# Patient Record
Sex: MALE | Race: BLACK OR AFRICAN AMERICAN | Employment: FULL TIME | URBAN - METROPOLITAN AREA
[De-identification: names, ages, dates, MRNs, and addresses within clinical notes are randomized per-mention and may not be internally consistent; named-entity substitution may affect disease eponyms.]

---

## 2017-03-19 ENCOUNTER — HOSPITAL ENCOUNTER (OUTPATIENT)
Age: 57
Discharge: HOME | End: 2017-03-19
Payer: COMMERCIAL

## 2017-03-19 DIAGNOSIS — E03.9 HYPOTHYROIDISM, UNSPECIFIED TYPE: ICD-10-CM

## 2017-03-19 DIAGNOSIS — E03.9 HYPOTHYROIDISM: ICD-10-CM

## 2017-03-19 LAB
ANION GAP SERPL CALCULATED.3IONS-SCNC: 10 MEQ/L (ref 7–13)
BUN BLDV-MCNC: 14 MG/DL (ref 6–20)
CALCIUM SERPL-MCNC: 9.6 MG/DL (ref 8.6–10.2)
CHLORIDE BLD-SCNC: 98 MEQ/L (ref 98–107)
CO2: 30 MEQ/L (ref 22–29)
CREAT SERPL-MCNC: 0.93 MG/DL (ref 0.7–1.2)
CREATININE URINE: 28.2 MG/DL
GFR AFRICAN AMERICAN: >60
GFR NON-AFRICAN AMERICAN: >60
GLUCOSE BLD-MCNC: 113 MG/DL (ref 74–109)
HBA1C MFR BLD: 6.2 % (ref 4.8–5.9)
MICROALBUMIN UR-MCNC: <1.2 MG/DL
MICROALBUMIN/CREAT UR-RTO: NORMAL MG/G (ref 0–30)
POTASSIUM SERPL-SCNC: 3.5 MEQ/L (ref 3.5–5.1)
SODIUM BLD-SCNC: 138 MEQ/L (ref 132–144)
T4 FREE: 1.22 NG/DL (ref 0.93–1.7)
TSH SERPL DL<=0.05 MIU/L-ACNC: 0.99 UIU/ML (ref 0.27–4.2)

## 2017-03-23 ENCOUNTER — OFFICE VISIT (OUTPATIENT)
Dept: SURGERY | Age: 57
End: 2017-03-23

## 2017-03-23 VITALS
SYSTOLIC BLOOD PRESSURE: 146 MMHG | DIASTOLIC BLOOD PRESSURE: 83 MMHG | BODY MASS INDEX: 35.62 KG/M2 | HEIGHT: 72 IN | WEIGHT: 263 LBS | HEART RATE: 62 BPM

## 2017-03-23 DIAGNOSIS — E03.9 HYPOTHYROIDISM, UNSPECIFIED TYPE: ICD-10-CM

## 2017-03-23 DIAGNOSIS — I10 ESSENTIAL HYPERTENSION: ICD-10-CM

## 2017-03-23 DIAGNOSIS — N62 GYNECOMASTIA: ICD-10-CM

## 2017-03-23 LAB — GLUCOSE BLD-MCNC: 160 MG/DL

## 2017-03-23 PROCEDURE — 82962 GLUCOSE BLOOD TEST: CPT | Performed by: INTERNAL MEDICINE

## 2017-03-23 PROCEDURE — 99213 OFFICE O/P EST LOW 20 MIN: CPT | Performed by: INTERNAL MEDICINE

## 2017-03-23 RX ORDER — EPLERENONE 50 MG/1
50 TABLET, FILM COATED ORAL 2 TIMES DAILY
Qty: 180 TABLET | Refills: 3 | Status: SHIPPED | OUTPATIENT
Start: 2017-03-23 | End: 2018-02-23 | Stop reason: SDUPTHER

## 2017-03-23 RX ORDER — LEVOTHYROXINE SODIUM 0.12 MG/1
125 TABLET ORAL DAILY
Qty: 90 TABLET | Refills: 3 | Status: SHIPPED | OUTPATIENT
Start: 2017-03-23 | End: 2018-02-23 | Stop reason: SDUPTHER

## 2017-03-23 RX ORDER — TAMOXIFEN CITRATE 10 MG/1
TABLET ORAL
Qty: 180 TABLET | Refills: 3 | Status: SHIPPED | OUTPATIENT
Start: 2017-03-23 | End: 2018-02-23 | Stop reason: SDUPTHER

## 2017-05-10 ENCOUNTER — EMPLOYEE WELLNESS (OUTPATIENT)
Dept: OTHER | Age: 57
End: 2017-05-10

## 2017-05-10 LAB
CHOLESTEROL, TOTAL: 127 MG/DL (ref 0–199)
GLUCOSE BLD-MCNC: 90 MG/DL (ref 74–109)
HDLC SERPL-MCNC: 28 MG/DL (ref 40–59)
LDL CHOLESTEROL CALCULATED: 70 MG/DL (ref 0–129)
TRIGL SERPL-MCNC: 145 MG/DL (ref 0–200)

## 2017-06-18 LAB — T4 FREE: 1.08 NG/DL (ref 0.93–1.7)

## 2017-06-23 ENCOUNTER — OFFICE VISIT (OUTPATIENT)
Dept: SURGERY | Age: 57
End: 2017-06-23

## 2017-06-23 VITALS
WEIGHT: 269 LBS | HEIGHT: 72 IN | SYSTOLIC BLOOD PRESSURE: 136 MMHG | BODY MASS INDEX: 36.44 KG/M2 | HEART RATE: 62 BPM | DIASTOLIC BLOOD PRESSURE: 86 MMHG

## 2017-06-23 DIAGNOSIS — E03.9 HYPOTHYROIDISM, UNSPECIFIED TYPE: ICD-10-CM

## 2017-06-23 LAB — GLUCOSE BLD-MCNC: 137 MG/DL

## 2017-06-23 PROCEDURE — 99213 OFFICE O/P EST LOW 20 MIN: CPT | Performed by: INTERNAL MEDICINE

## 2017-06-23 PROCEDURE — 82962 GLUCOSE BLOOD TEST: CPT | Performed by: INTERNAL MEDICINE

## 2017-10-23 DIAGNOSIS — E03.9 HYPOTHYROIDISM, UNSPECIFIED TYPE: ICD-10-CM

## 2017-10-23 LAB
ANION GAP SERPL CALCULATED.3IONS-SCNC: 13 MEQ/L (ref 7–13)
BUN BLDV-MCNC: 20 MG/DL (ref 6–20)
CALCIUM SERPL-MCNC: 9.4 MG/DL (ref 8.6–10.2)
CHLORIDE BLD-SCNC: 102 MEQ/L (ref 98–107)
CO2: 30 MEQ/L (ref 22–29)
CREAT SERPL-MCNC: 1.03 MG/DL (ref 0.7–1.2)
GFR AFRICAN AMERICAN: >60
GFR NON-AFRICAN AMERICAN: >60
GLUCOSE BLD-MCNC: 107 MG/DL (ref 74–109)
HBA1C MFR BLD: 6.2 % (ref 4.8–5.9)
POTASSIUM SERPL-SCNC: 3.6 MEQ/L (ref 3.5–5.1)
SODIUM BLD-SCNC: 145 MEQ/L (ref 132–144)
T4 FREE: 1.24 NG/DL (ref 0.93–1.7)
TSH SERPL DL<=0.05 MIU/L-ACNC: 1.37 UIU/ML (ref 0.27–4.2)

## 2017-10-27 ENCOUNTER — OFFICE VISIT (OUTPATIENT)
Dept: SURGERY | Age: 57
End: 2017-10-27

## 2017-10-27 VITALS
HEART RATE: 60 BPM | BODY MASS INDEX: 35.35 KG/M2 | DIASTOLIC BLOOD PRESSURE: 98 MMHG | WEIGHT: 261 LBS | HEIGHT: 72 IN | SYSTOLIC BLOOD PRESSURE: 167 MMHG

## 2017-10-27 DIAGNOSIS — E03.9 HYPOTHYROIDISM, UNSPECIFIED TYPE: ICD-10-CM

## 2017-10-27 DIAGNOSIS — E29.1 HYPOGONADISM MALE: ICD-10-CM

## 2017-10-27 DIAGNOSIS — I10 ESSENTIAL HYPERTENSION: ICD-10-CM

## 2017-10-27 LAB — GLUCOSE BLD-MCNC: 175 MG/DL

## 2017-10-27 PROCEDURE — 99213 OFFICE O/P EST LOW 20 MIN: CPT | Performed by: INTERNAL MEDICINE

## 2017-10-27 PROCEDURE — 82962 GLUCOSE BLOOD TEST: CPT | Performed by: INTERNAL MEDICINE

## 2017-10-27 RX ORDER — AMLODIPINE BESYLATE 5 MG/1
5 TABLET ORAL DAILY
Qty: 90 TABLET | Refills: 3 | Status: SHIPPED | OUTPATIENT
Start: 2017-10-27 | End: 2019-01-14 | Stop reason: SDUPTHER

## 2017-10-27 RX ORDER — AMLODIPINE BESYLATE 5 MG/1
5 TABLET ORAL DAILY
Qty: 30 TABLET | Refills: 3 | Status: SHIPPED | OUTPATIENT
Start: 2017-10-27 | End: 2017-10-27 | Stop reason: SDUPTHER

## 2018-02-06 DIAGNOSIS — E29.1 HYPOGONADISM MALE: ICD-10-CM

## 2018-02-06 DIAGNOSIS — E03.9 HYPOTHYROIDISM, UNSPECIFIED TYPE: ICD-10-CM

## 2018-02-06 LAB
ANION GAP SERPL CALCULATED.3IONS-SCNC: 15 MEQ/L (ref 7–13)
BUN BLDV-MCNC: 17 MG/DL (ref 6–20)
CALCIUM SERPL-MCNC: 9.3 MG/DL (ref 8.6–10.2)
CHLORIDE BLD-SCNC: 100 MEQ/L (ref 98–107)
CO2: 28 MEQ/L (ref 22–29)
CREAT SERPL-MCNC: 0.98 MG/DL (ref 0.7–1.2)
GFR AFRICAN AMERICAN: >60
GFR NON-AFRICAN AMERICAN: >60
GLUCOSE BLD-MCNC: 155 MG/DL (ref 74–109)
HBA1C MFR BLD: 6.8 % (ref 4.8–5.9)
POTASSIUM SERPL-SCNC: 3.7 MEQ/L (ref 3.5–5.1)
SODIUM BLD-SCNC: 143 MEQ/L (ref 132–144)
T4 FREE: 1.25 NG/DL (ref 0.93–1.7)
TSH SERPL DL<=0.05 MIU/L-ACNC: 1.51 UIU/ML (ref 0.27–4.2)

## 2018-02-08 LAB
SEX HORMONE BINDING GLOBULIN: 101 NMOL/L (ref 11–80)
TESTOSTERONE FREE PERCENT: 1 % (ref 1.6–2.9)
TESTOSTERONE FREE, CALC: 106 PG/ML (ref 47–244)
TESTOSTERONE TOTAL-MALE: 1082 NG/DL (ref 300–890)

## 2018-02-23 ENCOUNTER — OFFICE VISIT (OUTPATIENT)
Dept: ENDOCRINOLOGY | Age: 58
End: 2018-02-23
Payer: COMMERCIAL

## 2018-02-23 VITALS
SYSTOLIC BLOOD PRESSURE: 152 MMHG | HEART RATE: 64 BPM | HEIGHT: 72 IN | BODY MASS INDEX: 36.3 KG/M2 | DIASTOLIC BLOOD PRESSURE: 90 MMHG | WEIGHT: 268 LBS

## 2018-02-23 DIAGNOSIS — N62 GYNECOMASTIA: ICD-10-CM

## 2018-02-23 DIAGNOSIS — E11.8 TYPE 2 DIABETES MELLITUS WITH COMPLICATION, UNSPECIFIED LONG TERM INSULIN USE STATUS: ICD-10-CM

## 2018-02-23 DIAGNOSIS — E03.9 HYPOTHYROIDISM, UNSPECIFIED TYPE: ICD-10-CM

## 2018-02-23 LAB — GLUCOSE BLD-MCNC: 172 MG/DL

## 2018-02-23 PROCEDURE — 99213 OFFICE O/P EST LOW 20 MIN: CPT | Performed by: INTERNAL MEDICINE

## 2018-02-23 PROCEDURE — 82962 GLUCOSE BLOOD TEST: CPT | Performed by: INTERNAL MEDICINE

## 2018-02-23 RX ORDER — LANCETS 26 GAUGE
EACH MISCELLANEOUS
Qty: 100 EACH | Refills: 3 | Status: SHIPPED | OUTPATIENT
Start: 2018-02-23 | End: 2021-09-09 | Stop reason: SDUPTHER

## 2018-02-23 RX ORDER — TAMOXIFEN CITRATE 10 MG/1
TABLET ORAL
Qty: 180 TABLET | Refills: 3 | Status: SHIPPED | OUTPATIENT
Start: 2018-02-23 | End: 2019-01-14 | Stop reason: SDUPTHER

## 2018-02-23 RX ORDER — EPLERENONE 50 MG/1
50 TABLET, FILM COATED ORAL 2 TIMES DAILY
Qty: 180 TABLET | Refills: 3 | Status: SHIPPED | OUTPATIENT
Start: 2018-02-23 | End: 2019-01-14 | Stop reason: SDUPTHER

## 2018-02-23 RX ORDER — LEVOTHYROXINE SODIUM 0.12 MG/1
125 TABLET ORAL DAILY
Qty: 90 TABLET | Refills: 3 | Status: SHIPPED | OUTPATIENT
Start: 2018-02-23 | End: 2019-01-14 | Stop reason: SDUPTHER

## 2018-03-03 NOTE — PROGRESS NOTES
Creatinine Latest Ref Range: 0.70 - 1.20 mg/dL 0.98    Anion Gap Latest Ref Range: 7 - 13 mEq/L 15 (H)    GFR Non- Latest Ref Range: >60  >60.0    GFR African American Latest Ref Range: >60  >60.0    Glucose Latest Ref Range: 74 - 109 mg/dL 155 (H)    Calcium Latest Ref Range: 8.6 - 10.2 mg/dL 9.3    Sex Hormone Binding Latest Ref Range: 11 - 80 nmol/L  101 (H)   Testosterone Free, Calc Latest Ref Range: 47 - 244 pg/mL  106   Total Testosterone Latest Ref Range: 300 - 890 ng/dL  1082 (H)   Testosterone % Free Latest Ref Range: 1.6 - 2.9 %  1.0 (L)   TSH Latest Ref Range: 0.270 - 4.200 uIU/mL 1.510    T4 Free Latest Ref Range: 0.93 - 1.70 ng/dL 1.25        Assessment:       1. Uncontrolled diabetes mellitus type 2 without complications, unspecified long term insulin use status (HCC)  POCT Glucose    SITagliptin (JANUVIA) 100 MG tablet    Basic Metabolic Panel    Testosterone Free And Total Male    T4, Free    TSH without Reflex    Hemoglobin A1C   2. Gynecomastia  eplerenone (INSPRA) 50 MG tablet    SAFETY LANCETS MISC    glucose blood VI test strips (FREESTYLE INSULINX TEST) strip   3. Type 2 diabetes mellitus with complication, unspecified long term insulin use status (HCC)  SAFETY LANCETS MISC    glucose blood VI test strips (FREESTYLE INSULINX TEST) strip   4.  Hypothyroidism  SAFETY LANCETS MISC    glucose blood VI test strips (FREESTYLE INSULINX TEST) strip         Plan:      Orders Placed This Encounter   Procedures    Basic Metabolic Panel     Standing Status:   Future     Standing Expiration Date:   2/23/2019    Testosterone Free And Total Male     Standing Status:   Future     Standing Expiration Date:   2/23/2019    T4, Free     Standing Status:   Future     Standing Expiration Date:   2/23/2019    TSH without Reflex     Standing Status:   Future     Standing Expiration Date:   2/23/2019    Hemoglobin A1C     Standing Status:   Future     Standing Expiration Date:   2/23/2019   Naeem Lara POCT Glucose       Orders Placed This Encounter   Medications    metFORMIN (GLUCOPHAGE) 500 MG tablet     Sig: TAKE 1 TABLET TWICE A DAY WITH MEALS     Dispense:  180 tablet     Refill:  3    tamoxifen (NOLVADEX) 10 MG tablet     Sig: Take 1 tablet by mouth  twice a day     Dispense:  180 tablet     Refill:  3    levothyroxine (SYNTHROID) 125 MCG tablet     Sig: Take 1 tablet by mouth daily     Dispense:  90 tablet     Refill:  03    eplerenone (INSPRA) 50 MG tablet     Sig: Take 1 tablet by mouth 2 times daily     Dispense:  180 tablet     Refill:  03    SITagliptin (JANUVIA) 100 MG tablet     Sig: Take 1 tablet by mouth daily     Dispense:  90 tablet     Refill:  3    SAFETY LANCETS MISC     Sig: Safety pro lancets, use one daily     Dispense:  100 each     Refill:  3    glucose blood VI test strips (FREESTYLE INSULINX TEST) strip     Sig: Test once daily     Dispense:  100 strip     Refill:  3

## 2018-03-05 ENCOUNTER — TELEPHONE (OUTPATIENT)
Dept: INTERNAL MEDICINE CLINIC | Age: 58
End: 2018-03-05

## 2018-03-05 RX ORDER — BLOOD-GLUCOSE METER
EACH MISCELLANEOUS
Qty: 1 KIT | Refills: 0 | Status: SHIPPED | OUTPATIENT
Start: 2018-03-05 | End: 2018-03-14 | Stop reason: SDUPTHER

## 2018-03-14 RX ORDER — BLOOD-GLUCOSE METER
EACH MISCELLANEOUS
Qty: 1 KIT | Refills: 0 | Status: SHIPPED | OUTPATIENT
Start: 2018-03-14 | End: 2022-04-21 | Stop reason: SDUPTHER

## 2018-03-20 VITALS — BODY MASS INDEX: 35.53 KG/M2 | WEIGHT: 262 LBS

## 2018-04-12 LAB
CHOLESTEROL, TOTAL: 120 MG/DL (ref 0–199)
HDLC SERPL-MCNC: 31 MG/DL (ref 40–59)
LDL CHOLESTEROL CALCULATED: 60 MG/DL (ref 0–129)
TRIGL SERPL-MCNC: 145 MG/DL (ref 0–200)

## 2018-04-19 ENCOUNTER — OFFICE VISIT (OUTPATIENT)
Dept: FAMILY MEDICINE CLINIC | Age: 58
End: 2018-04-19
Payer: COMMERCIAL

## 2018-04-19 VITALS
TEMPERATURE: 97.9 F | DIASTOLIC BLOOD PRESSURE: 80 MMHG | HEART RATE: 58 BPM | SYSTOLIC BLOOD PRESSURE: 124 MMHG | BODY MASS INDEX: 36.24 KG/M2 | RESPIRATION RATE: 18 BRPM | WEIGHT: 267.6 LBS | HEIGHT: 72 IN | OXYGEN SATURATION: 99 %

## 2018-04-19 DIAGNOSIS — M76.62 TENDONITIS, ACHILLES, LEFT: Primary | ICD-10-CM

## 2018-04-19 PROCEDURE — 99213 OFFICE O/P EST LOW 20 MIN: CPT | Performed by: NURSE PRACTITIONER

## 2018-04-19 RX ORDER — METHYLPREDNISOLONE 4 MG/1
TABLET ORAL
Qty: 1 KIT | Refills: 0 | Status: SHIPPED | OUTPATIENT
Start: 2018-04-19 | End: 2018-05-25 | Stop reason: ALTCHOICE

## 2018-04-26 ENCOUNTER — EMPLOYEE WELLNESS (OUTPATIENT)
Dept: INTERNAL MEDICINE CLINIC | Age: 58
End: 2018-04-26

## 2018-04-26 LAB
CHOLESTEROL, TOTAL: 124 MG/DL (ref 0–199)
GLUCOSE BLD-MCNC: 103 MG/DL (ref 74–109)
HDLC SERPL-MCNC: 33 MG/DL (ref 40–59)
LDL CHOLESTEROL CALCULATED: 60 MG/DL (ref 0–129)
TRIGL SERPL-MCNC: 154 MG/DL (ref 0–200)

## 2018-05-02 VITALS — WEIGHT: 257 LBS | BODY MASS INDEX: 34.86 KG/M2

## 2018-05-08 LAB
ANION GAP SERPL CALCULATED.3IONS-SCNC: 16 MEQ/L (ref 7–13)
BUN BLDV-MCNC: 14 MG/DL (ref 6–20)
CALCIUM SERPL-MCNC: 9.3 MG/DL (ref 8.6–10.2)
CHLORIDE BLD-SCNC: 99 MEQ/L (ref 98–107)
CO2: 30 MEQ/L (ref 22–29)
CREAT SERPL-MCNC: 1 MG/DL (ref 0.7–1.2)
GFR AFRICAN AMERICAN: >60
GFR NON-AFRICAN AMERICAN: >60
GLUCOSE BLD-MCNC: 172 MG/DL (ref 74–109)
HBA1C MFR BLD: 6.8 % (ref 4.8–5.9)
POTASSIUM SERPL-SCNC: 3.2 MEQ/L (ref 3.5–5.1)
SODIUM BLD-SCNC: 145 MEQ/L (ref 132–144)
T4 FREE: 1.38 NG/DL (ref 0.93–1.7)
TSH SERPL DL<=0.05 MIU/L-ACNC: 1.27 UIU/ML (ref 0.27–4.2)

## 2018-05-10 LAB
SEX HORMONE BINDING GLOBULIN: 106 NMOL/L (ref 11–80)
TESTOSTERONE FREE PERCENT: 0.9 % (ref 1.6–2.9)
TESTOSTERONE FREE, CALC: 93 PG/ML (ref 47–244)
TESTOSTERONE TOTAL-MALE: 1007 NG/DL (ref 300–890)

## 2018-05-19 ENCOUNTER — OFFICE VISIT (OUTPATIENT)
Dept: FAMILY MEDICINE CLINIC | Age: 58
End: 2018-05-19
Payer: COMMERCIAL

## 2018-05-19 ENCOUNTER — HOSPITAL ENCOUNTER (OUTPATIENT)
Dept: ULTRASOUND IMAGING | Age: 58
Discharge: HOME | End: 2018-05-21
Payer: COMMERCIAL

## 2018-05-19 VITALS
HEIGHT: 72 IN | OXYGEN SATURATION: 98 % | TEMPERATURE: 97 F | WEIGHT: 262.9 LBS | HEART RATE: 60 BPM | BODY MASS INDEX: 35.61 KG/M2 | SYSTOLIC BLOOD PRESSURE: 128 MMHG | DIASTOLIC BLOOD PRESSURE: 74 MMHG

## 2018-05-19 DIAGNOSIS — M79.89 RIGHT LEG SWELLING: ICD-10-CM

## 2018-05-19 DIAGNOSIS — I82.90 THROMBOSIS OF SUPERFICIAL VESSEL: ICD-10-CM

## 2018-05-19 DIAGNOSIS — M79.89 RIGHT LEG SWELLING: Primary | ICD-10-CM

## 2018-05-19 PROCEDURE — 99214 OFFICE O/P EST MOD 30 MIN: CPT | Performed by: NURSE PRACTITIONER

## 2018-05-19 PROCEDURE — 93971 EXTREMITY STUDY: CPT

## 2018-05-19 ASSESSMENT — ENCOUNTER SYMPTOMS
COUGH: 0
APNEA: 0
BACK PAIN: 0
TROUBLE SWALLOWING: 0
FACIAL SWELLING: 0
SHORTNESS OF BREATH: 0
STRIDOR: 0
SINUS PRESSURE: 0
CHOKING: 0
GASTROINTESTINAL NEGATIVE: 1
EYE DISCHARGE: 0
WHEEZING: 0
SORE THROAT: 0
EYE ITCHING: 0
CHEST TIGHTNESS: 0
COLOR CHANGE: 0

## 2018-05-25 ENCOUNTER — OFFICE VISIT (OUTPATIENT)
Dept: ENDOCRINOLOGY | Age: 58
End: 2018-05-25
Payer: COMMERCIAL

## 2018-05-25 VITALS
HEIGHT: 72 IN | DIASTOLIC BLOOD PRESSURE: 88 MMHG | BODY MASS INDEX: 35.49 KG/M2 | SYSTOLIC BLOOD PRESSURE: 138 MMHG | WEIGHT: 262 LBS | HEART RATE: 72 BPM

## 2018-05-25 DIAGNOSIS — E03.9 HYPOTHYROIDISM, UNSPECIFIED TYPE: ICD-10-CM

## 2018-05-25 DIAGNOSIS — I10 ESSENTIAL HYPERTENSION: ICD-10-CM

## 2018-05-25 DIAGNOSIS — B35.1 ONYCHOMYCOSIS: ICD-10-CM

## 2018-05-25 DIAGNOSIS — E87.6 HYPOKALEMIA: ICD-10-CM

## 2018-05-25 LAB — GLUCOSE BLD-MCNC: 161 MG/DL

## 2018-05-25 PROCEDURE — 82962 GLUCOSE BLOOD TEST: CPT | Performed by: INTERNAL MEDICINE

## 2018-05-25 PROCEDURE — 99214 OFFICE O/P EST MOD 30 MIN: CPT | Performed by: INTERNAL MEDICINE

## 2018-06-04 ENCOUNTER — OFFICE VISIT (OUTPATIENT)
Dept: FAMILY MEDICINE CLINIC | Age: 58
End: 2018-06-04
Payer: COMMERCIAL

## 2018-06-04 VITALS
HEIGHT: 72 IN | DIASTOLIC BLOOD PRESSURE: 78 MMHG | HEART RATE: 68 BPM | WEIGHT: 255 LBS | RESPIRATION RATE: 16 BRPM | TEMPERATURE: 98 F | OXYGEN SATURATION: 98 % | SYSTOLIC BLOOD PRESSURE: 132 MMHG | BODY MASS INDEX: 34.54 KG/M2

## 2018-06-04 DIAGNOSIS — I82.811 EMBOLISM AND THROMBOSIS OF SUPERFICIAL VEIN OF RIGHT LOWER EXTREMITY: ICD-10-CM

## 2018-06-04 DIAGNOSIS — E03.9 HYPOTHYROIDISM, UNSPECIFIED TYPE: ICD-10-CM

## 2018-06-04 DIAGNOSIS — E11.9 TYPE 2 DIABETES MELLITUS WITHOUT COMPLICATION, WITHOUT LONG-TERM CURRENT USE OF INSULIN (HCC): Primary | ICD-10-CM

## 2018-06-04 DIAGNOSIS — I10 ESSENTIAL HYPERTENSION: ICD-10-CM

## 2018-06-04 PROCEDURE — 99214 OFFICE O/P EST MOD 30 MIN: CPT | Performed by: INTERNAL MEDICINE

## 2018-06-04 ASSESSMENT — PATIENT HEALTH QUESTIONNAIRE - PHQ9
SUM OF ALL RESPONSES TO PHQ QUESTIONS 1-9: 0
SUM OF ALL RESPONSES TO PHQ9 QUESTIONS 1 & 2: 0
1. LITTLE INTEREST OR PLEASURE IN DOING THINGS: 0
2. FEELING DOWN, DEPRESSED OR HOPELESS: 0

## 2018-06-04 ASSESSMENT — ENCOUNTER SYMPTOMS
ABDOMINAL PAIN: 0
SHORTNESS OF BREATH: 0
EYE PAIN: 0
BACK PAIN: 0

## 2018-06-15 ENCOUNTER — TELEPHONE (OUTPATIENT)
Dept: FAMILY MEDICINE CLINIC | Age: 58
End: 2018-06-15

## 2018-07-02 ENCOUNTER — OFFICE VISIT (OUTPATIENT)
Dept: FAMILY MEDICINE CLINIC | Age: 58
End: 2018-07-02
Payer: COMMERCIAL

## 2018-07-02 VITALS
BODY MASS INDEX: 35.49 KG/M2 | HEIGHT: 72 IN | OXYGEN SATURATION: 97 % | SYSTOLIC BLOOD PRESSURE: 122 MMHG | TEMPERATURE: 98.4 F | HEART RATE: 70 BPM | WEIGHT: 262 LBS | RESPIRATION RATE: 16 BRPM | DIASTOLIC BLOOD PRESSURE: 62 MMHG

## 2018-07-02 DIAGNOSIS — I10 ESSENTIAL HYPERTENSION: ICD-10-CM

## 2018-07-02 DIAGNOSIS — E87.6 HYPOKALEMIA: ICD-10-CM

## 2018-07-02 DIAGNOSIS — I80.01 THROMBOPHLEBITIS OF SUPERFICIAL VEINS OF RIGHT LOWER EXTREMITY: ICD-10-CM

## 2018-07-02 DIAGNOSIS — E11.9 CONTROLLED TYPE 2 DIABETES MELLITUS WITHOUT COMPLICATION, WITHOUT LONG-TERM CURRENT USE OF INSULIN (HCC): Primary | ICD-10-CM

## 2018-07-02 DIAGNOSIS — M76.62 ACHILLES TENDINITIS OF LEFT LOWER EXTREMITY: ICD-10-CM

## 2018-07-02 DIAGNOSIS — E11.9 CONTROLLED TYPE 2 DIABETES MELLITUS WITHOUT COMPLICATION, WITHOUT LONG-TERM CURRENT USE OF INSULIN (HCC): ICD-10-CM

## 2018-07-02 LAB
CREATININE URINE: 151.8 MG/DL
MICROALBUMIN UR-MCNC: <1.2 MG/DL
MICROALBUMIN/CREAT UR-RTO: NORMAL MG/G (ref 0–30)
POTASSIUM SERPL-SCNC: 3.6 MEQ/L (ref 3.5–5.1)

## 2018-07-02 PROCEDURE — 93000 ELECTROCARDIOGRAM COMPLETE: CPT | Performed by: INTERNAL MEDICINE

## 2018-07-02 PROCEDURE — 99214 OFFICE O/P EST MOD 30 MIN: CPT | Performed by: INTERNAL MEDICINE

## 2018-07-02 RX ORDER — EFINACONAZOLE 100 MG/ML
SOLUTION TOPICAL
Refills: 5 | COMMUNITY
Start: 2018-06-06 | End: 2019-04-08 | Stop reason: SDUPTHER

## 2018-07-02 ASSESSMENT — ENCOUNTER SYMPTOMS
SHORTNESS OF BREATH: 0
ABDOMINAL PAIN: 0
EYE PAIN: 0
BACK PAIN: 0

## 2018-07-02 NOTE — PATIENT INSTRUCTIONS
hours. They can cause low blood sugar if you don't eat as you planned. They include glipizide and glyburide. · Thiazolidinediones. These reduce the amount of blood glucose. They also help you respond better to insulin. They include pioglitazone and rosiglitazone. You may need to take more than one medicine for diabetes. Two or more medicines may work better to lower your blood sugar level than just one does. Follow-up care is a key part of your treatment and safety. Be sure to make and go to all appointments, and call your doctor if you are having problems. It's also a good idea to know your test results and keep a list of the medicines you take. How can you care for yourself at home? · Eat a healthy diet. Get some exercise each day. This may help you to reduce how much medicine you need. · Do not take other prescription or over-the-counter medicines, vitamins, herbal products, or supplements without talking to your doctor first. Some medicines for type 2 diabetes can cause problems with other medicines or supplements. · Tell your doctor if you plan to get pregnant. Some of these drugs are not safe for pregnant women. · Be safe with medicines. Take your medicines exactly as prescribed. Meglitinides and sulfonylureas can cause your blood sugar to drop very low. Call your doctor if you think you are having a problem with your medicine. · Check your blood sugar often. You can use a glucose monitor. Keeping track can help you know how certain foods, activities, and medicines affect your blood sugar. And it can help you keep your blood sugar from getting so low that it's not safe. When should you call for help? Call 911 anytime you think you may need emergency care.  For example, call if:    · You passed out (lost consciousness).     · You are confused or cannot think clearly.     · Your blood sugar is very high or very low.    Watch closely for changes in your health, and be sure to contact your doctor if:

## 2018-07-02 NOTE — PROGRESS NOTES
Subjective:      Patient ID: Adair Garcia is a 62 y.o. male who presents today with:  Chief Complaint   Patient presents with    Diabetes     follow up/check up    Tendonitis     left achilles tendon very painful       HPI    Diabetes-Chronic, a1c controlled. Improved with Janumet. Superificial venous thrombosis-Subacute-Right leg, In may 19th 2018. Achilles pain-Months, he has completed a steroid regimen. It helped for a few weeks. Some help with OTC nsaids. Only hurts when he weight bears. Left side. Past Medical History:   Diagnosis Date    Gynecomastia     History of hypokalemia     Hyperaldosteronism, unspecified     Hypertension     Hypogonadism male     Hypokalemia     Hypothyroidism     Osteoarthritis     Type II or unspecified type diabetes mellitus without mention of complication, not stated as uncontrolled      Past Surgical History:   Procedure Laterality Date    JOINT REPLACEMENT      left quadracep tendon rupture repair w/ anchors     Social History     Social History    Marital status: Single     Spouse name: N/A    Number of children: N/A    Years of education: N/A     Occupational History    resp. therapist      Social History Main Topics    Smoking status: Never Smoker    Smokeless tobacco: Never Used    Alcohol use No    Drug use: No    Sexual activity: Not on file      Comment: single     Other Topics Concern    Not on file     Social History Narrative    No narrative on file     Allergies   Allergen Reactions    Morphine     Invokana [Canagliflozin]      UTI and yeast infection     Shellfish-Derived Products Swelling     Current Outpatient Prescriptions on File Prior to Visit   Medication Sig Dispense Refill    rivaroxaban (XARELTO) 10 MG TABS tablet Take 1 tablet by mouth daily (with breakfast) 14 tablet 0    ciclopirox (PENLAC) 8 % solution Apply topically nightly.  1 Bottle 5    sitaGLIPtan-metformin (JANUMET)  MG per tablet Take 1 tablet by mouth 2 times daily (with meals) 180 tablet 1    Blood Glucose Monitoring Suppl (AGAMATRIX LUCINDAO) w/Device KIT Please give 1 meter dx e11.65 1 kit 0    tamoxifen (NOLVADEX) 10 MG tablet Take 1 tablet by mouth  twice a day 180 tablet 3    levothyroxine (SYNTHROID) 125 MCG tablet Take 1 tablet by mouth daily 90 tablet 03    eplerenone (INSPRA) 50 MG tablet Take 1 tablet by mouth 2 times daily 180 tablet 03    SAFETY LANCETS MISC Safety pro lancets, use one daily 100 each 3    glucose blood VI test strips (FREESTYLE INSULINX TEST) strip Test once daily 100 strip 3    amLODIPine (NORVASC) 5 MG tablet Take 1 tablet by mouth daily 90 tablet 3    telmisartan (MICARDIS) 80 MG tablet Take 80 mg by mouth daily      atenolol (TENORMIN) 50 MG tablet Take 50 mg by mouth daily.  rivaroxaban (XARELTO) 15 MG TABS tablet Take 1 tablet by mouth 2 times daily (with meals) for 21 days 42 tablet 0     No current facility-administered medications on file prior to visit. I have personally reviewed the ROS, PMH, PFH, and social history     Review of Systems   Constitutional: Negative for chills. HENT: Negative for congestion. Eyes: Negative for pain. Respiratory: Negative for shortness of breath. Cardiovascular: Negative for chest pain. Gastrointestinal: Negative for abdominal pain. Genitourinary: Negative for hematuria. Musculoskeletal: Negative for back pain. +Left heel pain    Allergic/Immunologic: Negative for immunocompromised state. Neurological: Negative for headaches. Psychiatric/Behavioral: Negative for hallucinations. Objective:   /62 (Site: Left Arm, Position: Sitting, Cuff Size: Large Adult)   Pulse 70   Temp 98.4 °F (36.9 °C) (Tympanic)   Resp 16   Ht 6' (1.829 m)   Wt 262 lb (118.8 kg)   SpO2 97%   BMI 35.53 kg/m²     Physical Exam   Constitutional: He is oriented to person, place, and time. He appears well-developed and well-nourished.    HENT:   Head: Expiration Date:   7/2/2019    Potassium     Standing Status:   Future     Standing Expiration Date:   7/2/2019    EKG 12 lead     Order Specific Question:   Reason for Exam?     Answer:   Hypertension     Orders Placed This Encounter   Medications    diclofenac 2 % SOLN     Sig: Apply one application twice daily to left heel pain. Dispense:  1 Bottle     Refill:  2       Return in about 3 months (around 10/2/2018) for regularly scheduled appointment with PCP, worsening symptoms, call ASAP for appointment. Lucille Canela MD      I explained that NSAID-type medications have three important potential side effects: gastrointestinal irritation including hemorrhage, myocardial injury including possible infarction, and kidney injury. She was asked to take the medication with food, avoid any other NSAIDs or steroids, and discontinue for any untowards effects. She should immediately stop the medication and proceed to the emergency department for chest pain, dark urine or difficulty with producing urine, vomiting, abdominal pain or black/bloody stools.

## 2018-08-06 ENCOUNTER — HOSPITAL ENCOUNTER (OUTPATIENT)
Dept: ULTRASOUND IMAGING | Age: 58
Discharge: HOME | End: 2018-08-08
Payer: COMMERCIAL

## 2018-08-06 DIAGNOSIS — I80.01 THROMBOPHLEBITIS OF SUPERFICIAL VEINS OF RIGHT LOWER EXTREMITY: ICD-10-CM

## 2018-08-06 PROCEDURE — 93971 EXTREMITY STUDY: CPT

## 2018-08-08 ENCOUNTER — TELEPHONE (OUTPATIENT)
Dept: FAMILY MEDICINE CLINIC | Age: 58
End: 2018-08-08

## 2018-08-08 DIAGNOSIS — I82.890 SUPERFICIAL VEIN THROMBOSIS: Primary | ICD-10-CM

## 2018-08-08 NOTE — TELEPHONE ENCOUNTER
Called patient, discussed safer to repeat Lower extremity US in 30 days. He agrees to proceed. Understands risk of clot progression.

## 2018-08-27 DIAGNOSIS — E03.9 HYPOTHYROIDISM, UNSPECIFIED TYPE: ICD-10-CM

## 2018-08-27 LAB
ANION GAP SERPL CALCULATED.3IONS-SCNC: 14 MEQ/L (ref 7–13)
BUN BLDV-MCNC: 13 MG/DL (ref 6–20)
CALCIUM SERPL-MCNC: 8.9 MG/DL (ref 8.6–10.2)
CHLORIDE BLD-SCNC: 100 MEQ/L (ref 98–107)
CO2: 28 MEQ/L (ref 22–29)
CREAT SERPL-MCNC: 1.05 MG/DL (ref 0.7–1.2)
GFR AFRICAN AMERICAN: >60
GFR NON-AFRICAN AMERICAN: >60
GLUCOSE BLD-MCNC: 92 MG/DL (ref 74–109)
HBA1C MFR BLD: 6.2 % (ref 4.8–5.9)
POTASSIUM SERPL-SCNC: 3.2 MEQ/L (ref 3.5–5.1)
SODIUM BLD-SCNC: 142 MEQ/L (ref 132–144)
T4 FREE: 1.47 NG/DL (ref 0.93–1.7)
TSH SERPL DL<=0.05 MIU/L-ACNC: 0.9 UIU/ML (ref 0.27–4.2)

## 2018-08-30 ENCOUNTER — OFFICE VISIT (OUTPATIENT)
Dept: ENDOCRINOLOGY | Age: 58
End: 2018-08-30
Payer: COMMERCIAL

## 2018-08-30 VITALS
HEART RATE: 56 BPM | BODY MASS INDEX: 34.13 KG/M2 | SYSTOLIC BLOOD PRESSURE: 139 MMHG | DIASTOLIC BLOOD PRESSURE: 84 MMHG | WEIGHT: 252 LBS | HEIGHT: 72 IN

## 2018-08-30 DIAGNOSIS — E03.9 HYPOTHYROIDISM, UNSPECIFIED TYPE: ICD-10-CM

## 2018-08-30 DIAGNOSIS — E66.9 OBESITY (BMI 30-39.9): ICD-10-CM

## 2018-08-30 LAB — GLUCOSE BLD-MCNC: 191 MG/DL

## 2018-08-30 PROCEDURE — 99213 OFFICE O/P EST LOW 20 MIN: CPT | Performed by: INTERNAL MEDICINE

## 2018-08-30 PROCEDURE — 82962 GLUCOSE BLOOD TEST: CPT | Performed by: INTERNAL MEDICINE

## 2018-08-30 NOTE — PROGRESS NOTES
Subjective:      Patient ID: Baudilio Ambrose is a 62 y.o. male. 3 months follow-up    Diabetes   He presents for his follow-up diabetic visit. He has type 2 diabetes mellitus. His disease course has been improving. Risk factors for coronary artery disease include diabetes mellitus, obesity, male sex and hypertension. Current diabetic treatment includes oral agent (dual therapy) (janumet ). His overall blood glucose range is 110-130 mg/dl. (Lab Results       Component                Value               Date                       LABA1C                   6.2 (H)             08/27/2018            ) An ACE inhibitor/angiotensin II receptor blocker is being taken. Hypothyroidism replacement with Synthroid 125 µg daily thyroid function tests have been stable    Reviewed labs with patient    Results for Sergey Carrasco (MRN [id number]) as of 8/30/2018 11:06   Ref. Range 8/27/2018 07:16   Sodium Latest Ref Range: 132 - 144 mEq/L 142   Potassium Latest Ref Range: 3.5 - 5.1 mEq/L 3.2 (L)   Chloride Latest Ref Range: 98 - 107 mEq/L 100   CO2 Latest Ref Range: 22 - 29 mEq/L 28   BUN Latest Ref Range: 6 - 20 mg/dL 13   Creatinine Latest Ref Range: 0.70 - 1.20 mg/dL 1.05   Anion Gap Latest Ref Range: 7 - 13 mEq/L 14 (H)   GFR Non- Latest Ref Range: >60  >60.0   GFR African American Latest Ref Range: >60  >60.0   Glucose Latest Ref Range: 74 - 109 mg/dL 92   Calcium Latest Ref Range: 8.6 - 10.2 mg/dL 8.9   Hemoglobin A1C Latest Ref Range: 4.8 - 5.9 % 6.2 (H)   TSH Latest Ref Range: 0.270 - 4.200 uIU/mL 0.900   T4 Free Latest Ref Range: 0.93 - 1.70 ng/dL 1.47     Obesity has lost 10 lbs over 3 months   Body mass index is 34.18 kg/m².       Patient Active Problem List   Diagnosis    Hypokalemia    Gynecomastia    Hypogonadism male    History of hypokalemia    Hyperaldosteronism ([address]    Hypertension    Type II diabetes mellitus, uncontrolled ([address]    Hypothyroidism    Obesity     Allergies   Allergen appears well-developed and well-nourished. HENT:   Head: Normocephalic and atraumatic. Eyes: Conjunctivae are normal.   Neck: Neck supple. Cardiovascular: Normal rate and normal heart sounds. Pulmonary/Chest: Effort normal and breath sounds normal.   Musculoskeletal: Normal range of motion. Neurological: He is alert. Skin: Skin is warm. Psychiatric: He has a normal mood and affect. Assessment:       Diagnosis Orders   1. Uncontrolled diabetes mellitus type 2 without complications, unspecified whether long term insulin use (HCC)  POCT Glucose    Basic Metabolic Panel    Hemoglobin A1C   2. Hypothyroidism, unspecified type  T4, Free    TSH without Reflex   3. Obesity (BMI 30-39. 9)             Plan:      Orders Placed This Encounter   Procedures    Basic Metabolic Panel     Standing Status:   Future     Standing Expiration Date:   8/30/2019    Hemoglobin A1C     Standing Status:   Future     Standing Expiration Date:   8/30/2019    T4, Free     Standing Status:   Future     Standing Expiration Date:   8/30/2019    TSH without Reflex     Standing Status:   Future     Standing Expiration Date:   8/30/2019    POCT Glucose     Continue janumet 50/500  twice a day and Synthroid 125 µg daily    F/u in 6 months         Triston Youssef MD

## 2018-10-01 ENCOUNTER — OFFICE VISIT (OUTPATIENT)
Dept: FAMILY MEDICINE CLINIC | Age: 58
End: 2018-10-01
Payer: COMMERCIAL

## 2018-10-01 VITALS
DIASTOLIC BLOOD PRESSURE: 88 MMHG | WEIGHT: 249.4 LBS | RESPIRATION RATE: 15 BRPM | SYSTOLIC BLOOD PRESSURE: 128 MMHG | BODY MASS INDEX: 33.05 KG/M2 | HEIGHT: 73 IN | OXYGEN SATURATION: 95 % | HEART RATE: 55 BPM | TEMPERATURE: 94.8 F

## 2018-10-01 DIAGNOSIS — E87.6 HYPOKALEMIA: ICD-10-CM

## 2018-10-01 DIAGNOSIS — I82.811 CHRONIC SUPERFICIAL VENOUS THROMBOSIS OF LOWER EXTREMITY, RIGHT: ICD-10-CM

## 2018-10-01 DIAGNOSIS — E11.9 TYPE 2 DIABETES MELLITUS WITHOUT COMPLICATION, WITHOUT LONG-TERM CURRENT USE OF INSULIN (HCC): Primary | ICD-10-CM

## 2018-10-01 DIAGNOSIS — M72.2 PLANTAR FASCIITIS OF LEFT FOOT: ICD-10-CM

## 2018-10-01 PROCEDURE — 99214 OFFICE O/P EST MOD 30 MIN: CPT | Performed by: INTERNAL MEDICINE

## 2018-10-01 ASSESSMENT — ENCOUNTER SYMPTOMS
BACK PAIN: 0
SHORTNESS OF BREATH: 0
ABDOMINAL PAIN: 0
EYE PAIN: 0

## 2018-10-01 NOTE — PATIENT INSTRUCTIONS
cold water, can also help reduce swelling. But because heat alone may make pain and swelling worse, end a contrast bath with a soak in cold water. · Wear a night splint if your doctor suggests it. A night splint holds your foot with the toes pointed up and the foot and ankle at a 90-degree angle. This position gives the bottom of your foot a constant, gentle stretch. · Do simple exercises such as calf stretches and towel stretches 2 to 3 times each day, especially when you first get up in the morning. These can help the plantar fascia become more flexible. They also make the muscles that support your arch stronger. Hold these stretches for 15 to 30 seconds per stretch. Repeat 2 to 4 times. ¨ Stand about 1 foot from a wall. Place the palms of both hands against the wall at chest level. Lean forward against the wall, keeping one leg with the knee straight and heel on the ground while bending the knee of the other leg. ¨ Sit down on the floor or a mat with your feet stretched in front of you. Roll up a towel lengthwise, and loop it over the ball of your foot. Holding the towel at both ends, gently pull the towel toward you to stretch your foot. · Wear shoes with good arch support. Athletic shoes or shoes with a well-cushioned sole are good choices. · Try heel cups or shoe inserts (orthotics) to help cushion your heel. You can buy these at many shoe stores. · Put on your shoes as soon as you get out of bed. Going barefoot or wearing slippers may make your pain worse. · Reach and stay at a good weight for your height. This puts less strain on your feet. When should you call for help?   Call your doctor now or seek immediate medical care if:    · You have heel pain with fever, redness, or warmth in your heel.     · You cannot put weight on the sore foot.    Watch closely for changes in your health, and be sure to contact your doctor if:    · You have numbness or tingling in your heel.     · Your heel pain lasts

## 2018-10-01 NOTE — PROGRESS NOTES
[place]...
(Tympanic)   Resp 15   Ht 6' 1\" (1.854 m)   Wt 249 lb 6.4 oz (113.1 kg)   SpO2 95%   BMI 32.90 kg/m²     Physical Exam   Constitutional: He is oriented to person, place, and time. He appears well-developed and well-nourished. HENT:   Head: Normocephalic. Eyes: Pupils are equal, round, and reactive to light. Neck: No tracheal deviation present. Cardiovascular: Normal rate, regular rhythm and normal heart sounds. Exam reveals no gallop and no friction rub. No murmur heard. Pulmonary/Chest: No respiratory distress. Abdominal: Soft. Bowel sounds are normal. He exhibits no distension. There is no rebound. Musculoskeletal: He exhibits no edema. Feet without ulcers  2+ dp bl    Neurological: He is oriented to person, place, and time. Skin: Skin is warm and dry. No rash noted. No erythema (over right foot ). Assessment:       Diagnosis Orders   1. Type 2 diabetes mellitus without complication, without long-term current use of insulin ([facility])  sitaGLIPtan-metformin (JANUMET)  MG per tablet   2. Plantar fasciitis of left foot     3. Chronic superficial venous thrombosis of lower extremity, right     4. Hypokalemia  Potassium         Plan:   Continue same orders for diabetes  Stretches, conservative care, call if anything worsens and send to podiatry for orthotics and pt   He refused US due to cost, understands could be fatal.   Repeat potassium. Replacement if indicated. Orders Placed This Encounter   Procedures    Potassium     Standing Status:   Future     Standing Expiration Date:   10/1/2019     Orders Placed This Encounter   Medications    sitaGLIPtan-metformin (JANUMET)  MG per tablet     Sig: Take 1 tablet by mouth 2 times daily (with meals)     Dispense:  180 tablet     Refill:  3       Return in about 3 months (around 1/1/2019) for regularly scheduled appointment with PCP, worsening symptoms, call ASAP for appointment.       Nichole Lopez MD

## 2019-01-11 DIAGNOSIS — E87.6 HYPOKALEMIA: ICD-10-CM

## 2019-01-11 LAB — POTASSIUM SERPL-SCNC: 3.7 MEQ/L (ref 3.5–5.1)

## 2019-01-14 ENCOUNTER — TELEPHONE (OUTPATIENT)
Dept: FAMILY MEDICINE CLINIC | Age: 59
End: 2019-01-14

## 2019-01-14 ENCOUNTER — OFFICE VISIT (OUTPATIENT)
Dept: FAMILY MEDICINE CLINIC | Age: 59
End: 2019-01-14
Payer: COMMERCIAL

## 2019-01-14 VITALS
DIASTOLIC BLOOD PRESSURE: 84 MMHG | BODY MASS INDEX: 34.5 KG/M2 | RESPIRATION RATE: 15 BRPM | HEART RATE: 60 BPM | HEIGHT: 72 IN | WEIGHT: 254.7 LBS | OXYGEN SATURATION: 98 % | TEMPERATURE: 97.9 F | SYSTOLIC BLOOD PRESSURE: 128 MMHG

## 2019-01-14 DIAGNOSIS — N62 GYNECOMASTIA: ICD-10-CM

## 2019-01-14 DIAGNOSIS — E11.9 TYPE 2 DIABETES MELLITUS WITHOUT COMPLICATION, WITHOUT LONG-TERM CURRENT USE OF INSULIN (HCC): ICD-10-CM

## 2019-01-14 DIAGNOSIS — E03.9 HYPOTHYROIDISM, UNSPECIFIED TYPE: ICD-10-CM

## 2019-01-14 DIAGNOSIS — I10 ESSENTIAL HYPERTENSION: ICD-10-CM

## 2019-01-14 DIAGNOSIS — E11.9 TYPE 2 DIABETES MELLITUS WITHOUT COMPLICATION, WITHOUT LONG-TERM CURRENT USE OF INSULIN (HCC): Primary | ICD-10-CM

## 2019-01-14 DIAGNOSIS — Z23 ENCOUNTER FOR IMMUNIZATION: ICD-10-CM

## 2019-01-14 LAB
ALBUMIN SERPL-MCNC: 4.1 G/DL (ref 3.9–4.9)
ALP BLD-CCNC: 64 U/L (ref 35–104)
ALT SERPL-CCNC: 49 U/L (ref 0–41)
ANION GAP SERPL CALCULATED.3IONS-SCNC: 15 MEQ/L (ref 7–13)
AST SERPL-CCNC: 37 U/L (ref 0–40)
BASOPHILS ABSOLUTE: 0 K/UL (ref 0–0.2)
BASOPHILS RELATIVE PERCENT: 0.2 %
BILIRUB SERPL-MCNC: 0.6 MG/DL (ref 0–1.2)
BUN BLDV-MCNC: 29 MG/DL (ref 6–20)
CALCIUM SERPL-MCNC: 9.5 MG/DL (ref 8.6–10.2)
CHLORIDE BLD-SCNC: 99 MEQ/L (ref 98–107)
CO2: 28 MEQ/L (ref 22–29)
CREAT SERPL-MCNC: 1.14 MG/DL (ref 0.7–1.2)
EOSINOPHILS ABSOLUTE: 0.2 K/UL (ref 0–0.7)
EOSINOPHILS RELATIVE PERCENT: 2.3 %
GFR AFRICAN AMERICAN: >60
GFR NON-AFRICAN AMERICAN: >60
GLOBULIN: 3.7 G/DL (ref 2.3–3.5)
GLUCOSE BLD-MCNC: 126 MG/DL (ref 74–109)
HBA1C MFR BLD: 6.8 % (ref 4.8–5.9)
HCT VFR BLD CALC: 44 % (ref 42–52)
HEMOGLOBIN: 14.7 G/DL (ref 14–18)
LYMPHOCYTES ABSOLUTE: 2.5 K/UL (ref 1–4.8)
LYMPHOCYTES RELATIVE PERCENT: 32.8 %
MCH RBC QN AUTO: 26.9 PG (ref 27–31.3)
MCHC RBC AUTO-ENTMCNC: 33.5 % (ref 33–37)
MCV RBC AUTO: 80.3 FL (ref 80–100)
MONOCYTES ABSOLUTE: 0.8 K/UL (ref 0.2–0.8)
MONOCYTES RELATIVE PERCENT: 10.6 %
NEUTROPHILS ABSOLUTE: 4.1 K/UL (ref 1.4–6.5)
NEUTROPHILS RELATIVE PERCENT: 54.1 %
PDW BLD-RTO: 14 % (ref 11.5–14.5)
PLATELET # BLD: 293 K/UL (ref 130–400)
POTASSIUM SERPL-SCNC: 3.3 MEQ/L (ref 3.5–5.1)
RBC # BLD: 5.48 M/UL (ref 4.7–6.1)
SODIUM BLD-SCNC: 142 MEQ/L (ref 132–144)
TOTAL PROTEIN: 7.8 G/DL (ref 6.4–8.1)
WBC # BLD: 7.5 K/UL (ref 4.8–10.8)

## 2019-01-14 PROCEDURE — 99214 OFFICE O/P EST MOD 30 MIN: CPT | Performed by: INTERNAL MEDICINE

## 2019-01-14 RX ORDER — ATENOLOL 50 MG/1
50 TABLET ORAL DAILY
Qty: 90 TABLET | Refills: 3 | Status: SHIPPED | OUTPATIENT
Start: 2019-01-14 | End: 2019-05-06 | Stop reason: ALTCHOICE

## 2019-01-14 RX ORDER — EPLERENONE 50 MG/1
50 TABLET, FILM COATED ORAL 2 TIMES DAILY
Qty: 180 TABLET | Refills: 1 | Status: SHIPPED | OUTPATIENT
Start: 2019-01-14 | End: 2019-08-16 | Stop reason: SDUPTHER

## 2019-01-14 RX ORDER — TELMISARTAN 80 MG/1
80 TABLET ORAL DAILY
Qty: 90 TABLET | Refills: 3 | Status: SHIPPED | OUTPATIENT
Start: 2019-01-14 | End: 2019-09-06 | Stop reason: SDUPTHER

## 2019-01-14 RX ORDER — TAMOXIFEN CITRATE 10 MG/1
TABLET ORAL
Qty: 180 TABLET | Refills: 3 | Status: SHIPPED | OUTPATIENT
Start: 2019-01-14 | End: 2019-09-06 | Stop reason: SDUPTHER

## 2019-01-14 RX ORDER — AMLODIPINE BESYLATE 5 MG/1
5 TABLET ORAL DAILY
Qty: 90 TABLET | Refills: 3 | Status: SHIPPED | OUTPATIENT
Start: 2019-01-14 | End: 2019-09-06 | Stop reason: SDUPTHER

## 2019-01-14 RX ORDER — LEVOTHYROXINE SODIUM 0.12 MG/1
125 TABLET ORAL DAILY
Qty: 90 TABLET | Refills: 3 | Status: SHIPPED | OUTPATIENT
Start: 2019-01-14 | End: 2019-09-06 | Stop reason: SDUPTHER

## 2019-01-14 ASSESSMENT — ENCOUNTER SYMPTOMS
EYE PAIN: 0
BACK PAIN: 0
ABDOMINAL PAIN: 0
SHORTNESS OF BREATH: 0

## 2019-02-25 DIAGNOSIS — E03.9 HYPOTHYROIDISM, UNSPECIFIED TYPE: ICD-10-CM

## 2019-02-25 LAB
ANION GAP SERPL CALCULATED.3IONS-SCNC: 12 MEQ/L (ref 9–15)
BUN BLDV-MCNC: 13 MG/DL (ref 6–20)
CALCIUM SERPL-MCNC: 8.5 MG/DL (ref 8.5–9.9)
CHLORIDE BLD-SCNC: 101 MEQ/L (ref 95–107)
CO2: 31 MEQ/L (ref 20–31)
CREAT SERPL-MCNC: 0.95 MG/DL (ref 0.7–1.2)
GFR AFRICAN AMERICAN: >60
GFR NON-AFRICAN AMERICAN: >60
GLUCOSE BLD-MCNC: 129 MG/DL (ref 70–99)
HBA1C MFR BLD: 6.6 % (ref 4.8–5.9)
POTASSIUM SERPL-SCNC: 3.1 MEQ/L (ref 3.4–4.9)
SODIUM BLD-SCNC: 144 MEQ/L (ref 135–144)
T4 FREE: 1.32 NG/DL (ref 0.84–1.68)
TSH SERPL DL<=0.05 MIU/L-ACNC: 1.32 UIU/ML (ref 0.44–3.86)

## 2019-03-01 ENCOUNTER — OFFICE VISIT (OUTPATIENT)
Dept: ENDOCRINOLOGY | Age: 59
End: 2019-03-01
Payer: COMMERCIAL

## 2019-03-01 VITALS
WEIGHT: 249 LBS | HEIGHT: 72 IN | HEART RATE: 59 BPM | DIASTOLIC BLOOD PRESSURE: 84 MMHG | SYSTOLIC BLOOD PRESSURE: 154 MMHG | BODY MASS INDEX: 33.72 KG/M2

## 2019-03-01 DIAGNOSIS — I10 ESSENTIAL HYPERTENSION: ICD-10-CM

## 2019-03-01 DIAGNOSIS — E03.9 HYPOTHYROIDISM, UNSPECIFIED TYPE: ICD-10-CM

## 2019-03-01 DIAGNOSIS — B35.1 ONYCHOMYCOSIS: ICD-10-CM

## 2019-03-01 DIAGNOSIS — E87.6 HYPOKALEMIA: ICD-10-CM

## 2019-03-01 DIAGNOSIS — E11.65 UNCONTROLLED TYPE 2 DIABETES MELLITUS WITH HYPERGLYCEMIA (HCC): Primary | ICD-10-CM

## 2019-03-01 LAB — GLUCOSE BLD-MCNC: 149 MG/DL

## 2019-03-01 PROCEDURE — 99214 OFFICE O/P EST MOD 30 MIN: CPT | Performed by: INTERNAL MEDICINE

## 2019-03-01 PROCEDURE — 82962 GLUCOSE BLOOD TEST: CPT | Performed by: INTERNAL MEDICINE

## 2019-03-01 RX ORDER — POTASSIUM CHLORIDE 750 MG/1
10 TABLET, FILM COATED, EXTENDED RELEASE ORAL DAILY
Qty: 90 TABLET | Refills: 3 | Status: SHIPPED | OUTPATIENT
Start: 2019-03-01 | End: 2019-09-06 | Stop reason: SDUPTHER

## 2019-03-21 LAB
CHOLESTEROL, TOTAL: 110 MG/DL (ref 0–199)
HDLC SERPL-MCNC: 31 MG/DL (ref 40–59)
LDL CHOLESTEROL CALCULATED: 58 MG/DL (ref 0–129)
TRIGL SERPL-MCNC: 103 MG/DL (ref 0–150)

## 2019-03-28 ENCOUNTER — TELEPHONE (OUTPATIENT)
Dept: FAMILY MEDICINE CLINIC | Age: 59
End: 2019-03-28

## 2019-03-28 ENCOUNTER — NURSE TRIAGE (OUTPATIENT)
Dept: OTHER | Facility: CLINIC | Age: 59
End: 2019-03-28

## 2019-03-28 ENCOUNTER — HOSPITAL ENCOUNTER (OUTPATIENT)
Dept: ULTRASOUND IMAGING | Age: 59
Discharge: HOME | End: 2019-03-30
Payer: COMMERCIAL

## 2019-03-28 ENCOUNTER — OFFICE VISIT (OUTPATIENT)
Dept: FAMILY MEDICINE CLINIC | Age: 59
End: 2019-03-28
Payer: COMMERCIAL

## 2019-03-28 VITALS
BODY MASS INDEX: 33.86 KG/M2 | SYSTOLIC BLOOD PRESSURE: 128 MMHG | RESPIRATION RATE: 16 BRPM | DIASTOLIC BLOOD PRESSURE: 80 MMHG | HEIGHT: 72 IN | TEMPERATURE: 97.6 F | HEART RATE: 60 BPM | WEIGHT: 250 LBS

## 2019-03-28 DIAGNOSIS — M79.89 RIGHT LEG SWELLING: ICD-10-CM

## 2019-03-28 DIAGNOSIS — M79.89 RIGHT LEG SWELLING: Primary | ICD-10-CM

## 2019-03-28 DIAGNOSIS — I80.01 THROMBOPHLEBITIS OF SUPERFICIAL VEINS OF RIGHT LOWER EXTREMITY: ICD-10-CM

## 2019-03-28 PROCEDURE — 93971 EXTREMITY STUDY: CPT

## 2019-03-28 PROCEDURE — 99213 OFFICE O/P EST LOW 20 MIN: CPT | Performed by: NURSE PRACTITIONER

## 2019-03-28 ASSESSMENT — ENCOUNTER SYMPTOMS
CHEST TIGHTNESS: 0
WHEEZING: 0
NAUSEA: 0
VOMITING: 0
COUGH: 0
SHORTNESS OF BREATH: 0
ABDOMINAL PAIN: 0

## 2019-03-28 NOTE — TELEPHONE ENCOUNTER
Patient notified of results of ultrasound: no DVT or thrombophlebitis noted. Follow up with Dr. Alina Guzman within the next 1-2 weeks.     Carine Obando, CNP

## 2019-04-08 RX ORDER — EFINACONAZOLE 100 MG/ML
SOLUTION TOPICAL
Qty: 4 ML | Refills: 0 | Status: SHIPPED | OUTPATIENT
Start: 2019-04-08 | End: 2019-06-03 | Stop reason: SDUPTHER

## 2019-04-15 ENCOUNTER — OFFICE VISIT (OUTPATIENT)
Dept: FAMILY MEDICINE CLINIC | Age: 59
End: 2019-04-15
Payer: COMMERCIAL

## 2019-04-15 VITALS
BODY MASS INDEX: 33.13 KG/M2 | SYSTOLIC BLOOD PRESSURE: 114 MMHG | TEMPERATURE: 98.3 F | RESPIRATION RATE: 15 BRPM | OXYGEN SATURATION: 97 % | HEIGHT: 72 IN | HEART RATE: 56 BPM | DIASTOLIC BLOOD PRESSURE: 76 MMHG | WEIGHT: 244.6 LBS

## 2019-04-15 DIAGNOSIS — E66.01 MORBID OBESITY (HCC): ICD-10-CM

## 2019-04-15 DIAGNOSIS — I87.8 VENOUS STASIS: ICD-10-CM

## 2019-04-15 DIAGNOSIS — I10 ESSENTIAL HYPERTENSION: ICD-10-CM

## 2019-04-15 DIAGNOSIS — E87.6 HYPOKALEMIA: ICD-10-CM

## 2019-04-15 DIAGNOSIS — Z12.5 SCREENING FOR PROSTATE CANCER: ICD-10-CM

## 2019-04-15 DIAGNOSIS — E11.9 CONTROLLED TYPE 2 DIABETES MELLITUS WITHOUT COMPLICATION, WITHOUT LONG-TERM CURRENT USE OF INSULIN (HCC): Primary | ICD-10-CM

## 2019-04-15 DIAGNOSIS — I83.91 VARICOSE VEINS OF RIGHT LOWER EXTREMITY, UNSPECIFIED WHETHER COMPLICATED: ICD-10-CM

## 2019-04-15 DIAGNOSIS — E11.9 CONTROLLED TYPE 2 DIABETES MELLITUS WITHOUT COMPLICATION, WITHOUT LONG-TERM CURRENT USE OF INSULIN (HCC): ICD-10-CM

## 2019-04-15 LAB
POTASSIUM SERPL-SCNC: 3.6 MEQ/L (ref 3.4–4.9)
PROSTATE SPECIFIC ANTIGEN: 1.61 NG/ML (ref 0–3.89)
TOTAL CK: 250 U/L (ref 0–190)

## 2019-04-15 PROCEDURE — 99214 OFFICE O/P EST MOD 30 MIN: CPT | Performed by: INTERNAL MEDICINE

## 2019-04-15 ASSESSMENT — PATIENT HEALTH QUESTIONNAIRE - PHQ9
SUM OF ALL RESPONSES TO PHQ9 QUESTIONS 1 & 2: 0
2. FEELING DOWN, DEPRESSED OR HOPELESS: 0
SUM OF ALL RESPONSES TO PHQ QUESTIONS 1-9: 0
1. LITTLE INTEREST OR PLEASURE IN DOING THINGS: 0
SUM OF ALL RESPONSES TO PHQ QUESTIONS 1-9: 0

## 2019-04-15 ASSESSMENT — ENCOUNTER SYMPTOMS
BACK PAIN: 0
ABDOMINAL PAIN: 0
SHORTNESS OF BREATH: 0
EYE PAIN: 0

## 2019-04-15 NOTE — PATIENT INSTRUCTIONS
Patient Education        Learning About ARBs  Introduction    ARBs (angiotensin II receptor blockers) block a hormone that makes blood vessels narrow. As a result, the blood vessels relax and widen. This lowers blood pressure. ARBs also put more water and salt into the urine. This also lowers blood pressure. ARBs can treat:  · High blood pressure. · Coronary artery disease. · Heart failure. They also may be used to help your kidneys when you have diabetes. Examples  ARBs include:  · Candesartan (Atacand). · Irbesartan (Avapro). · Losartan (Cozaar). This is not a complete list of all ARBs. Possible side effects  Side effects may include:  · Low blood pressure. You may feel dizzy and weak. · Diarrhea. · High potassium levels. You may have other side effects or reactions not listed here. Check the information that comes with your medicine. What to know about taking this medicine  · ARBs may be used if you had a cough when you tried to take an ACE inhibitor. ARBs are less likely to cause a cough. · You may need regular blood tests. · Take your medicines exactly as prescribed. Call your doctor if you think you are having a problem with your medicine. · Tell your doctor or pharmacist all the medicines you take. This includes over-the-counter medicines, vitamins, herbal products, and supplements. Taking some medicines together can cause problems. · You should not take ARBs if you are pregnant or planning to become pregnant. Where can you learn more? Go to https://[employer]. org and sign in to your [employer] account. Enter K212 in the [facility] box to learn more about \"Learning About ARBs. \"     If you do not have an account, please click on the \"Sign Up Now\" link. Current as of: July 22, 2018  Content Version: 11.9  © 9165-3347 [employer]. Care instructions adapted under license by [facility] [place] ([facility]).  If you have questions about a medical condition or this instruction, always ask your healthcare professional. [place] [address] any warranty or liability for your use of this information.

## 2019-04-15 NOTE — PROGRESS NOTES
Subjective:      Patient ID: Wilbur Lu is a 62 y.o. male who presents today with:  Chief Complaint   Patient presents with    3 Month Follow-Up    Diabetes     patient mentions his BS readings have been better     Obesity    Hypertension       HPI   Diabetes-Diabetes-Chronic, type 2, known hypertension and obesity. Known hyperlipidemia. Improved with Janumet 50/500 1 tab po bid. Checks BS daily. Obesity-Obesity-Chronic, BMI of 33  Not exercising regularly. Known co-existing hypertension and hyperlipidemia. He has known varicose veins on right and venous stasis. Last duplex in late March was negative for DVT. He is not on anticoagulation. Hypertension--Chronic, essential. Not compliant with low sodium diet. Known obesity. Improved with amlodipine 5 mg once daily, telmisartan 80 mg daily, atenolol 50 mg once daily. Past Medical History:   Diagnosis Date    Gynecomastia     History of hypokalemia     Hyperaldosteronism, unspecified ([facility] [address]     Hypertension     Hypogonadism male     Hypokalemia     Hypothyroidism     Osteoarthritis     Type II or unspecified type diabetes mellitus without mention of complication, not stated as uncontrolled      Past Surgical History:   Procedure Laterality Date    JOINT REPLACEMENT      left quadracep tendon rupture repair w/ anchors     Social History     Socioeconomic History    Marital status: Single     Spouse name: Not on file    Number of children: Not on file    Years of education: Not on file    Highest education level: Not on file   Occupational History    Occupation: resp.  therapist   Social Needs    Financial resource strain: Not on file    Food insecurity:     Worry: Not on file     Inability: Not on file    Transportation needs:     Medical: Not on file     Non-medical: Not on file   Tobacco Use    Smoking status: Never Smoker    Smokeless tobacco: Never Used   Substance and Sexual Activity    Alcohol use: No     Alcohol/week: 0.0 oz    Drug use: No    Sexual activity: Not on file     Comment: single   Lifestyle    Physical activity:     Days per week: Not on file     Minutes per session: Not on file    Stress: Not on file   Relationships    Social connections:     Talks on phone: Not on file     Gets together: Not on file     Attends [employer]ism service: Not on file     Active member of club or organization: Not on file     Attends meetings of clubs or organizations: Not on file     Relationship status: Not on file    Intimate partner violence:     Fear of current or ex partner: Not on file     Emotionally abused: Not on file     Physically abused: Not on file     Forced sexual activity: Not on file   Other Topics Concern    Not on file   Social History Narrative    Not on file     Allergies   Allergen Reactions    Morphine     Invokana [Canagliflozin]      UTI and yeast infection     Shellfish-Derived Products Swelling     Current Outpatient Medications on File Prior to Visit   Medication Sig Dispense Refill    JUBLIA 10 % SOLN APPLY ONCE A DAY 4 mL 0    potassium chloride (KLOR-CON) 10 MEQ extended release tablet Take 1 tablet by mouth daily 90 tablet 3    sitaGLIPtan-metformin (JANUMET)  MG per tablet Take 1 tablet by mouth 2 times daily (with meals) 180 tablet 1    tamoxifen (NOLVADEX) 10 MG tablet Take 1 tablet by mouth  twice a day 180 tablet 3    levothyroxine (SYNTHROID) 125 MCG tablet Take 1 tablet by mouth daily 90 tablet 03    eplerenone (INSPRA) 50 MG tablet Take 1 tablet by mouth 2 times daily 180 tablet 1    amLODIPine (NORVASC) 5 MG tablet Take 1 tablet by mouth daily 90 tablet 3    telmisartan (MICARDIS) 80 MG tablet Take 1 tablet by mouth daily 90 tablet 3    atenolol (TENORMIN) 50 MG tablet Take 1 tablet by mouth daily 90 tablet 3    diclofenac 2 % SOLN Apply one application twice daily to left heel pain.  1 Bottle 2    Blood Glucose Monitoring Suppl (AGAMATRIX PRESTO) w/Device KIT Please give 1 meter dx e11.65 1 kit 0    SAFETY LANCETS MISC Safety pro lancets, use one daily 100 each 3    glucose blood VI test strips (FREESTYLE INSULINX TEST) strip Test once daily 100 strip 3    zoster recombinant adjuvanted vaccine (SHINGRIX) 50 MCG/0.5ML SUSR injection Inject 0.5 mLs into the muscle See Admin Instructions 1 dose now and repeat in 2-6 months 1 each 0     No current facility-administered medications on file prior to visit. I have personally reviewed the ROS, PMH, PFH, and social history     Review of Systems   Constitutional: Negative for chills. HENT: Negative for congestion. Eyes: Negative for pain. Respiratory: Negative for shortness of breath. Cardiovascular: Negative for chest pain. Gastrointestinal: Negative for abdominal pain. Genitourinary: Negative for hematuria. Musculoskeletal: Negative for back pain. Allergic/Immunologic: Negative for immunocompromised state. Neurological: Negative for headaches. Psychiatric/Behavioral: Negative for hallucinations. Objective:   /86 (Site: Left Upper Arm, Position: Sitting, Cuff Size: Large Adult)   Pulse 56   Temp 98.3 °F (36.8 °C) (Tympanic)   Resp 15   Ht 6' (1.829 m)   Wt 244 lb 9.6 oz (110.9 kg)   SpO2 97%   BMI 33.17 kg/m²     Physical Exam   Constitutional: He is oriented to person, place, and time. He appears well-developed and well-nourished. HENT:   Head: Normocephalic. Eyes: Pupils are equal, round, and reactive to light. Neck: No tracheal deviation present. Cardiovascular: Normal rate, regular rhythm and normal heart sounds. Exam reveals no gallop and no friction rub. No murmur heard. Pulmonary/Chest: No respiratory distress. Abdominal: Soft. Bowel sounds are normal. He exhibits no distension. There is no rebound. Musculoskeletal: He exhibits no edema. Neurological: He is oriented to person, place, and time. Skin: Skin is warm and dry.            Assessment:       Diagnosis Orders 1. Controlled type 2 diabetes mellitus without complication, without long-term current use of insulin (HCC)  CK   2. Morbid obesity (HCC)  CK   3. Essential hypertension  CK   4. Hypokalemia  Potassium   5. Venous stasis  Compression Keith Garcia MD, Interventional Radiology, Dev   6. Varicose veins of right lower extremity, unspecified whether complicated  Compression [facility] Allyson Boggs MD, Interventional RadiologyDev   7. Screening for prostate cancer  PSA Screening         Plan:   Get labs as ordered through Dr. Norbert Adler. Potassium and cpk today  No changes to diabetic meds, a1c at goal  Rec ongoing weight loss  Keep a bp log and call me if >130/80  Risk of stroke, MI, etc  Refused HERMAN  PSA ordered. Follow up in 3 months. Understands risk of prostate cancer and that testing is controversial.   Orders Placed This Encounter   Procedures    Potassium     Standing Status:   Future     Standing Expiration Date:   4/15/2020    CK     Standing Status:   Future     Standing Expiration Date:   4/15/2020    PSA Screening     Standing Status:   Future     Standing Expiration Date:   4/15/2020   Melvin Bonner MD, Interventional Radiology, Dev     Referral Priority:   Routine     Referral Type:   Eval and Treat     Referral Reason:   Specialty Services Required     Referred to Provider:   Neetu Campoverde MD     Requested Specialty:   Radiology     Number of Visits Requested:   1    Compression Stocking     Knee high, 30 to 40 mm hg     No orders of the defined types were placed in this encounter. Return in about 3 months (around 7/15/2019) for worsening symptoms, call ASAP for appointment, regularly scheduled appointment with PCP. Roosevelt Gould MD    If anything should change or worsen call ASAP, don't wait for next scheduled appointment.

## 2019-05-06 ENCOUNTER — OFFICE VISIT (OUTPATIENT)
Dept: INTERVENTIONAL RADIOLOGY/VASCULAR | Age: 59
End: 2019-05-06
Payer: COMMERCIAL

## 2019-05-06 VITALS
SYSTOLIC BLOOD PRESSURE: 134 MMHG | RESPIRATION RATE: 14 BRPM | BODY MASS INDEX: 32.77 KG/M2 | HEART RATE: 75 BPM | OXYGEN SATURATION: 99 % | WEIGHT: 241.6 LBS | DIASTOLIC BLOOD PRESSURE: 90 MMHG

## 2019-05-06 DIAGNOSIS — I87.2 EDEMA OF BOTH LOWER EXTREMITIES DUE TO PERIPHERAL VENOUS INSUFFICIENCY: Primary | ICD-10-CM

## 2019-05-06 DIAGNOSIS — I83.813 VARICOSE VEINS OF BOTH LOWER EXTREMITIES WITH PAIN: ICD-10-CM

## 2019-05-06 PROCEDURE — 99245 OFF/OP CONSLTJ NEW/EST HI 55: CPT | Performed by: NURSE PRACTITIONER

## 2019-05-06 RX ORDER — ATENOLOL AND CHLORTHALIDONE TABLET 100; 25 MG/1; MG/1
1 TABLET ORAL DAILY
COMMUNITY
End: 2019-09-06 | Stop reason: SDUPTHER

## 2019-05-06 ASSESSMENT — ENCOUNTER SYMPTOMS
EYE REDNESS: 0
EYE ITCHING: 0
WHEEZING: 0
VOMITING: 0
SINUS PAIN: 0
NAUSEA: 0
BACK PAIN: 1
ABDOMINAL PAIN: 0
SORE THROAT: 0
SHORTNESS OF BREATH: 0
TROUBLE SWALLOWING: 0
DIARRHEA: 0
COUGH: 0
EYE DISCHARGE: 0
ABDOMINAL DISTENTION: 0
SINUS PRESSURE: 0
CONSTIPATION: 0
EYE PAIN: 0

## 2019-05-06 NOTE — PROGRESS NOTES
Ana Lilia Melgar, a male of 62 y.o. came to the office 5/6/2019. Chief Complaint   Patient presents with    Leg Pain     Pt here for consult for DVT in Rt leg. Pt states he does wear compression stockings. Pt has had leg pain and swelling and his leg feels warm to touch when he has had DVT's in the past.       HPI: Patient presents to clinic as referral for consultation from PCP for evaluation of both legs with varicose veins that are ropey, prominent swelling and pain. RLE > LLE. Going on for at 2-3 years with progression in symptoms. H/O Right GSV thrombophlebitis with last occurrence on 8/2018. No DVT's noted. Both legs with chronic swelling, aching, pain, throbbing, fatigue, cramping, heaviness going on for several years with progression in symptoms. Patient presents with symptoms of: He has learned to live through pain however pain is now making ADL's more difficult to perform. He exercises at least 4/week. He manages his weight and diet. Intentional weight loss of 20lb/4 months with dieting. 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 NSAIDS: Does not take any pain medication as he's learned to live with pain. Leg elevation: Elevates daily with moderate relief. Stocking use and dates: Has been wearing daily compression for over a year without relief. Compression used to relieve symptoms however they no longer provide relief. Claudication: Denies.      Family History   Problem Relation Age of Onset    Heart Disease Mother     Arthritis Mother     Dementia Mother     Depression Mother     Diabetes Father     Arthritis Father     Heart Disease Father        Past Surgical History:   Procedure Laterality Date    JOINT REPLACEMENT      left quadracep tendon rupture repair w/ anchors        Past Medical History:   Diagnosis Date    Gynecomastia     History of hypokalemia     [facility]     Hypertension     Hypogonadism male     Hypokalemia     Hypothyroidism     Osteoarthritis     Type II or unspecified type diabetes mellitus without mention of complication, not stated as uncontrolled        Social History     Socioeconomic History    Marital status: Single     Spouse name: None    Number of children: None    Years of education: None    Highest education level: None   Occupational History    Occupation: resp.  therapist   Social Needs    Financial resource strain: None    Food insecurity:     Worry: None     Inability: None    Transportation needs:     Medical: None     Non-medical: None   Tobacco Use    Smoking status: Never Smoker    Smokeless tobacco: Never Used   Substance and Sexual Activity    Alcohol use: No     Alcohol/week: 0.0 oz    Drug use: No    Sexual activity: None     Comment: single   Lifestyle    Physical activity:     Days per week: None     Minutes per session: None    Stress: None   Relationships    Social connections:     Talks on phone: None     Gets together: None     Attends Roman Catholic service: None     Active member of club or organization: None     Attends meetings of clubs or organizations: None     Relationship status: None    Intimate partner violence:     Fear of current or ex partner: None     Emotionally abused: None     Physically abused: None     Forced sexual activity: None   Other Topics Concern    None   Social History Narrative    None       Allergies   Allergen Reactions    Morphine     Invokana [Canagliflozin]      UTI and yeast infection     Shellfish-Derived Products Swelling       Current Outpatient Medications on File Prior to Visit   Medication Sig Dispense Refill    atenolol-chlorthalidone (TENORETIC) 100-25 MG per tablet Take 1 tablet by mouth daily      JUBLIA 10 % SOLN APPLY ONCE A DAY 4 mL 0    potassium chloride (KLOR-CON) 10 MEQ extended release tablet Take 1 tablet by mouth daily 90 tablet 3    sitaGLIPtan-metformin (JANUMET)  MG per tablet Take 1 tablet by mouth 2 times daily (with meals) 180 tablet 1    tamoxifen (NOLVADEX) 10 MG tablet Take 1 tablet by mouth  twice a day 180 tablet 3    levothyroxine (SYNTHROID) 125 MCG tablet Take 1 tablet by mouth daily 90 tablet 03    eplerenone (INSPRA) 50 MG tablet Take 1 tablet by mouth 2 times daily 180 tablet 1    amLODIPine (NORVASC) 5 MG tablet Take 1 tablet by mouth daily 90 tablet 3    telmisartan (MICARDIS) 80 MG tablet Take 1 tablet by mouth daily 90 tablet 3    diclofenac 2 % SOLN Apply one application twice daily to left heel pain. 1 Bottle 2    Compression Stockings MISC by Does not apply route Thigh- high 30-40mmhg  Dx 187.8; 183.31 1 each 0    zoster recombinant adjuvanted vaccine (SHINGRIX) 50 MCG/0.5ML SUSR injection Inject 0.5 mLs into the muscle See Admin Instructions 1 dose now and repeat in 2-6 months 1 each 0    Blood Glucose Monitoring Suppl (AGAMATRIX PRESTO) w/Device KIT Please give 1 meter dx e11.65 1 kit 0    SAFETY LANCETS MISC Safety pro lancets, use one daily 100 each 3    glucose blood VI test strips (FREESTYLE INSULINX TEST) strip Test once daily 100 strip 3     No current facility-administered medications on file prior to visit. Review of Systems   Constitutional: Negative for activity change, appetite change, chills, fatigue and fever. HENT: Negative for congestion, ear pain, nosebleeds, sinus pressure, sinus pain, sore throat and trouble swallowing. Eyes: Negative for pain, discharge, redness and itching. Respiratory: Negative for cough, shortness of breath and wheezing. Cardiovascular: Positive for leg swelling. Negative for chest pain and palpitations. Gastrointestinal: Negative for abdominal distention, abdominal pain, constipation, diarrhea, nausea and vomiting. Endocrine: Negative for cold intolerance and heat intolerance.    Genitourinary: Negative for dysuria, tender to palpation. Neurological: He is alert and oriented to person, place, and time. No cranial nerve deficit. Coordination normal.   Skin: Skin is warm and dry. No rash noted. He is not diaphoretic. No erythema. No pallor. Psychiatric: He has a normal mood and affect. His behavior is normal. Judgment and thought content normal.                           ASSESSMENT AND PLAN:    Assessment:   1. US in office shows: Chronic Venous Insufficiency to bilateral GSV causing Both legs with chronic swelling, aching, pain, throbbing, fatigue, cramping, heaviness and  both legs with varicose veins that are ropey, prominent swelling and pain. Going on for at 2-3 years with progression in symptoms not relieved with compression. Plan:   1. Submit to insurance. If approved will schedule for: Bilateral GSV RFA and distal sclerotherapy, bilateral VV phlebectomy. Disease process of CVI and procedures discussed with patient. He wishes to proceed if insurance approved. Start with Rt as this is more symptomatic. 2. RX for waist high compression to bring to each procedure and wear daily and off nightly. Thank You Dr. Jose Purdy for referral of your patient to our practice for care.      Lucie Fernandez, APRN - CNP

## 2019-05-13 ENCOUNTER — EMPLOYEE WELLNESS (OUTPATIENT)
Dept: OTHER | Age: 59
End: 2019-05-13

## 2019-05-13 LAB
CHOLESTEROL, TOTAL: 108 MG/DL (ref 0–199)
GLUCOSE BLD-MCNC: 79 MG/DL (ref 70–99)
HDLC SERPL-MCNC: 32 MG/DL (ref 40–59)
LDL CHOLESTEROL CALCULATED: 51 MG/DL (ref 0–129)
TRIGL SERPL-MCNC: 124 MG/DL (ref 0–150)

## 2019-05-28 VITALS — WEIGHT: 240 LBS | BODY MASS INDEX: 32.55 KG/M2

## 2019-05-28 DIAGNOSIS — E11.65 UNCONTROLLED TYPE 2 DIABETES MELLITUS WITH HYPERGLYCEMIA (HCC): ICD-10-CM

## 2019-05-28 DIAGNOSIS — E03.9 HYPOTHYROIDISM, UNSPECIFIED TYPE: ICD-10-CM

## 2019-05-28 LAB
ANION GAP SERPL CALCULATED.3IONS-SCNC: 9 MEQ/L (ref 9–15)
BUN BLDV-MCNC: 12 MG/DL (ref 6–20)
CALCIUM SERPL-MCNC: 9.2 MG/DL (ref 8.5–9.9)
CHLORIDE BLD-SCNC: 101 MEQ/L (ref 95–107)
CO2: 31 MEQ/L (ref 20–31)
CREAT SERPL-MCNC: 0.85 MG/DL (ref 0.7–1.2)
GFR AFRICAN AMERICAN: >60
GFR NON-AFRICAN AMERICAN: >60
GLUCOSE BLD-MCNC: 115 MG/DL (ref 70–99)
HBA1C MFR BLD: 6.5 % (ref 4.8–5.9)
POTASSIUM SERPL-SCNC: 3.4 MEQ/L (ref 3.4–4.9)
SODIUM BLD-SCNC: 141 MEQ/L (ref 135–144)
T4 FREE: 1.37 NG/DL (ref 0.84–1.68)
TSH SERPL DL<=0.05 MIU/L-ACNC: 1.83 UIU/ML (ref 0.44–3.86)

## 2019-06-03 ENCOUNTER — OFFICE VISIT (OUTPATIENT)
Dept: ENDOCRINOLOGY | Age: 59
End: 2019-06-03
Payer: COMMERCIAL

## 2019-06-03 VITALS
HEIGHT: 72 IN | HEART RATE: 51 BPM | WEIGHT: 244 LBS | DIASTOLIC BLOOD PRESSURE: 83 MMHG | BODY MASS INDEX: 33.05 KG/M2 | SYSTOLIC BLOOD PRESSURE: 132 MMHG

## 2019-06-03 DIAGNOSIS — E03.9 HYPOTHYROIDISM, UNSPECIFIED TYPE: ICD-10-CM

## 2019-06-03 DIAGNOSIS — E29.1 HYPOGONADISM MALE: ICD-10-CM

## 2019-06-03 DIAGNOSIS — E11.65 UNCONTROLLED TYPE 2 DIABETES MELLITUS WITH HYPERGLYCEMIA (HCC): Primary | ICD-10-CM

## 2019-06-03 LAB
CHP ED QC CHECK: NORMAL
GLUCOSE BLD-MCNC: 134 MG/DL

## 2019-06-03 PROCEDURE — 82962 GLUCOSE BLOOD TEST: CPT | Performed by: INTERNAL MEDICINE

## 2019-06-03 PROCEDURE — 99213 OFFICE O/P EST LOW 20 MIN: CPT | Performed by: INTERNAL MEDICINE

## 2019-06-03 NOTE — PROGRESS NOTES
Subjective:      Patient ID: Slick Baker is a 62 y.o. male. 3  months follow-up diabetes hypothyroidism hypogonadism     Diabetes   He presents for his follow-up diabetic visit. He has type 2 diabetes mellitus. Symptoms are stable. Current diabetic treatment includes oral agent (dual therapy) (janumet ). His overall blood glucose range is 110-130 mg/dl. (Lab Results       Component                Value               Date                       LABA1C                   6.5 (H)             05/28/2019              ) An ACE inhibitor/angiotensin II receptor blocker is being taken. Hypothyroidism replacement with Synthroid 125 µg daily     Hypokalemia   On potassium replacement   Reviewed labs with patient    Hypogonadism patient not on replacement complains of fatigue wants to have his level checks next time    Results for Oxana Cousins (MRN [id number]) as of 6/3/2019 09:33   Ref.  Range 5/28/2019 17:01   Sodium Latest Ref Range: 135 - 144 mEq/L 141   Potassium Latest Ref Range: 3.4 - 4.9 mEq/L 3.4   Chloride Latest Ref Range: 95 - 107 mEq/L 101   CO2 Latest Ref Range: 20 - 31 mEq/L 31   BUN Latest Ref Range: 6 - 20 mg/dL 12   Creatinine Latest Ref Range: 0.70 - 1.20 mg/dL 0.85   Anion Gap Latest Ref Range: 9 - 15 mEq/L 9   GFR Non- Latest Ref Range: >60  >60.0   GFR African American Latest Ref Range: >60  >60.0   Glucose Latest Ref Range: 70 - 99 mg/dL 115 (H)   Calcium Latest Ref Range: 8.5 - 9.9 mg/dL 9.2   Hemoglobin A1C Latest Ref Range: 4.8 - 5.9 % 6.5 (H)   TSH Latest Ref Range: 0.440 - 3.860 uIU/mL 1.830   T4 Free Latest Ref Range: 0.84 - 1.68 ng/dL 1.37         Patient Active Problem List   Diagnosis    Hypokalemia    Gynecomastia    Hypogonadism male    History of hypokalemia    Hyperaldosteronism (HCC)    Hypertension    Type II diabetes mellitus, uncontrolled (HCC)    Hypothyroidism    Obesity    Edema of both lower extremities due to peripheral venous insufficiency Allergies   Allergen Reactions    Morphine     Invokana [Canagliflozin]      UTI and yeast infection     Shellfish-Derived Products Swelling       Current Outpatient Medications:     atenolol-chlorthalidone (TENORETIC) 100-25 MG per tablet, Take 1 tablet by mouth daily, Disp: , Rfl:     Compression Stockings MISC, by Does not apply route Thigh- high 30-40mmhg Dx 187.8; 183.31, Disp: 1 each, Rfl: 0    JUBLIA 10 % SOLN, APPLY ONCE A DAY, Disp: 4 mL, Rfl: 0    potassium chloride (KLOR-CON) 10 MEQ extended release tablet, Take 1 tablet by mouth daily, Disp: 90 tablet, Rfl: 3    sitaGLIPtan-metformin (JANUMET)  MG per tablet, Take 1 tablet by mouth 2 times daily (with meals), Disp: 180 tablet, Rfl: 1    tamoxifen (NOLVADEX) 10 MG tablet, Take 1 tablet by mouth  twice a day, Disp: 180 tablet, Rfl: 3    levothyroxine (SYNTHROID) 125 MCG tablet, Take 1 tablet by mouth daily, Disp: 90 tablet, Rfl: 03    eplerenone (INSPRA) 50 MG tablet, Take 1 tablet by mouth 2 times daily, Disp: 180 tablet, Rfl: 1    amLODIPine (NORVASC) 5 MG tablet, Take 1 tablet by mouth daily, Disp: 90 tablet, Rfl: 3    telmisartan (MICARDIS) 80 MG tablet, Take 1 tablet by mouth daily, Disp: 90 tablet, Rfl: 3    diclofenac 2 % SOLN, Apply one application twice daily to left heel pain., Disp: 1 Bottle, Rfl: 2    Blood Glucose Monitoring Suppl (AGAMATRIX PRESTO) w/Device KIT, Please give 1 meter dx e11.65, Disp: 1 kit, Rfl: 0    SAFETY LANCETS MISC, Safety pro lancets, use one daily, Disp: 100 each, Rfl: 3    glucose blood VI test strips (FREESTYLE INSULINX TEST) strip, Test once daily, Disp: 100 strip, Rfl: 3    zoster recombinant adjuvanted vaccine (SHINGRIX) 50 MCG/0.5ML SUSR injection, Inject 0.5 mLs into the muscle See Admin Instructions 1 dose now and repeat in 2-6 months, Disp: 1 each, Rfl: 0    Review of Systems   All other systems reviewed and are negative.       Vitals:    06/03/19 0923   BP: 132/83   Pulse: 51 Weight: 244 lb (110.7 kg)   Height: 6' (1.829 m)       Objective:   Physical Exam   Constitutional: He appears well-developed and well-nourished. HENT:   Head: Normocephalic and atraumatic. Eyes: EOM are normal.   Neck: Neck supple. Cardiovascular: Bradycardia present. Pulmonary/Chest: Breath sounds normal.   Musculoskeletal: Normal range of motion. Feet:    Neurological: He is alert. Skin: Skin is warm. Psychiatric: He has a normal mood and affect. Assessment:       Diagnosis Orders   1. Uncontrolled type 2 diabetes mellitus with hyperglycemia (HCC)  Basic Metabolic Panel    Hemoglobin A1C    Microalbumin / Creatinine Urine Ratio    POCT Glucose   2. Hypothyroidism, unspecified type  T4, Free    TSH without Reflex   3.  Hypogonadism male  Testosterone Free And Total Male           Plan:      Orders Placed This Encounter   Procedures    POCT Glucose     Continue janumet 50/500  twice a day and Synthroid 125 µg daily    Continue other medications for blood pressure and potassium replacement we will recheck his testosterone level in addition to A1c BMP in 3 months time  Orders Placed This Encounter   Procedures    Basic Metabolic Panel     Standing Status:   Future     Standing Expiration Date:   6/3/2020    Hemoglobin A1C     Standing Status:   Future     Standing Expiration Date:   6/3/2020    Microalbumin / Creatinine Urine Ratio     Standing Status:   Future     Standing Expiration Date:   6/3/2020    T4, Free     Standing Status:   Future     Standing Expiration Date:   6/3/2020    TSH without Reflex     Standing Status:   Future     Standing Expiration Date:   6/3/2020    Testosterone Free And Total Male     Standing Status:   Future     Standing Expiration Date:   6/3/2020    POCT Glucose         Truman Hardy MD

## 2019-06-04 RX ORDER — EFINACONAZOLE 100 MG/ML
SOLUTION TOPICAL
Qty: 4 ML | Refills: 0 | Status: SHIPPED | OUTPATIENT
Start: 2019-06-04 | End: 2019-09-06

## 2019-07-03 DIAGNOSIS — I87.2 EDEMA OF BOTH LOWER EXTREMITIES DUE TO PERIPHERAL VENOUS INSUFFICIENCY: Primary | ICD-10-CM

## 2019-07-11 ENCOUNTER — PROCEDURE VISIT (OUTPATIENT)
Dept: INTERVENTIONAL RADIOLOGY/VASCULAR | Age: 59
End: 2019-07-11

## 2019-07-11 VITALS
HEART RATE: 58 BPM | SYSTOLIC BLOOD PRESSURE: 146 MMHG | BODY MASS INDEX: 33.09 KG/M2 | DIASTOLIC BLOOD PRESSURE: 86 MMHG | WEIGHT: 244 LBS

## 2019-07-11 DIAGNOSIS — I87.2 VENOUS INSUFFICIENCY: ICD-10-CM

## 2019-07-11 PROCEDURE — 99999 PR OFFICE/OUTPT VISIT,PROCEDURE ONLY: CPT | Performed by: RADIOLOGY

## 2019-07-11 NOTE — PATIENT INSTRUCTIONS
[address]  PHONE:  [phone]                                                              POST PROCEDURE INSTRUCTIONS                   1.    Wear compression stockings around the clock for 2 days. 2.    Sponge bath only. DO NOT shower for those 2 days. 3.    Do not drive for 24 hours after the procedure. 4.    You may remove the compression stocking after 2 days. 5.    You may shower after 2 days. 6.    Wear your compression stockings daily while awake. Put them on in the morning upon                           awakening, the legs are usually less swollen and they are easier to put on.              7.   Remove the compression stocking before bed. Hand wash and hang dry overnight as needed. 8.   Keep legs elevated above the heart level as often as possible. 9.   No jacuzzi's, hot tube, or bath for 2 weeks. 10.  No exercise or strenuous activity for 2 weeks. 11.  Do not lift anything greater than 5 pounds (one gallon of milk) for 2 weeks. 12   No sun exposure to the legs for one month. Wear pants or long skirts. No tanning beds. 13.  Use over the counter ibuprofen (Motrin) 400mg (2 tablets) 3 times a day as needed for pain. 14.  Follow up with Dr. Kenisha Dennison in 2 weeks as scheduled.   Patient Education

## 2019-07-14 PROBLEM — I87.2 VENOUS INSUFFICIENCY: Status: ACTIVE | Noted: 2019-07-14

## 2019-07-15 NOTE — PROGRESS NOTES
Impression:  1. Percutaneous ablation of the right great saphenous vein from near the sapheno-femoral junction to the level of the mid thigh using radiofrequency ablation. Clinical history:  62year-old male with varicosities of the right leg. Ultrasound evaluation demonstrates reflux of the right great saphenous vein. The patient is here for saphenous ablation. Procedure:  1. Ultrasound guidance for accessing the right great saphenous vein   2. Radiofrequency ablation of the right great saphenous vein    Body of Report: Informed and written consent was obtained from the patient following discussion of risks, benefits and alternatives to this procedure. The patient was placed supine on the procedure table. Sterile preparation of the right thigh and calf was performed. Ultrasound was used to study the target vein we intended to use prior to accessing it. The length of the vein was studied for its caliber and for internal echos or vein incompressibility to suggest thrombosis. 1% local lidocaine without epinephrine was administered prior to accessing the right great saphenous vein using ultrasound guidance and a 21 gauge micropuncture needle. The vein was entered at the level of the mid thigh. A 7 Czech sheath was then advanced through the saphenous vein. Through the sheath, a radiofrequency ablation catheter was advanced until the tip rests 3 cm peripheral to the junction of the right great saphenous and right femoral vein. Buffered 0.1% lidocaine with epinephrine in normal saline solution was then used to infiltrate around the saphenous vein providing both a thermal shield as well as compressing the saphenous vein against the sheath catheter. After the tumescent was administered the radiofrequency ablation catheter was activated and ablation was performed with 20 second intervals. The catheter and sheath was then removed and hemostasis obtained with direct pressure. The entry site was dressed sterilely.

## 2019-07-24 DIAGNOSIS — I87.2 EDEMA OF BOTH LOWER EXTREMITIES DUE TO PERIPHERAL VENOUS INSUFFICIENCY: Primary | ICD-10-CM

## 2019-07-24 DIAGNOSIS — I87.2 VENOUS INSUFFICIENCY: ICD-10-CM

## 2019-07-29 ENCOUNTER — OFFICE VISIT (OUTPATIENT)
Dept: INTERVENTIONAL RADIOLOGY/VASCULAR | Age: 59
End: 2019-07-29
Payer: COMMERCIAL

## 2019-07-29 VITALS
HEART RATE: 62 BPM | DIASTOLIC BLOOD PRESSURE: 90 MMHG | RESPIRATION RATE: 14 BRPM | SYSTOLIC BLOOD PRESSURE: 142 MMHG | OXYGEN SATURATION: 98 %

## 2019-07-29 DIAGNOSIS — I83.813 VARICOSE VEINS OF BILATERAL LOWER EXTREMITIES WITH PAIN: ICD-10-CM

## 2019-07-29 DIAGNOSIS — I87.2 EDEMA OF BOTH LOWER EXTREMITIES DUE TO PERIPHERAL VENOUS INSUFFICIENCY: Primary | ICD-10-CM

## 2019-07-29 PROCEDURE — 99213 OFFICE O/P EST LOW 20 MIN: CPT | Performed by: NURSE PRACTITIONER

## 2019-07-29 ASSESSMENT — ENCOUNTER SYMPTOMS
SHORTNESS OF BREATH: 0
EYE ITCHING: 0
VOMITING: 0
WHEEZING: 0
ABDOMINAL DISTENTION: 0
COUGH: 0
EYE PAIN: 0
TROUBLE SWALLOWING: 0
EYE REDNESS: 0
DIARRHEA: 0
EYE DISCHARGE: 0
SORE THROAT: 0
BACK PAIN: 1
ABDOMINAL PAIN: 0
SINUS PAIN: 0
NAUSEA: 0
SINUS PRESSURE: 0
CONSTIPATION: 0

## 2019-07-29 NOTE — PROGRESS NOTES
Arthritis Mother     Dementia Mother     Depression Mother     Diabetes Father     Arthritis Father     Heart Disease Father        Past Surgical History:   Procedure Laterality Date    JOINT REPLACEMENT      left quadracep tendon rupture repair w/ anchors        Past Medical History:   Diagnosis Date    Gynecomastia     History of hypokalemia     Hyperaldosteronism, unspecified ([facility] [address]     Hypertension     Hypogonadism male     Hypokalemia     Hypothyroidism     Osteoarthritis     Type II or unspecified type diabetes mellitus without mention of complication, not stated as uncontrolled        Social History     Socioeconomic History    Marital status: Single     Spouse name: Not on file    Number of children: Not on file    Years of education: Not on file    Highest education level: Not on file   Occupational History    Occupation: resp.  therapist   Social Needs    Financial resource strain: Not on file    Food insecurity:     Worry: Not on file     Inability: Not on file    Transportation needs:     Medical: Not on file     Non-medical: Not on file   Tobacco Use    Smoking status: Never Smoker    Smokeless tobacco: Never Used   Substance and Sexual Activity    Alcohol use: No     Alcohol/week: 0.0 standard drinks    Drug use: No    Sexual activity: Not on file     Comment: single   Lifestyle    Physical activity:     Days per week: Not on file     Minutes per session: Not on file    Stress: Not on file   Relationships    Social connections:     Talks on phone: Not on file     Gets together: Not on file     Attends Rastafari service: Not on file     Active member of club or organization: Not on file     Attends meetings of clubs or organizations: Not on file     Relationship status: Not on file    Intimate partner violence:     Fear of current or ex partner: Not on file     Emotionally abused: Not on file     Physically abused: Not on file     Forced sexual activity: Not on file   Other

## 2019-08-15 ENCOUNTER — PROCEDURE VISIT (OUTPATIENT)
Dept: INTERVENTIONAL RADIOLOGY/VASCULAR | Age: 59
End: 2019-08-15
Payer: COMMERCIAL

## 2019-08-15 VITALS
DIASTOLIC BLOOD PRESSURE: 84 MMHG | BODY MASS INDEX: 33.09 KG/M2 | HEART RATE: 60 BPM | RESPIRATION RATE: 16 BRPM | WEIGHT: 244 LBS | SYSTOLIC BLOOD PRESSURE: 139 MMHG

## 2019-08-15 DIAGNOSIS — I83.813 VARICOSE VEINS OF BILATERAL LOWER EXTREMITIES WITH PAIN: Primary | ICD-10-CM

## 2019-08-15 PROCEDURE — 37799 UNLISTED PX VASCULAR SURGERY: CPT | Performed by: RADIOLOGY

## 2019-08-26 ENCOUNTER — OFFICE VISIT (OUTPATIENT)
Dept: INTERVENTIONAL RADIOLOGY/VASCULAR | Age: 59
End: 2019-08-26
Payer: COMMERCIAL

## 2019-08-26 VITALS
OXYGEN SATURATION: 100 % | DIASTOLIC BLOOD PRESSURE: 80 MMHG | WEIGHT: 251 LBS | BODY MASS INDEX: 34.04 KG/M2 | SYSTOLIC BLOOD PRESSURE: 132 MMHG | HEART RATE: 55 BPM

## 2019-08-26 DIAGNOSIS — I87.2 EDEMA OF BOTH LOWER EXTREMITIES DUE TO PERIPHERAL VENOUS INSUFFICIENCY: ICD-10-CM

## 2019-08-26 DIAGNOSIS — E11.65 UNCONTROLLED TYPE 2 DIABETES MELLITUS WITH HYPERGLYCEMIA (HCC): ICD-10-CM

## 2019-08-26 DIAGNOSIS — I83.813 VARICOSE VEINS OF BILATERAL LOWER EXTREMITIES WITH PAIN: Primary | ICD-10-CM

## 2019-08-26 DIAGNOSIS — E29.1 HYPOGONADISM MALE: ICD-10-CM

## 2019-08-26 DIAGNOSIS — E03.9 HYPOTHYROIDISM, UNSPECIFIED TYPE: ICD-10-CM

## 2019-08-26 LAB
ANION GAP SERPL CALCULATED.3IONS-SCNC: 13 MEQ/L (ref 9–15)
BUN BLDV-MCNC: 12 MG/DL (ref 6–20)
CALCIUM SERPL-MCNC: 9.5 MG/DL (ref 8.5–9.9)
CHLORIDE BLD-SCNC: 100 MEQ/L (ref 95–107)
CO2: 33 MEQ/L (ref 20–31)
CREAT SERPL-MCNC: 1.02 MG/DL (ref 0.7–1.2)
CREATININE URINE: 29 MG/DL
GFR AFRICAN AMERICAN: >60
GFR NON-AFRICAN AMERICAN: >60
GLUCOSE BLD-MCNC: 107 MG/DL (ref 70–99)
HBA1C MFR BLD: 6.3 % (ref 4.8–5.9)
MICROALBUMIN UR-MCNC: <1.2 MG/DL
MICROALBUMIN/CREAT UR-RTO: NORMAL MG/G (ref 0–30)
POTASSIUM SERPL-SCNC: 3.2 MEQ/L (ref 3.4–4.9)
SODIUM BLD-SCNC: 146 MEQ/L (ref 135–144)
T4 FREE: 1.35 NG/DL (ref 0.84–1.68)
TSH SERPL DL<=0.05 MIU/L-ACNC: 2.17 UIU/ML (ref 0.44–3.86)

## 2019-08-26 PROCEDURE — 99213 OFFICE O/P EST LOW 20 MIN: CPT | Performed by: NURSE PRACTITIONER

## 2019-08-26 ASSESSMENT — ENCOUNTER SYMPTOMS
SORE THROAT: 0
ABDOMINAL PAIN: 0
ABDOMINAL DISTENTION: 0
CONSTIPATION: 0
NAUSEA: 0
EYE REDNESS: 0
SHORTNESS OF BREATH: 0
EYE DISCHARGE: 0
DIARRHEA: 0
SINUS PAIN: 0
EYE ITCHING: 0
WHEEZING: 0
VOMITING: 0
SINUS PRESSURE: 0
BACK PAIN: 1
COUGH: 0
TROUBLE SWALLOWING: 0
EYE PAIN: 0

## 2019-08-27 LAB
SEX HORMONE BINDING GLOBULIN: 110 NMOL/L (ref 11–80)
TESTOSTERONE FREE PERCENT: 0.9 % (ref 1.6–2.9)
TESTOSTERONE FREE, CALC: 78 PG/ML (ref 47–244)
TESTOSTERONE TOTAL-MALE: 891 NG/DL (ref 300–890)

## 2019-09-06 ENCOUNTER — OFFICE VISIT (OUTPATIENT)
Dept: ENDOCRINOLOGY | Age: 59
End: 2019-09-06
Payer: COMMERCIAL

## 2019-09-06 VITALS
SYSTOLIC BLOOD PRESSURE: 145 MMHG | HEART RATE: 52 BPM | OXYGEN SATURATION: 98 % | WEIGHT: 250 LBS | HEIGHT: 72 IN | DIASTOLIC BLOOD PRESSURE: 91 MMHG | BODY MASS INDEX: 33.86 KG/M2

## 2019-09-06 DIAGNOSIS — E29.1 HYPOGONADISM MALE: ICD-10-CM

## 2019-09-06 DIAGNOSIS — B35.1 ONYCHOMYCOSIS: ICD-10-CM

## 2019-09-06 DIAGNOSIS — E11.9 TYPE 2 DIABETES MELLITUS WITHOUT COMPLICATION, WITHOUT LONG-TERM CURRENT USE OF INSULIN (HCC): ICD-10-CM

## 2019-09-06 DIAGNOSIS — E11.65 UNCONTROLLED TYPE 2 DIABETES MELLITUS WITH HYPERGLYCEMIA (HCC): Primary | ICD-10-CM

## 2019-09-06 DIAGNOSIS — I10 ESSENTIAL HYPERTENSION: ICD-10-CM

## 2019-09-06 DIAGNOSIS — N62 GYNECOMASTIA: ICD-10-CM

## 2019-09-06 DIAGNOSIS — E03.9 HYPOTHYROIDISM, UNSPECIFIED TYPE: ICD-10-CM

## 2019-09-06 LAB
CHP ED QC CHECK: ABNORMAL
GLUCOSE BLD-MCNC: 127 MG/DL

## 2019-09-06 PROCEDURE — 82962 GLUCOSE BLOOD TEST: CPT | Performed by: INTERNAL MEDICINE

## 2019-09-06 PROCEDURE — 99213 OFFICE O/P EST LOW 20 MIN: CPT | Performed by: INTERNAL MEDICINE

## 2019-09-06 RX ORDER — AMLODIPINE BESYLATE 5 MG/1
5 TABLET ORAL DAILY
Qty: 90 TABLET | Refills: 3 | Status: SHIPPED | OUTPATIENT
Start: 2019-09-06 | End: 2020-09-11 | Stop reason: SDUPTHER

## 2019-09-06 RX ORDER — LEVOTHYROXINE SODIUM 0.12 MG/1
125 TABLET ORAL DAILY
Qty: 90 TABLET | Refills: 3 | Status: SHIPPED | OUTPATIENT
Start: 2019-09-06 | End: 2020-09-11 | Stop reason: SDUPTHER

## 2019-09-06 RX ORDER — EPLERENONE 50 MG/1
TABLET, FILM COATED ORAL
Qty: 180 TABLET | Refills: 3 | Status: SHIPPED | OUTPATIENT
Start: 2019-09-06 | End: 2020-02-08 | Stop reason: SDUPTHER

## 2019-09-06 RX ORDER — POTASSIUM CHLORIDE 750 MG/1
10 TABLET, FILM COATED, EXTENDED RELEASE ORAL DAILY
Qty: 90 TABLET | Refills: 3 | Status: SHIPPED | OUTPATIENT
Start: 2019-09-06 | End: 2020-09-11 | Stop reason: SDUPTHER

## 2019-09-06 RX ORDER — ATENOLOL AND CHLORTHALIDONE TABLET 100; 25 MG/1; MG/1
1 TABLET ORAL DAILY
Qty: 90 TABLET | Refills: 3 | Status: SHIPPED | OUTPATIENT
Start: 2019-09-06 | End: 2020-08-04 | Stop reason: SDUPTHER

## 2019-09-06 RX ORDER — TELMISARTAN 80 MG/1
80 TABLET ORAL DAILY
Qty: 90 TABLET | Refills: 3 | Status: SHIPPED | OUTPATIENT
Start: 2019-09-06 | End: 2020-07-31

## 2019-09-06 RX ORDER — TAMOXIFEN CITRATE 10 MG/1
TABLET ORAL
Qty: 180 TABLET | Refills: 3 | Status: SHIPPED | OUTPATIENT
Start: 2019-09-06 | End: 2020-03-02

## 2019-09-11 DIAGNOSIS — I83.813 VARICOSE VEINS OF BILATERAL LOWER EXTREMITIES WITH PAIN: ICD-10-CM

## 2019-09-11 DIAGNOSIS — I87.2 EDEMA OF BOTH LOWER EXTREMITIES DUE TO PERIPHERAL VENOUS INSUFFICIENCY: Primary | ICD-10-CM

## 2019-09-12 ENCOUNTER — PROCEDURE VISIT (OUTPATIENT)
Dept: INTERVENTIONAL RADIOLOGY/VASCULAR | Age: 59
End: 2019-09-12

## 2019-09-12 VITALS
DIASTOLIC BLOOD PRESSURE: 78 MMHG | HEART RATE: 84 BPM | RESPIRATION RATE: 14 BRPM | SYSTOLIC BLOOD PRESSURE: 122 MMHG | OXYGEN SATURATION: 98 %

## 2019-09-12 DIAGNOSIS — I87.2 VENOUS INSUFFICIENCY: Primary | ICD-10-CM

## 2019-09-12 DIAGNOSIS — I87.2 EDEMA OF BOTH LOWER EXTREMITIES DUE TO PERIPHERAL VENOUS INSUFFICIENCY: ICD-10-CM

## 2019-09-12 DIAGNOSIS — I83.813 VARICOSE VEINS OF BILATERAL LOWER EXTREMITIES WITH PAIN: ICD-10-CM

## 2019-09-12 PROCEDURE — 99999 PR OFFICE/OUTPT VISIT,PROCEDURE ONLY: CPT | Performed by: RADIOLOGY

## 2019-09-12 NOTE — PROGRESS NOTES
IMPRESSION:   Technically successful right lower extremity sclerotherapy on distal right great saphenous vein. HISTORY: Patient with pain in the right lower extremity secondary to venous insufficiency. DIAGNOSIS: Venous insufficiency    COMMENTS: Patient previously had proximal great saphenous vein radiofrequency ablation      PROCEDURE:  Following the discussion of the procedure, alternatives, risks versus benefits, informed consent was obtained from the patient. Specifically, risks of sclerotherapy pain at the site, rare possibility of excessive hemorrhage, infection, injury to the adjacent blood vessels or nerves were discussed and the patient verbalized understanding. Following universal protocol, patient and site verification was performed with a \"timeout\" prior to the procedure. The patient was placed supine with the right lower extremity rotated externally. The right lower extremity was prepped and draped in normal sterile fashion. Using a 21-gauge needle, and 2 mL of Asclera were mixed with 2 mL of air, a a foam mixture was made, which was injected under direct ultrasound guidance into the right great vein . Air artifact was seen with ultrasound imaging throughout the injected vein, confirming successful injection. Following that the right lower extremity was bandaged, and compression stockings were worn. The patient was observed in the recovery room and ambulated for 30 minutes after the procedure and discharged in stable condition.

## 2019-09-25 DIAGNOSIS — I87.2 EDEMA OF BOTH LOWER EXTREMITIES DUE TO PERIPHERAL VENOUS INSUFFICIENCY: Primary | ICD-10-CM

## 2019-09-25 DIAGNOSIS — I83.813 VARICOSE VEINS OF BILATERAL LOWER EXTREMITIES WITH PAIN: ICD-10-CM

## 2019-09-26 ENCOUNTER — OFFICE VISIT (OUTPATIENT)
Dept: INTERVENTIONAL RADIOLOGY/VASCULAR | Age: 59
End: 2019-09-26
Payer: COMMERCIAL

## 2019-09-26 VITALS
RESPIRATION RATE: 14 BRPM | WEIGHT: 245.6 LBS | DIASTOLIC BLOOD PRESSURE: 80 MMHG | BODY MASS INDEX: 33.31 KG/M2 | OXYGEN SATURATION: 98 % | HEART RATE: 55 BPM | SYSTOLIC BLOOD PRESSURE: 122 MMHG

## 2019-09-26 DIAGNOSIS — I87.2 EDEMA OF BOTH LOWER EXTREMITIES DUE TO PERIPHERAL VENOUS INSUFFICIENCY: Primary | ICD-10-CM

## 2019-09-26 DIAGNOSIS — I83.813 VARICOSE VEINS OF BILATERAL LOWER EXTREMITIES WITH PAIN: ICD-10-CM

## 2019-09-26 PROCEDURE — 99213 OFFICE O/P EST LOW 20 MIN: CPT | Performed by: NURSE PRACTITIONER

## 2019-09-26 ASSESSMENT — ENCOUNTER SYMPTOMS
TROUBLE SWALLOWING: 0
SINUS PAIN: 0
EYE DISCHARGE: 0
ABDOMINAL PAIN: 0
VOMITING: 0
EYE ITCHING: 0
NAUSEA: 0
COUGH: 0
SORE THROAT: 0
EYE REDNESS: 0
CONSTIPATION: 0
SINUS PRESSURE: 0
DIARRHEA: 0
SHORTNESS OF BREATH: 0
BACK PAIN: 1
WHEEZING: 0
ABDOMINAL DISTENTION: 0
EYE PAIN: 0

## 2019-09-26 NOTE — PROGRESS NOTES
relieve symptoms however they no longer provide relief. Claudication: Denies. Family History   Problem Relation Age of Onset    Heart Disease Mother     Arthritis Mother     Dementia Mother     Depression Mother     Diabetes Father     Arthritis Father     Heart Disease Father        Past Surgical History:   Procedure Laterality Date    JOINT REPLACEMENT      left quadracep tendon rupture repair w/ anchors        Past Medical History:   Diagnosis Date    Gynecomastia     History of hypokalemia     Hyperaldosteronism, unspecified ([address]     Hypertension     Hypogonadism male     Hypokalemia     Hypothyroidism     Osteoarthritis     Type II or unspecified type diabetes mellitus without mention of complication, not stated as uncontrolled        Social History     Socioeconomic History    Marital status: Single     Spouse name: None    Number of children: None    Years of education: None    Highest education level: None   Occupational History    Occupation: resp.  therapist   Social Needs    Financial resource strain: None    Food insecurity:     Worry: None     Inability: None    Transportation needs:     Medical: None     Non-medical: None   Tobacco Use    Smoking status: Never Smoker    Smokeless tobacco: Never Used   Substance and Sexual Activity    Alcohol use: No     Alcohol/week: 0.0 standard drinks    Drug use: No    Sexual activity: None     Comment: single   Lifestyle    Physical activity:     Days per week: None     Minutes per session: None    Stress: None   Relationships    Social connections:     Talks on phone: None     Gets together: None     Attends Christianity service: None     Active member of club or organization: None     Attends meetings of clubs or organizations: None     Relationship status: None    Intimate partner violence:     Fear of current or ex partner: None     Emotionally abused: None     Physically abused: None     Forced sexual activity: None   Other Distal GSV sclerotherapy    Anastasiya Pantoja, APRN - CNP

## 2019-11-21 ENCOUNTER — PROCEDURE VISIT (OUTPATIENT)
Dept: INTERVENTIONAL RADIOLOGY/VASCULAR | Age: 59
End: 2019-11-21

## 2019-11-21 VITALS
SYSTOLIC BLOOD PRESSURE: 135 MMHG | HEART RATE: 85 BPM | BODY MASS INDEX: 33.23 KG/M2 | DIASTOLIC BLOOD PRESSURE: 82 MMHG | RESPIRATION RATE: 16 BRPM | OXYGEN SATURATION: 98 % | WEIGHT: 245 LBS

## 2019-11-21 DIAGNOSIS — I87.2 EDEMA OF BOTH LOWER EXTREMITIES DUE TO PERIPHERAL VENOUS INSUFFICIENCY: ICD-10-CM

## 2019-11-21 DIAGNOSIS — I83.813 VARICOSE VEINS OF BILATERAL LOWER EXTREMITIES WITH PAIN: ICD-10-CM

## 2019-11-21 DIAGNOSIS — I87.2 VENOUS INSUFFICIENCY: Primary | ICD-10-CM

## 2019-12-04 DIAGNOSIS — M79.669 PAIN AND SWELLING OF LOWER LEG, UNSPECIFIED LATERALITY: ICD-10-CM

## 2019-12-04 DIAGNOSIS — I87.2 EDEMA OF BOTH LOWER EXTREMITIES DUE TO PERIPHERAL VENOUS INSUFFICIENCY: Primary | ICD-10-CM

## 2019-12-04 DIAGNOSIS — I83.813 VARICOSE VEINS OF BILATERAL LOWER EXTREMITIES WITH PAIN: ICD-10-CM

## 2019-12-04 DIAGNOSIS — M79.89 PAIN AND SWELLING OF LOWER LEG, UNSPECIFIED LATERALITY: ICD-10-CM

## 2019-12-05 ENCOUNTER — OFFICE VISIT (OUTPATIENT)
Dept: INTERVENTIONAL RADIOLOGY/VASCULAR | Age: 59
End: 2019-12-05
Payer: COMMERCIAL

## 2019-12-05 VITALS
DIASTOLIC BLOOD PRESSURE: 84 MMHG | HEART RATE: 84 BPM | WEIGHT: 243 LBS | RESPIRATION RATE: 14 BRPM | BODY MASS INDEX: 32.96 KG/M2 | SYSTOLIC BLOOD PRESSURE: 140 MMHG

## 2019-12-05 DIAGNOSIS — I87.2 EDEMA OF BOTH LOWER EXTREMITIES DUE TO PERIPHERAL VENOUS INSUFFICIENCY: Primary | ICD-10-CM

## 2019-12-05 DIAGNOSIS — I83.813 VARICOSE VEINS OF BILATERAL LOWER EXTREMITIES WITH PAIN: ICD-10-CM

## 2019-12-05 PROCEDURE — 99213 OFFICE O/P EST LOW 20 MIN: CPT | Performed by: NURSE PRACTITIONER

## 2019-12-05 ASSESSMENT — ENCOUNTER SYMPTOMS
NAUSEA: 0
COUGH: 0
SINUS PRESSURE: 0
SORE THROAT: 0
EYE ITCHING: 0
EYE DISCHARGE: 0
SHORTNESS OF BREATH: 0
SINUS PAIN: 0
BACK PAIN: 1
ABDOMINAL DISTENTION: 0
DIARRHEA: 0
CONSTIPATION: 0
ABDOMINAL PAIN: 0
VOMITING: 0
TROUBLE SWALLOWING: 0
WHEEZING: 0
EYE REDNESS: 0
EYE PAIN: 0

## 2019-12-17 DIAGNOSIS — I83.813 VARICOSE VEINS OF BILATERAL LOWER EXTREMITIES WITH PAIN: ICD-10-CM

## 2019-12-17 DIAGNOSIS — I87.2 EDEMA OF BOTH LOWER EXTREMITIES DUE TO PERIPHERAL VENOUS INSUFFICIENCY: Primary | ICD-10-CM

## 2019-12-19 ENCOUNTER — PROCEDURE VISIT (OUTPATIENT)
Dept: INTERVENTIONAL RADIOLOGY/VASCULAR | Age: 59
End: 2019-12-19

## 2019-12-19 VITALS
RESPIRATION RATE: 14 BRPM | OXYGEN SATURATION: 99 % | HEART RATE: 55 BPM | SYSTOLIC BLOOD PRESSURE: 143 MMHG | DIASTOLIC BLOOD PRESSURE: 91 MMHG

## 2019-12-19 DIAGNOSIS — I87.2 VENOUS INSUFFICIENCY: Primary | ICD-10-CM

## 2019-12-19 DIAGNOSIS — I87.2 EDEMA OF BOTH LOWER EXTREMITIES DUE TO PERIPHERAL VENOUS INSUFFICIENCY: ICD-10-CM

## 2019-12-19 DIAGNOSIS — I83.813 VARICOSE VEINS OF BILATERAL LOWER EXTREMITIES WITH PAIN: ICD-10-CM

## 2019-12-31 DIAGNOSIS — I83.813 VARICOSE VEINS OF BILATERAL LOWER EXTREMITIES WITH PAIN: ICD-10-CM

## 2019-12-31 DIAGNOSIS — I87.2 EDEMA OF BOTH LOWER EXTREMITIES DUE TO PERIPHERAL VENOUS INSUFFICIENCY: Primary | ICD-10-CM

## 2020-01-02 ENCOUNTER — OFFICE VISIT (OUTPATIENT)
Dept: INTERVENTIONAL RADIOLOGY/VASCULAR | Age: 60
End: 2020-01-02
Payer: COMMERCIAL

## 2020-01-02 VITALS
BODY MASS INDEX: 32.39 KG/M2 | SYSTOLIC BLOOD PRESSURE: 139 MMHG | OXYGEN SATURATION: 99 % | DIASTOLIC BLOOD PRESSURE: 87 MMHG | RESPIRATION RATE: 14 BRPM | HEART RATE: 64 BPM | WEIGHT: 238.8 LBS

## 2020-01-02 PROCEDURE — 99213 OFFICE O/P EST LOW 20 MIN: CPT | Performed by: NURSE PRACTITIONER

## 2020-01-02 ASSESSMENT — ENCOUNTER SYMPTOMS
EYE DISCHARGE: 0
ABDOMINAL DISTENTION: 0
COUGH: 0
SINUS PAIN: 0
SINUS PRESSURE: 0
ABDOMINAL PAIN: 0
EYE PAIN: 0
DIARRHEA: 0
NAUSEA: 0
SHORTNESS OF BREATH: 0
BACK PAIN: 1
SORE THROAT: 0
EYE REDNESS: 0
TROUBLE SWALLOWING: 0
WHEEZING: 0
CONSTIPATION: 0
VOMITING: 0
EYE ITCHING: 0

## 2020-01-02 NOTE — PROGRESS NOTES
Crow Aburto, a male of 61 y.o. came to the office 1/2/2020. Chief Complaint   Patient presents with    Follow Up After Procedure     Pt here for follow up s/p Lt GSV Sclerotherapy       SUBJECTIVE:   1/2/2020: Mr. Aguilar Hernadez here for office visit follow up S/P 2 week Lt GSV distal sclerotherapy with US in office showing successful closure of GSV from insertion at distal medial calf to proximal calf. Entire Left GSV is now completely occluded. Insertion site at distal medial calf healed without complications. There is tenderness with palpation to medial mid calf. He is pleased with results. At this time he states there are no troublesome varicosities to either leg. 5/6/2019: Initially presented to clinic as referral for consultation from PCP for evaluation of both legs with varicose veins that are ropey, prominent swelling and pain. RLE > LLE. Going on for at 2-3 years with progression in symptoms. H/O Right GSV thrombophlebitis with last occurrence on 8/2018. No DVT's noted. Both legs with chronic swelling, aching, pain, throbbing, fatigue, cramping, heaviness going on for several years with progression in symptoms. Patient presents with symptoms of: He has learned to live through pain however pain is now making ADL's more difficult to perform. He exercises at least 4/week. He manages his weight and diet. Intentional weight loss of 20lb/4 months with dieting. 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 NSAIDS: Does not take any pain medication as he's learned to live with pain. Leg elevation: Elevates daily with moderate relief. Stocking use and dates: Has been wearing daily compression for over a year without relief. Compression used to relieve symptoms however they no longer provide relief. Claudication: Denies.      Family History   Problem Relation Age of Onset    Heart Disease Mother     Arthritis Mother     Dementia Mother     Depression Mother     Diabetes Father     Arthritis Father     Heart Disease Father        Past Surgical History:   Procedure Laterality Date    JOINT REPLACEMENT      left quadracep tendon rupture repair w/ anchors        Past Medical History:   Diagnosis Date    Gynecomastia     History of hypokalemia     Hyperaldosteronism, unspecified ([address]     Hypertension     Hypogonadism male     Hypokalemia     Hypothyroidism     Osteoarthritis     Type II or unspecified type diabetes mellitus without mention of complication, not stated as uncontrolled        Social History     Socioeconomic History    Marital status: Single     Spouse name: None    Number of children: None    Years of education: None    Highest education level: None   Occupational History    Occupation: resp.  therapist   Social Needs    Financial resource strain: None    Food insecurity:     Worry: None     Inability: None    Transportation needs:     Medical: None     Non-medical: None   Tobacco Use    Smoking status: Never Smoker    Smokeless tobacco: Never Used   Substance and Sexual Activity    Alcohol use: No     Alcohol/week: 0.0 standard drinks    Drug use: No    Sexual activity: None     Comment: single   Lifestyle    Physical activity:     Days per week: None     Minutes per session: None    Stress: None   Relationships    Social connections:     Talks on phone: None     Gets together: None     Attends Yazidism service: None     Active member of club or organization: None     Attends meetings of clubs or organizations: None     Relationship status: None    Intimate partner violence:     Fear of current or ex partner: None     Emotionally abused: None     Physically abused: None     Forced sexual activity: None   Other Topics Concern    None   Social History Narrative    None       Allergies   Allergen Reactions    Morphine     Invokana [Canagliflozin]      UTI and yeast infection     Shellfish-Derived Products Swelling       Current Outpatient Medications on File Prior to Visit   Medication Sig Dispense Refill    eplerenone (INSPRA) 50 MG tablet TAKE 1 TABLET BY MOUTH 2 TIMES A  tablet 3    atenolol-chlorthalidone (TENORETIC) 100-25 MG per tablet Take 1 tablet by mouth daily 90 tablet 3    potassium chloride (KLOR-CON) 10 MEQ extended release tablet Take 1 tablet by mouth daily 90 tablet 3    sitaGLIPtan-metformin (JANUMET)  MG per tablet Take 1 tablet by mouth 2 times daily (with meals) 180 tablet 1    tamoxifen (NOLVADEX) 10 MG tablet Take 1 tablet by mouth  twice a day 180 tablet 3    levothyroxine (SYNTHROID) 125 MCG tablet Take 1 tablet by mouth daily 90 tablet 03    amLODIPine (NORVASC) 5 MG tablet Take 1 tablet by mouth daily 90 tablet 3    telmisartan (MICARDIS) 80 MG tablet Take 1 tablet by mouth daily 90 tablet 3    Compression Stockings MISC by Does not apply route Thigh- high 30-40mmhg  Dx 187.8; 183.31 1 each 0    zoster recombinant adjuvanted vaccine (SHINGRIX) 50 MCG/0.5ML SUSR injection Inject 0.5 mLs into the muscle See Admin Instructions 1 dose now and repeat in 2-6 months 1 each 0    diclofenac 2 % SOLN Apply one application twice daily to left heel pain. 1 Bottle 2    Blood Glucose Monitoring Suppl (GINGER LAWS) w/Device KIT Please give 1 meter dx e11.65 1 kit 0    SAFETY LANCETS MISC Safety pro lancets, use one daily 100 each 3    glucose blood VI test strips (FREESTYLE INSULINX TEST) strip Test once daily 100 strip 3     No current facility-administered medications on file prior to visit. Review of Systems   Constitutional: Negative for activity change, appetite change, chills, fatigue and fever. HENT: Negative for congestion, ear pain, nosebleeds, sinus pressure, sinus pain, sore throat and trouble swallowing. Eyes: Negative for pain, discharge, redness and itching.    Respiratory: Negative for cough, shortness of breath and wheezing. Cardiovascular: Negative for chest pain, palpitations and leg swelling. Gastrointestinal: Negative for abdominal distention, abdominal pain, constipation, diarrhea, nausea and vomiting. Endocrine: Negative for cold intolerance and heat intolerance. Genitourinary: Negative for dysuria, frequency, hematuria and urgency. Musculoskeletal: Positive for back pain (chronic). Negative for arthralgias, neck pain and neck stiffness. Skin: Negative for rash and wound. Neurological: Negative for dizziness, tremors, seizures, weakness, light-headedness and headaches. Hematological: Does not bruise/bleed easily. Psychiatric/Behavioral: The patient is not nervous/anxious. OBJECTIVE:  /87   Pulse 64   Resp 14   Wt 238 lb 12.8 oz (108.3 kg)   SpO2 99%   BMI 32.39 kg/m²     Physical Exam  Constitutional:       Appearance: He is well-developed. HENT:      Head: Normocephalic and atraumatic. Right Ear: External ear normal.      Left Ear: External ear normal.   Eyes:      Conjunctiva/sclera: Conjunctivae normal.      Pupils: Pupils are equal, round, and reactive to light. Neck:      Musculoskeletal: Normal range of motion and neck supple. Thyroid: No thyromegaly. Vascular: No JVD. Trachea: No tracheal deviation. Cardiovascular:      Rate and Rhythm: Normal rate and regular rhythm. Heart sounds: Normal heart sounds. No murmur. Pulmonary:      Effort: Pulmonary effort is normal. No respiratory distress. Breath sounds: Normal breath sounds. No wheezing or rales. Abdominal:      General: Bowel sounds are normal.      Palpations: Abdomen is soft. Musculoskeletal: Normal range of motion. General: Tenderness (left medial mid calf ) present. Skin:     General: Skin is warm and dry. Neurological:      Mental Status: He is alert and oriented to person, place, and time.    Psychiatric:         Behavior: Behavior normal.

## 2020-02-02 RX ORDER — EPLERENONE 50 MG/1
TABLET, FILM COATED ORAL
Qty: 180 TABLET | Refills: 3 | OUTPATIENT
Start: 2020-02-02

## 2020-02-02 NOTE — TELEPHONE ENCOUNTER
Rx requested:  Requested Prescriptions     Pending Prescriptions Disp Refills    eplerenone (INSPRA) 50 MG tablet [Pharmacy Med Name: EPLERENONE 50MG TABS] 180 tablet 3     Sig: TAKE 1 TABLET BY MOUTH 2 TIMES A DAY       Last Office Visit:   4/15/2019      Next Visit Date:  Future Appointments   Date Time Provider Jeremy Edgar   3/6/2020  9:15 AM Larry Canada MD [facility]

## 2020-02-08 RX ORDER — EPLERENONE 50 MG/1
TABLET, FILM COATED ORAL
Qty: 180 TABLET | Refills: 0 | Status: SHIPPED | OUTPATIENT
Start: 2020-02-08 | End: 2020-09-11 | Stop reason: SDUPTHER

## 2020-02-20 DIAGNOSIS — E11.65 UNCONTROLLED TYPE 2 DIABETES MELLITUS WITH HYPERGLYCEMIA (HCC): ICD-10-CM

## 2020-02-20 DIAGNOSIS — E29.1 HYPOGONADISM MALE: ICD-10-CM

## 2020-02-20 DIAGNOSIS — E03.9 HYPOTHYROIDISM, UNSPECIFIED TYPE: ICD-10-CM

## 2020-02-20 LAB
ANION GAP SERPL CALCULATED.3IONS-SCNC: 13 MEQ/L (ref 9–15)
BUN BLDV-MCNC: 14 MG/DL (ref 6–20)
CALCIUM SERPL-MCNC: 9.2 MG/DL (ref 8.5–9.9)
CHLORIDE BLD-SCNC: 100 MEQ/L (ref 95–107)
CO2: 30 MEQ/L (ref 20–31)
CREAT SERPL-MCNC: 1.08 MG/DL (ref 0.7–1.2)
GFR AFRICAN AMERICAN: >60
GFR NON-AFRICAN AMERICAN: >60
GLUCOSE BLD-MCNC: 91 MG/DL (ref 70–99)
HBA1C MFR BLD: 6.2 % (ref 4.8–5.9)
HCT VFR BLD CALC: 44.6 % (ref 42–52)
HEMOGLOBIN: 14.9 G/DL (ref 14–18)
MCH RBC QN AUTO: 26.9 PG (ref 27–31.3)
MCHC RBC AUTO-ENTMCNC: 33.4 % (ref 33–37)
MCV RBC AUTO: 80.5 FL (ref 80–100)
PDW BLD-RTO: 14.5 % (ref 11.5–14.5)
PLATELET # BLD: 266 K/UL (ref 130–400)
POTASSIUM SERPL-SCNC: 3.7 MEQ/L (ref 3.4–4.9)
RBC # BLD: 5.54 M/UL (ref 4.7–6.1)
SODIUM BLD-SCNC: 143 MEQ/L (ref 135–144)
T4 FREE: 1.25 NG/DL (ref 0.84–1.68)
TSH SERPL DL<=0.05 MIU/L-ACNC: 1.37 UIU/ML (ref 0.44–3.86)
WBC # BLD: 6.2 K/UL (ref 4.8–10.8)

## 2020-02-22 LAB
SEX HORMONE BINDING GLOBULIN: 88 NMOL/L (ref 11–80)
TESTOSTERONE FREE PERCENT: 1.1 % (ref 1.6–2.9)
TESTOSTERONE FREE, CALC: 93 PG/ML (ref 47–244)
TESTOSTERONE TOTAL-MALE: 875 NG/DL (ref 300–890)

## 2020-03-02 RX ORDER — TAMOXIFEN CITRATE 10 MG/1
TABLET ORAL
Qty: 180 TABLET | Refills: 3 | Status: SHIPPED | OUTPATIENT
Start: 2020-03-02 | End: 2020-09-11 | Stop reason: SDUPTHER

## 2020-03-06 ENCOUNTER — OFFICE VISIT (OUTPATIENT)
Dept: ENDOCRINOLOGY | Age: 60
End: 2020-03-06
Payer: COMMERCIAL

## 2020-03-06 VITALS
HEART RATE: 54 BPM | DIASTOLIC BLOOD PRESSURE: 90 MMHG | BODY MASS INDEX: 32.23 KG/M2 | SYSTOLIC BLOOD PRESSURE: 152 MMHG | WEIGHT: 238 LBS | HEIGHT: 72 IN

## 2020-03-06 LAB
CHP ED QC CHECK: NORMAL
GLUCOSE BLD-MCNC: 141 MG/DL

## 2020-03-06 PROCEDURE — 99213 OFFICE O/P EST LOW 20 MIN: CPT | Performed by: INTERNAL MEDICINE

## 2020-03-06 PROCEDURE — 82962 GLUCOSE BLOOD TEST: CPT | Performed by: INTERNAL MEDICINE

## 2020-03-09 NOTE — PROGRESS NOTES
Subjective:      Patient ID: Lori Carrington is a 61 y.o. male. 6 -month follow-up on diabetes hypogonadism hypothyroidism has been losing weight   Diabetes   He presents for his follow-up diabetic visit. He has type 2 diabetes mellitus. Symptoms are stable. Current diabetic treatment includes oral agent (dual therapy) (Janumet). His overall blood glucose range is 110-130 mg/dl. (Lab Results       Component                Value               Date                       LABA1C                   6.2 (H)             02/20/2020            )     Hypogonadism no recent labs     hypoThyroidism on Synthroid 125 mcg daily      Results for Shady Oh (MRN [id number]) as of 3/9/2020 07:18   Ref.  Range 2/20/2020 06:43 3/6/2020 09:36   Sodium Latest Ref Range: 135 - 144 mEq/L 143    Potassium Latest Ref Range: 3.4 - 4.9 mEq/L 3.7    Chloride Latest Ref Range: 95 - 107 mEq/L 100    CO2 Latest Ref Range: 20 - 31 mEq/L 30    BUN Latest Ref Range: 6 - 20 mg/dL 14    Creatinine Latest Ref Range: 0.70 - 1.20 mg/dL 1.08    Anion Gap Latest Ref Range: 9 - 15 mEq/L 13    GFR Non- Latest Ref Range: >60  >60.0    GFR African American Latest Ref Range: >60  >60.0    Glucose Latest Units: mg/dL 91 141   Calcium Latest Ref Range: 8.5 - 9.9 mg/dL 9.2    Hemoglobin A1C Latest Ref Range: 4.8 - 5.9 % 6.2 (H)    Sex Hormone Binding Latest Ref Range: 11 - 80 nmol/L 88 (H)    Testosterone Free, Calc Latest Ref Range: 47 - 244 pg/mL 93    Total Testosterone Latest Ref Range: 300 - 890 ng/dL 875    Testosterone % Free Latest Ref Range: 1.6 - 2.9 % 1.1 (L)    TSH Latest Ref Range: 0.440 - 3.860 uIU/mL 1.370    T4 Free Latest Ref Range: 0.84 - 1.68 ng/dL 1.25    WBC Latest Ref Range: 4.8 - 10.8 K/uL 6.2    RBC Latest Ref Range: 4.70 - 6.10 M/uL 5.54    Hemoglobin Quant Latest Ref Range: 14.0 - 18.0 g/dL 14.9    Hematocrit Latest Ref Range: 42.0 - 52.0 % 44.6    MCV Latest Ref Range: 80.0 - 100.0 fL 80.5    MCH Latest Ref Range: 27.0 - 31.3 pg 26.9 (L)    MCHC Latest Ref Range: 33.0 - 37.0 % 33.4    RDW Latest Ref Range: 11.5 - 14.5 % 14.5    Platelet Count Latest Ref Range: 130 - 400 K/uL 266          Patient Active Problem List   Diagnosis    Hypokalemia    Gynecomastia    Hypogonadism male    History of hypokalemia    [facility]ism ([facility] [address]    Hypertension    Type II diabetes mellitus, uncontrolled ([facility]    Hypothyroidism    Obesity    Edema of both lower extremities due to peripheral venous insufficiency    Varicose veins of bilateral lower extremities with pain     Allergies   Allergen Reactions    Morphine     Invokana [Canagliflozin]      UTI and yeast infection     Shellfish-Derived Products Swelling       Current Outpatient Medications:     tamoxifen (NOLVADEX) 10 MG tablet, TAKE 1 TABLET BY MOUTH 2 TIMES A DAY, Disp: 180 tablet, Rfl: 3    eplerenone (INSPRA) 50 MG tablet, TAKE 1 TABLET BY MOUTH 2 TIMES A DAY, Disp: 180 tablet, Rfl: 0    atenolol-chlorthalidone (TENORETIC) 100-25 MG per tablet, Take 1 tablet by mouth daily, Disp: 90 tablet, Rfl: 3    potassium chloride (KLOR-CON) 10 MEQ extended release tablet, Take 1 tablet by mouth daily, Disp: 90 tablet, Rfl: 3    sitaGLIPtan-metformin (JANUMET)  MG per tablet, Take 1 tablet by mouth 2 times daily (with meals), Disp: 180 tablet, Rfl: 1    levothyroxine (SYNTHROID) 125 MCG tablet, Take 1 tablet by mouth daily, Disp: 90 tablet, Rfl: 03    amLODIPine (NORVASC) 5 MG tablet, Take 1 tablet by mouth daily, Disp: 90 tablet, Rfl: 3    telmisartan (MICARDIS) 80 MG tablet, Take 1 tablet by mouth daily, Disp: 90 tablet, Rfl: 3    Compression Stockings MISC, by Does not apply route Thigh- high 30-40mmhg Dx 187.8; 183.31, Disp: 1 each, Rfl: 0    zoster recombinant adjuvanted vaccine (SHINGRIX) 50 MCG/0.5ML SUSR injection, Inject 0.5 mLs into the muscle See Admin Instructions 1 dose now and repeat in 2-6 months, Disp: 1 each, Rfl: 0    diclofenac 2 % SOLN, Apply one application twice daily to left heel pain., Disp: 1 Bottle, Rfl: 2    Blood Glucose Monitoring Suppl (AGAMATRIX PRESTO) w/Device KIT, Please give 1 meter dx e11.65, Disp: 1 kit, Rfl: 0    SAFETY LANCETS MISC, Safety pro lancets, use one daily, Disp: 100 each, Rfl: 3    glucose blood VI test strips (FREESTYLE INSULINX TEST) strip, Test once daily, Disp: 100 strip, Rfl: 3      Review of Systems  Vitals:    03/06/20 0929   BP: (!) 152/90   Site: Right Upper Arm   Position: Sitting   Cuff Size: Large Adult   Pulse: 54   Weight: 238 lb (108 kg)   Height: 6' (1.829 m)       Objective:   Physical Exam  Constitutional:       Appearance: Normal appearance. He is well-developed. He is obese. HENT:      Head: Normocephalic. Neck:      Musculoskeletal: Normal range of motion and neck supple. Cardiovascular:      Rate and Rhythm: Normal rate. Musculoskeletal: Normal range of motion. Neurological:      Mental Status: He is alert. Assessment:       Diagnosis Orders   1. Uncontrolled type 2 diabetes mellitus with hyperglycemia (HCC)  POCT Glucose    Basic Metabolic Panel    Hemoglobin A1C   2. Hypogonadism male  Basic Metabolic Panel    Testosterone Free and Total Male   3.  Hypothyroidism, unspecified type  Basic Metabolic Panel    TSH without Reflex    T4, Free           Plan:      Orders Placed This Encounter   Procedures    Basic Metabolic Panel     Standing Status:   Future     Standing Expiration Date:   3/6/2021    Hemoglobin A1C     Standing Status:   Future     Standing Expiration Date:   3/6/2021    Testosterone Free and Total Male     Standing Status:   Future     Standing Expiration Date:   3/6/2021    TSH without Reflex     Standing Status:   Future     Standing Expiration Date:   3/6/2021    T4, Free     Standing Status:   Future     Standing Expiration Date:   3/6/2021    POCT Glucose     Continue current medication regimen         Heath Castillo MD

## 2020-04-14 ENCOUNTER — OFFICE VISIT (OUTPATIENT)
Dept: FAMILY MEDICINE CLINIC | Age: 60
End: 2020-04-14
Payer: COMMERCIAL

## 2020-04-14 VITALS
HEIGHT: 72 IN | WEIGHT: 241 LBS | DIASTOLIC BLOOD PRESSURE: 80 MMHG | RESPIRATION RATE: 16 BRPM | SYSTOLIC BLOOD PRESSURE: 130 MMHG | HEART RATE: 60 BPM | BODY MASS INDEX: 32.64 KG/M2 | TEMPERATURE: 97.6 F

## 2020-04-14 PROCEDURE — 99213 OFFICE O/P EST LOW 20 MIN: CPT | Performed by: NURSE PRACTITIONER

## 2020-04-14 RX ORDER — PREDNISONE 10 MG/1
TABLET ORAL
Qty: 18 TABLET | Refills: 0 | Status: SHIPPED | OUTPATIENT
Start: 2020-04-14 | End: 2020-04-23

## 2020-04-14 ASSESSMENT — PATIENT HEALTH QUESTIONNAIRE - PHQ9
1. LITTLE INTEREST OR PLEASURE IN DOING THINGS: 0
2. FEELING DOWN, DEPRESSED OR HOPELESS: 0
SUM OF ALL RESPONSES TO PHQ QUESTIONS 1-9: 0
SUM OF ALL RESPONSES TO PHQ QUESTIONS 1-9: 0
SUM OF ALL RESPONSES TO PHQ9 QUESTIONS 1 & 2: 0

## 2020-04-14 NOTE — PROGRESS NOTES
Subjective  Sammy Shin, 61 y.o. male presents today with:  Chief Complaint   Patient presents with   [facility] Fall     4/13/20 pt fell in his flower bed and injured right knee    Knee Injury     right knee swelling      HPI   Knee Pain  Sj Georges presents to [facility] for evaluation knee pain/ swelling involving the right knee. Onset was within the last 24 hrs. Inciting event: tripped and fell into a flower bed when stepping off his porch at home. Immediate symptoms: small abrasion x 2, able to bear weight directly after injury, no deformity was noted by pt. Current symptoms include: pain located at anterior-superior aspect of the knee and medial joint line, stiffness, and swelling. Pain is aggravated by: any weight bearing, going up and down stairs, rising after sitting, better w/ rest.  Pain quality is characterized as aching vs intermittently sharp in nature. Symptoms have been gradually worsened to a point and plateaued since yesterday. Pt denies numbness or weakness. There was no head impact or other injury. Prior history of related problems: knee arthritis     Eval/ tx to date: avoidance of offending activity, OTC analgesics which are somewhat effective. Patient's medications, allergies, past medical, surgical, social and family histories were reviewed and updated as appropriate. Review of Systems   Constitutional: Negative for chills and fever. Respiratory: Negative for cough. Cardiovascular: Negative for leg swelling. Gastrointestinal: Negative for abdominal pain. Musculoskeletal: Positive for arthralgias and joint swelling. Negative for gait problem, neck pain and neck stiffness. Skin: Positive for wound (superficial abrasion R knee). Negative for color change and rash. Neurological: Negative for dizziness, tremors, syncope, weakness and light-headedness. Hematological: Does not bruise/bleed easily.      Objective  Vitals:    04/14/20 0846   BP: 130/80   Pulse: 60   Resp: 16 Temp: 97.6 °F (36.4 °C)   TempSrc: Oral   Weight: 241 lb (109.3 kg)   Height: 6' (1.829 m)     Physical Exam   Appearance: A&O x 4, well appearing, well-groomed, wearing facial mask over nose/ mouth, in no distress. Right knee: positive exam findings: mild swelling, soft tissue tenderness over medial joint line and suprapatellar area, small superficial abrasion x 2 covered by band-aids, bleeding controlled. negative exam findings: no erythema, no patellar laxity, no crepitus, FROM, normal ipsilateral hip + foot exam.  Left knee:  normal and no effusion, full active range of motion, no joint line tenderness, ligamentous structures intact. Extremities:  without cyanosis, clubbing, or edema. Neurological:  grossly nonfocal. alert and oriented, moving all 4 extremities, observed to ambulate w/ slight antalgic gait. Skin:  warm and dry w/ superficial abrasion x 2 on R knee as above, otherwise no lesions/ rashes. Vital signs reviewed. POC Testing Today: No results found for this visit on 04/14/20. Assessment & Plan   61 y.o. male presenting w/ one day of R knee pain and swelling after a fall at home. Able to flex and extend although somewhat limited by pain. Considered, but doubt, tibial plateau fracture, dislocation, septic arthritis, other acute unstable fracture. Pt well-appearing, non-toxic, VSS. Recommended relative rest for 48 hrs, ice, OTC analgesics. Paper Rx for prednisone taper provided to begin if sx aren't improving in the next 24-72 hrs as anticipated-- instructed to call/ message prior to filling for possible imaging to r/o acute joint pathology. Work note provided.   Diagnoses and all orders for this visit:    Pain and swelling of knee, right  -     predniSONE (DELTASONE) 10 MG tablet; 30 mg 3 days 20 mg 3 days and 10 mg 3 days  - Call/ send [employer] message if sx haven't improved in the next 1-3 days prior to starting prednisone  - Rest injured area as much as practical, apply ice packs, elevate affected limb, compressive bandage/ brace for comfort  - Follow-up w/ PCP prn new or worsening symptoms  - Note for work    Fall at home, initial encounter  - Rest, ice, compression, and elevation (RICE) therapy  - Reduction in offending activity  - Return/ follow-up w/ PCP if symptoms fail to improve as anticipated     Oral Steroid Instructions: Take each dose with a small snack or meal to lessen potential GI upset. Follow dosing instructions provided with prescription. Common side effects include difficulty sleeping and irritability. Take full course as ordered. Oral Steroids and Blood Glucose Education  Discussed side effects of steroids on blood glucose and recommended avoiding concentrated sweets and limiting salt intake while taking steroids    Return if symptoms worsen or fail to improve, for follow-up with your Primary Care Provider. Side effects and adverse effects of any medication prescribed today, as well as treatment plan/rationale, follow-up care, and result expectations have been discussed with the patient. Varsha Theresa expresses understanding and wishes to proceed with the plan. Discussed signs and symptoms which require immediate follow-up in ED/call to 911. Understanding verbalized. I have reviewed and updated the electronic medical record.     Kisha Williamson, APRN - CNP

## 2020-04-14 NOTE — PATIENT INSTRUCTIONS
Rest the injured area as much as practical, apply ice packs, elevate the injured limb, compressive bandage/ brace for comfort  Follow-up w/ PCP/ send [employer] message as needed for new or worsening symptoms  Note for work    Please be aware that musculoskeletal pain commonly worsens a day or two after a fall or injury before it gets better  The injured joint or area should be rested for 2-3 days  Mobilization can be started after 48-72 hours in accordance with your pain level    Make an appointment for follow-up with your primary care provider if your pain is not vastly improved in 5-7 days--> sooner if worsening     General Recommendations   - Begin Rest, Ice, Compression, and Elevation (RICE) as soon as possible  - The affected part should be stabilized, protected, and rested for up to 48 hours after injury, depending on pain  - Do not apply ice/ cold compress directly to skin--> the affected joint should be allowed to warm up before the cold compress is repeated  - May repeat cold compresses as frequently as desired for 48 hours: for example, every 2 hours while awake  - Compression should be applied with care so as not to constrict blood flow--> if numbness, tingling, or pain develops, splint/ compression wrap should be loosened/ removed    Pain Control   -  Primary Options  - ACETAMINOPHEN (aka Tylenol) : 500 mg every 4-6 hours, as needed, for pain, maximum 4000 mg/day   - Secondary Options  - IBUPROFEN (aka MOTRIN, ADVIL) : 400-600 mg orally every 4-6 hours as needed, for pain, maximum 3200 mg/day    - If needed, you can alternate these medications so that you take one medication every 3 hours.  For instance, at noon take ibuprofen, then at 3pm take Tylenol, then at 6pm take ibuprofen  - Please do not take these medications if you have a bleeding disorder, stomach or GI ulcer problems, or liver disease    Call Immediately Go to ER/ 9-1-1  - The skin or nails below affected area turn blue/ gray or feel cold/ Search Health Information box to learn more about \"Knee Pain or Injury: Care Instructions. \"     If you do not have an account, please click on the \"Sign Up Now\" link. Current as of: June 26, 2019Content Version: 12.4  © 3811-3639 [employer], [employer]orporated. Care instructions adapted under license by [facility] ([facility]). If you have questions about a medical condition or this instruction, always ask your healthcare professional. [address] any warranty or liability for your use of this information.

## 2020-04-17 ASSESSMENT — ENCOUNTER SYMPTOMS
COLOR CHANGE: 0
COUGH: 0
ABDOMINAL PAIN: 0

## 2020-05-05 RX ORDER — SITAGLIPTIN AND METFORMIN HYDROCHLORIDE 500; 50 MG/1; MG/1
TABLET, FILM COATED ORAL
Qty: 180 TABLET | Refills: 1 | Status: SHIPPED | OUTPATIENT
Start: 2020-05-05 | End: 2020-09-11 | Stop reason: SDUPTHER

## 2020-07-31 RX ORDER — TELMISARTAN 80 MG/1
TABLET ORAL
Qty: 90 TABLET | Refills: 3 | Status: SHIPPED | OUTPATIENT
Start: 2020-07-31 | End: 2020-09-11 | Stop reason: SDUPTHER

## 2020-08-04 RX ORDER — ATENOLOL AND CHLORTHALIDONE TABLET 100; 25 MG/1; MG/1
1 TABLET ORAL DAILY
Qty: 90 TABLET | Refills: 3 | Status: SHIPPED | OUTPATIENT
Start: 2020-08-04 | End: 2020-09-11 | Stop reason: SDUPTHER

## 2020-08-06 RX ORDER — ATENOLOL AND CHLORTHALIDONE TABLET 100; 25 MG/1; MG/1
TABLET ORAL
Qty: 90 TABLET | Refills: 3 | OUTPATIENT
Start: 2020-08-06

## 2020-09-08 DIAGNOSIS — E29.1 HYPOGONADISM MALE: ICD-10-CM

## 2020-09-08 DIAGNOSIS — E11.65 UNCONTROLLED TYPE 2 DIABETES MELLITUS WITH HYPERGLYCEMIA (HCC): ICD-10-CM

## 2020-09-08 DIAGNOSIS — E03.9 HYPOTHYROIDISM, UNSPECIFIED TYPE: ICD-10-CM

## 2020-09-08 LAB
ANION GAP SERPL CALCULATED.3IONS-SCNC: 14 MEQ/L (ref 9–15)
BUN BLDV-MCNC: 12 MG/DL (ref 6–20)
CALCIUM SERPL-MCNC: 9 MG/DL (ref 8.5–9.9)
CHLORIDE BLD-SCNC: 102 MEQ/L (ref 95–107)
CO2: 25 MEQ/L (ref 20–31)
CREAT SERPL-MCNC: 0.94 MG/DL (ref 0.7–1.2)
GFR AFRICAN AMERICAN: >60
GFR NON-AFRICAN AMERICAN: >60
GLUCOSE BLD-MCNC: 153 MG/DL (ref 70–99)
HBA1C MFR BLD: 6.7 % (ref 4.8–5.9)
POTASSIUM SERPL-SCNC: 3.3 MEQ/L (ref 3.4–4.9)
SODIUM BLD-SCNC: 141 MEQ/L (ref 135–144)
T4 FREE: 1.28 NG/DL (ref 0.84–1.68)
TSH SERPL DL<=0.05 MIU/L-ACNC: 1.44 UIU/ML (ref 0.44–3.86)

## 2020-09-09 LAB
SEX HORMONE BINDING GLOBULIN: 73 NMOL/L (ref 11–80)
TESTOSTERONE FREE PERCENT: 1.3 % (ref 1.6–2.9)
TESTOSTERONE FREE, CALC: 154 PG/ML (ref 47–244)
TESTOSTERONE TOTAL-MALE: 1164 NG/DL (ref 300–890)

## 2020-09-11 ENCOUNTER — OFFICE VISIT (OUTPATIENT)
Dept: ENDOCRINOLOGY | Age: 60
End: 2020-09-11
Payer: COMMERCIAL

## 2020-09-11 VITALS
SYSTOLIC BLOOD PRESSURE: 144 MMHG | WEIGHT: 247 LBS | DIASTOLIC BLOOD PRESSURE: 84 MMHG | OXYGEN SATURATION: 98 % | BODY MASS INDEX: 33.46 KG/M2 | HEIGHT: 72 IN | HEART RATE: 58 BPM

## 2020-09-11 LAB
CHP ED QC CHECK: NORMAL
GLUCOSE BLD-MCNC: 191 MG/DL

## 2020-09-11 PROCEDURE — 99213 OFFICE O/P EST LOW 20 MIN: CPT | Performed by: INTERNAL MEDICINE

## 2020-09-11 PROCEDURE — 82962 GLUCOSE BLOOD TEST: CPT | Performed by: INTERNAL MEDICINE

## 2020-09-11 RX ORDER — AMLODIPINE BESYLATE 5 MG/1
5 TABLET ORAL DAILY
Qty: 90 TABLET | Refills: 3 | Status: SHIPPED | OUTPATIENT
Start: 2020-09-11 | End: 2021-09-09 | Stop reason: SDUPTHER

## 2020-09-11 RX ORDER — LEVOTHYROXINE SODIUM 0.12 MG/1
125 TABLET ORAL DAILY
Qty: 90 TABLET | Refills: 3 | Status: SHIPPED | OUTPATIENT
Start: 2020-09-11 | End: 2021-07-13

## 2020-09-11 RX ORDER — TAMOXIFEN CITRATE 10 MG/1
TABLET ORAL
Qty: 180 TABLET | Refills: 3 | Status: SHIPPED | OUTPATIENT
Start: 2020-09-11 | End: 2021-09-09 | Stop reason: SDUPTHER

## 2020-09-11 RX ORDER — ATENOLOL AND CHLORTHALIDONE TABLET 100; 25 MG/1; MG/1
1 TABLET ORAL DAILY
Qty: 90 TABLET | Refills: 3 | Status: SHIPPED | OUTPATIENT
Start: 2020-09-11 | End: 2021-11-16

## 2020-09-11 RX ORDER — SITAGLIPTIN AND METFORMIN HYDROCHLORIDE 500; 50 MG/1; MG/1
TABLET, FILM COATED ORAL
Qty: 180 TABLET | Refills: 3 | Status: SHIPPED | OUTPATIENT
Start: 2020-09-11 | End: 2021-09-09 | Stop reason: SDUPTHER

## 2020-09-11 RX ORDER — POTASSIUM CHLORIDE 750 MG/1
10 TABLET, FILM COATED, EXTENDED RELEASE ORAL DAILY
Qty: 90 TABLET | Refills: 3 | Status: SHIPPED | OUTPATIENT
Start: 2020-09-11 | End: 2021-09-09 | Stop reason: SDUPTHER

## 2020-09-11 RX ORDER — EPLERENONE 50 MG/1
TABLET, FILM COATED ORAL
Qty: 180 TABLET | Refills: 0 | Status: SHIPPED | OUTPATIENT
Start: 2020-09-11 | End: 2021-01-29

## 2020-09-11 RX ORDER — TELMISARTAN 80 MG/1
TABLET ORAL
Qty: 90 TABLET | Refills: 3 | Status: SHIPPED | OUTPATIENT
Start: 2020-09-11 | End: 2021-09-09 | Stop reason: SDUPTHER

## 2020-09-11 NOTE — PROGRESS NOTES
Subjective:      Patient ID: Miller Guillen is a 61 y.o. male. Follow-up on diabetes hypertension hypothyroidism history of hypogonadism patient not on replacement  Thyroid functions were stable testosterone level on the high side potassium slightly low A1c of 6.7  Diabetes   He presents for his follow-up diabetic visit. He has type 2 diabetes mellitus. Symptoms are stable. Current diabetic treatment includes oral agent (dual therapy). His overall blood glucose range is 130-140 mg/dl. (Lab Results       Component                Value               Date                       LABA1C                   6.7 (H)             09/08/2020            ) An ACE inhibitor/angiotensin II receptor blocker is being taken. Results for Bria Matamoros (MRN [id number]) as of 9/11/2020 09:33   Ref.  Range 3/6/2020 09:36 9/8/2020 14:23 9/11/2020 09:27   Sodium Latest Ref Range: 135 - 144 mEq/L  141    Potassium Latest Ref Range: 3.4 - 4.9 mEq/L  3.3 (L)    Chloride Latest Ref Range: 95 - 107 mEq/L  102    CO2 Latest Ref Range: 20 - 31 mEq/L  25    BUN Latest Ref Range: 6 - 20 mg/dL  12    Creatinine Latest Ref Range: 0.70 - 1.20 mg/dL  0.94    Anion Gap Latest Ref Range: 9 - 15 mEq/L  14    GFR Non- Latest Ref Range: >60   >60.0    GFR African American Latest Ref Range: >60   >60.0    Glucose Latest Units: mg/dL 141 153 (H) 191   Calcium Latest Ref Range: 8.5 - 9.9 mg/dL  9.0    Hemoglobin A1C Latest Ref Range: 4.8 - 5.9 %  6.7 (H)    Sex Hormone Binding Latest Ref Range: 11 - 80 nmol/L  73    Testosterone Free, Calc Latest Ref Range: 47 - 244 pg/mL  154    Total Testosterone Latest Ref Range: 300 - 890 ng/dL  1164 (H)    Testosterone % Free Latest Ref Range: 1.6 - 2.9 %  1.3 (L)    TSH Latest Ref Range: 0.440 - 3.860 uIU/mL  1.440    T4 Free Latest Ref Range: 0.84 - 1.68 ng/dL  1.28        Patient Active Problem List   Diagnosis    Hypokalemia    Gynecomastia    Hypogonadism male    History of hypokalemia    [facility] [address]    Hypertension    [facility]    Hypothyroidism    Obesity    Edema of both lower extremities due to peripheral venous insufficiency    Varicose veins of bilateral lower extremities with pain       Current Outpatient Medications:     atenolol-chlorthalidone (TENORETIC) 100-25 MG per tablet, Take 1 tablet by mouth daily, Disp: 90 tablet, Rfl: 3    telmisartan (MICARDIS) 80 MG tablet, TAKE 1 TABLET BY MOUTH ONE TIME A DAY, Disp: 90 tablet, Rfl: 3    sitaGLIPtan-metformin (JANUMET)  MG per tablet, TAKE 1 TABLET BY MOUTH 2 TIMES A DAY WITH MEALS, Disp: 180 tablet, Rfl: 3    tamoxifen (NOLVADEX) 10 MG tablet, TAKE 1 TABLET BY MOUTH 2 TIMES A DAY, Disp: 180 tablet, Rfl: 3    eplerenone (INSPRA) 50 MG tablet, TAKE 1 TABLET BY MOUTH 2 TIMES A DAY, Disp: 180 tablet, Rfl: 0    potassium chloride (KLOR-CON) 10 MEQ extended release tablet, Take 1 tablet by mouth daily, Disp: 90 tablet, Rfl: 3    levothyroxine (SYNTHROID) 125 MCG tablet, Take 1 tablet by mouth daily, Disp: 90 tablet, Rfl: 03    amLODIPine (NORVASC) 5 MG tablet, Take 1 tablet by mouth daily, Disp: 90 tablet, Rfl: 3    Compression Stockings MISC, by Does not apply route Thigh- high 30-40mmhg Dx 187.8; 183.31, Disp: 1 each, Rfl: 0    diclofenac 2 % SOLN, Apply one application twice daily to left heel pain., Disp: 1 Bottle, Rfl: 2    Blood Glucose Monitoring Suppl (AGAMATRIX PRESTO) w/Device KIT, Please give 1 meter dx e11.65, Disp: 1 kit, Rfl: 0    SAFETY LANCETS MISC, Safety pro lancets, use one daily, Disp: 100 each, Rfl: 3    glucose blood VI test strips (FREESTYLE INSULINX TEST) strip, Test once daily, Disp: 100 strip, Rfl: 3    zoster recombinant adjuvanted vaccine (SHINGRIX) 50 MCG/0.5ML SUSR injection, Inject 0.5 mLs into the muscle See Admin Instructions 1 dose now and repeat in 2-6 months (Patient not taking: Reported on 9/11/2020), Disp: 1 each, Rfl: 0    Review of Systems      Vitals:    09/11/20 0921 09/11/20 0924   BP: (!) 155/78 (!) 144/84   Pulse: 58    SpO2: 98%    Weight: 247 lb (112 kg)    Height: 6' (1.829 m)        Objective:   Physical Exam  Constitutional:       Appearance: Normal appearance. He is obese. HENT:      Head: Normocephalic and atraumatic. Neck:      Musculoskeletal: Normal range of motion and neck supple. Cardiovascular:      Rate and Rhythm: Normal rate. Neurological:      General: No focal deficit present. Mental Status: He is alert. Psychiatric:         Mood and Affect: Mood normal.         Assessment:       Diagnosis Orders   1. Uncontrolled type 2 diabetes mellitus with hyperglycemia (HCC)  POCT Glucose    Basic Metabolic Panel    Hemoglobin A1C   2. Hypothyroidism, unspecified type  T4, Free    TSH with Reflex    CBC    levothyroxine (SYNTHROID) 125 MCG tablet   3.  Essential hypertension  telmisartan (MICARDIS) 80 MG tablet    amLODIPine (NORVASC) 5 MG tablet           Plan:      Orders Placed This Encounter   Procedures    T4, Free     Standing Status:   Future     Standing Expiration Date:   9/11/2021    TSH with Reflex     Standing Status:   Future     Standing Expiration Date:   9/11/2021    Basic Metabolic Panel     Standing Status:   Future     Standing Expiration Date:   9/11/2021    Hemoglobin A1C     Standing Status:   Future     Standing Expiration Date:   9/11/2021    CBC     Standing Status:   Future     Standing Expiration Date:   9/11/2021    POCT Glucose     Orders Placed This Encounter   Medications    atenolol-chlorthalidone (TENORETIC) 100-25 MG per tablet     Sig: Take 1 tablet by mouth daily     Dispense:  90 tablet     Refill:  3    telmisartan (MICARDIS) 80 MG tablet     Sig: TAKE 1 TABLET BY MOUTH ONE TIME A DAY     Dispense:  90 tablet     Refill:  3    sitaGLIPtan-metformin (JANUMET)  MG per tablet     Sig: TAKE 1 TABLET BY MOUTH 2 TIMES A DAY WITH MEALS     Dispense:  180 tablet     Refill: 3    tamoxifen (NOLVADEX) 10 MG tablet     Sig: TAKE 1 TABLET BY MOUTH 2 TIMES A DAY     Dispense:  180 tablet     Refill:  3    eplerenone (INSPRA) 50 MG tablet     Sig: TAKE 1 TABLET BY MOUTH 2 TIMES A DAY     Dispense:  180 tablet     Refill:  0    potassium chloride (KLOR-CON) 10 MEQ extended release tablet     Sig: Take 1 tablet by mouth daily     Dispense:  90 tablet     Refill:  3    levothyroxine (SYNTHROID) 125 MCG tablet     Sig: Take 1 tablet by mouth daily     Dispense:  90 tablet     Refill:  03    amLODIPine (NORVASC) 5 MG tablet     Sig: Take 1 tablet by mouth daily     Dispense:  90 tablet     Refill:  3     Continue Janumet Synthroid    Blood pressure medication regimen follow-up in 3 to 6 months time        Beth Roberts MD

## 2020-11-13 ENCOUNTER — OFFICE VISIT (OUTPATIENT)
Dept: FAMILY MEDICINE CLINIC | Age: 60
End: 2020-11-13
Payer: COMMERCIAL

## 2020-11-13 VITALS
TEMPERATURE: 97.8 F | OXYGEN SATURATION: 97 % | HEART RATE: 70 BPM | DIASTOLIC BLOOD PRESSURE: 84 MMHG | WEIGHT: 240 LBS | RESPIRATION RATE: 15 BRPM | SYSTOLIC BLOOD PRESSURE: 134 MMHG | HEIGHT: 73 IN | BODY MASS INDEX: 31.81 KG/M2

## 2020-11-13 PROCEDURE — 20605 DRAIN/INJ JOINT/BURSA W/O US: CPT | Performed by: INTERNAL MEDICINE

## 2020-11-13 RX ADMIN — METHYLPREDNISOLONE ACETATE 40 MG: 40 INJECTION, SUSPENSION INTRA-ARTICULAR; INTRALESIONAL; INTRAMUSCULAR; SOFT TISSUE at 08:58

## 2020-11-13 ASSESSMENT — ENCOUNTER SYMPTOMS
SHORTNESS OF BREATH: 0
EYE PAIN: 0
BACK PAIN: 0
ABDOMINAL PAIN: 0

## 2020-11-13 NOTE — PROGRESS NOTES
Subjective:      Patient ID: Derik Manley is a 61 y.o. male who presents today with:  Chief Complaint   Patient presents with    Follow-up    Shoulder Pain     left shoulder pain       HPI     Left shoulder pain  At least 3 months  Not at work  [place] had bursitis before and it felt like this  No fevers or chills  Can move it  Requesting steroid shot. Not red, not hot, not swollen. Past Medical History:   Diagnosis Date    Gynecomastia     History of hypokalemia     Hyperaldosteronism, unspecified ([address]     Hypertension     Hypogonadism male     Hypokalemia     Hypothyroidism     Osteoarthritis     Type II or unspecified type diabetes mellitus without mention of complication, not stated as uncontrolled      Past Surgical History:   Procedure Laterality Date    JOINT REPLACEMENT      left quadracep tendon rupture repair w/ anchors     Social History     Socioeconomic History    Marital status: Single     Spouse name: Not on file    Number of children: Not on file    Years of education: Not on file    Highest education level: Not on file   Occupational History    Occupation: resp.  therapist   Social Needs    Financial resource strain: Not on file    Food insecurity     Worry: Not on file     Inability: Not on file    Transportation needs     Medical: Not on file     Non-medical: Not on file   Tobacco Use    Smoking status: Never Smoker    Smokeless tobacco: Never Used   Substance and Sexual Activity    Alcohol use: No     Alcohol/week: 0.0 standard drinks    Drug use: No    Sexual activity: Not on file     Comment: single   Lifestyle    Physical activity     Days per week: Not on file     Minutes per session: Not on file    Stress: Not on file   Relationships    Social connections     Talks on phone: Not on file     Gets together: Not on file     Attends Zoroastrian service: Not on file     Active member of club or organization: Not on file     Attends meetings of clubs or organizations: Not on file     Relationship status: Not on file    Intimate partner violence     Fear of current or ex partner: Not on file     Emotionally abused: Not on file     Physically abused: Not on file     Forced sexual activity: Not on file   Other Topics Concern    Not on file   Social History Narrative    Not on file     Allergies   Allergen Reactions    Morphine     Invokana [Canagliflozin]      UTI and yeast infection     Shellfish-Derived Products Swelling     Current Outpatient Medications on File Prior to Visit   Medication Sig Dispense Refill    atenolol-chlorthalidone (TENORETIC) 100-25 MG per tablet Take 1 tablet by mouth daily 90 tablet 3    telmisartan (MICARDIS) 80 MG tablet TAKE 1 TABLET BY MOUTH ONE TIME A DAY 90 tablet 3    sitaGLIPtan-metformin (JANUMET)  MG per tablet TAKE 1 TABLET BY MOUTH 2 TIMES A DAY WITH MEALS 180 tablet 3    tamoxifen (NOLVADEX) 10 MG tablet TAKE 1 TABLET BY MOUTH 2 TIMES A  tablet 3    eplerenone (INSPRA) 50 MG tablet TAKE 1 TABLET BY MOUTH 2 TIMES A  tablet 0    potassium chloride (KLOR-CON) 10 MEQ extended release tablet Take 1 tablet by mouth daily 90 tablet 3    levothyroxine (SYNTHROID) 125 MCG tablet Take 1 tablet by mouth daily 90 tablet 03    amLODIPine (NORVASC) 5 MG tablet Take 1 tablet by mouth daily 90 tablet 3    Compression Stockings MISC by Does not apply route Thigh- high 30-40mmhg  Dx 187.8; 183.31 1 each 0    Blood Glucose Monitoring Suppl (AGAMATRIX PRESTO) w/Device KIT Please give 1 meter dx e11.65 1 kit 0    SAFETY LANCETS MISC Safety pro lancets, use one daily 100 each 3    glucose blood VI test strips (FREESTYLE INSULINX TEST) strip Test once daily 100 strip 3    zoster recombinant adjuvanted vaccine (SHINGRIX) 50 MCG/0.5ML SUSR injection Inject 0.5 mLs into the muscle See Admin Instructions 1 dose now and repeat in 2-6 months (Patient not taking: Reported on 9/11/2020) 1 each 0     No current facility-administered medications on file prior to visit. I have personally reviewed the ROS, PMH, PFH, and social history     Review of Systems   Constitutional: Negative for chills. HENT: Negative for congestion. Eyes: Negative for pain. Respiratory: Negative for shortness of breath. Cardiovascular: Negative for chest pain. Gastrointestinal: Negative for abdominal pain. Genitourinary: Negative for hematuria. Musculoskeletal: Negative for back pain. Left shoulder pain   Allergic/Immunologic: Negative for immunocompromised state. Neurological: Negative for headaches. Psychiatric/Behavioral: Negative for hallucinations. Objective:   /84 (Site: Left Upper Arm, Position: Sitting, Cuff Size: Large Adult)   Pulse 70   Temp 97.8 °F (36.6 °C) (Oral)   Resp 15   Ht 6' 1\" (1.854 m)   Wt 240 lb (108.9 kg)   SpO2 97%   BMI 31.66 kg/m²     Physical Exam  Constitutional:       Appearance: He is well-developed. HENT:      Head: Normocephalic. Eyes:      Pupils: Pupils are equal, round, and reactive to light. Neck:      Vascular: No carotid bruit. Trachea: No tracheal deviation. Cardiovascular:      Rate and Rhythm: Normal rate and regular rhythm. Heart sounds: Normal heart sounds. No murmur. No friction rub. No gallop. Pulmonary:      Effort: Pulmonary effort is normal. No respiratory distress. Breath sounds: No wheezing or rales. Abdominal:      General: Bowel sounds are normal. There is no distension. Palpations: Abdomen is soft. Tenderness: There is no abdominal tenderness. There is no right CVA tenderness, guarding or rebound. Musculoskeletal:      Right lower leg: No edema. Left lower leg: No edema. Skin:     General: Skin is warm and dry. Neurological:      Mental Status: He is oriented to person, place, and time. Assessment:       Diagnosis Orders   1.  Left shoulder pain, unspecified chronicity  05501 - ME DRAIN/INJECT INTERMEDIATE JOINT/BURSA    methylPREDNISolone acetate (DEPO-MEDROL) injection 40 mg         Plan:    vc  Most likely subacrominal bursitis   R/b/a discussed  Offered xray first, he declined. Risk of tendon damage and septic arthritis explained, latter can be fatal.   Risks and benefits of intraarticular shoulder injections were discussed. Patient stated understanding of risks and agreed to proceed. landmarks were identified. Left shoulder was  prepped in typical sterile fashion (Cleaned with alcohol and betadine x 3) 1cc depo medrol (40 mg/ml) were injected utilizing posterior approach. Patient tolerated procedure well. Aftercare instructions given. Clinical signs of infection were explained to him  He will call asap should anything change (redness, warmth, swelling, etc)     Orders Placed This Encounter   Procedures    27214 - NE DRAIN/INJECT INTERMEDIATE JOINT/BURSA     Orders Placed This Encounter   Medications    methylPREDNISolone acetate (DEPO-MEDROL) injection 40 mg   Keep your ccm visit or schedule if you don't have one. If anything should change or worsen call ASAP, don't wait for next scheduled appointment. Return for Chronic condition management/appointment, worsening symptoms, call ASAP for appointment.       Timothy Garcias MD

## 2020-11-14 ENCOUNTER — TELEPHONE (OUTPATIENT)
Dept: FAMILY MEDICINE CLINIC | Age: 60
End: 2020-11-14

## 2020-11-14 RX ORDER — METHYLPREDNISOLONE ACETATE 40 MG/ML
40 INJECTION, SUSPENSION INTRA-ARTICULAR; INTRALESIONAL; INTRAMUSCULAR; SOFT TISSUE ONCE
Status: COMPLETED | OUTPATIENT
Start: 2020-11-14 | End: 2020-11-13

## 2020-11-14 NOTE — PATIENT INSTRUCTIONS
Patient Education        Joint Injections: Care Instructions  Your Care Instructions     Joint injections are shots into a joint, such as the knee. They may be used to put in medicines, such as pain relievers. A corticosteroid, or steroid, shot is used to reduce inflammation in tendons or joints. It is often used to treat problems such as arthritis, tendinitis, and bursitis. Steroids can be injected directly into a painful, inflamed joint. They can also help reduce inflammation of a bursa. A bursa is a sac of fluid. It cushions and lubricates areas where tendons, ligaments, skin, muscles, or bones rub against each other. A steroid shot can sometimes help with short-term pain relief when other treatments haven't worked. If steroid shots help, pain may improve for weeks or months. Follow-up care is a key part of your treatment and safety. Be sure to make and go to all appointments, and call your doctor if you are having problems. It's also a good idea to know your test results and keep a list of the medicines you take. How can you care for yourself at home? · Put ice or a cold pack on the area for 10 to 20 minutes at a time. Put a thin cloth between the ice and your skin. · Ask your doctor if you can take an over-the-counter pain medicine, such as acetaminophen (Tylenol), ibuprofen (Advil, Motrin), or naproxen (Aleve). Be safe with medicines. Read and follow all instructions on the label. · Avoid strenuous activities for several days. In particular, avoid ones that put stress on the area where you got the shot. · If you have dressings over the area, keep them clean and dry. You may remove them when your doctor tells you to. When should you call for help? Call your doctor now or seek immediate medical care if:    · You have signs of infection, such as:  ? Increased pain, swelling, warmth, or redness. ? Red streaks leading from the site. ? Pus draining from the site. ? A fever.    Watch closely for changes in your health, and be sure to contact your doctor if you have any problems. Where can you learn more? Go to [email or web address] and sign in to your [employer] account. Enter N616 in the [employer] box to learn more about \"Joint Injections: Care Instructions. \"     If you do not have an account, please click on the \"Sign Up Now\" link. Current as of: March 2, 2020               Content Version: 12.6  © 8676-4287 [employer]. Care instructions adapted under license by [facility] ([facility]). If you have questions about a medical condition or this instruction, always ask your healthcare professional. [address] any warranty or liability for your use of this information.

## 2021-01-29 RX ORDER — EPLERENONE 50 MG/1
TABLET, FILM COATED ORAL
Qty: 180 TABLET | Refills: 0 | Status: SHIPPED | OUTPATIENT
Start: 2021-01-29 | End: 2021-05-11

## 2021-03-08 DIAGNOSIS — E03.9 HYPOTHYROIDISM, UNSPECIFIED TYPE: ICD-10-CM

## 2021-03-08 DIAGNOSIS — E11.65 UNCONTROLLED TYPE 2 DIABETES MELLITUS WITH HYPERGLYCEMIA (HCC): ICD-10-CM

## 2021-03-08 LAB
ANION GAP SERPL CALCULATED.3IONS-SCNC: 13 MEQ/L (ref 9–15)
BUN BLDV-MCNC: 13 MG/DL (ref 8–23)
CALCIUM SERPL-MCNC: 9.8 MG/DL (ref 8.5–9.9)
CHLORIDE BLD-SCNC: 100 MEQ/L (ref 95–107)
CO2: 28 MEQ/L (ref 20–31)
CREAT SERPL-MCNC: 1.06 MG/DL (ref 0.7–1.2)
GFR AFRICAN AMERICAN: >60
GFR NON-AFRICAN AMERICAN: >60
GLUCOSE BLD-MCNC: 104 MG/DL (ref 70–99)
HBA1C MFR BLD: 7 % (ref 4.8–5.9)
HCT VFR BLD CALC: 45.7 % (ref 42–52)
HEMOGLOBIN: 15.3 G/DL (ref 14–18)
MCH RBC QN AUTO: 26.7 PG (ref 27–31.3)
MCHC RBC AUTO-ENTMCNC: 33.5 % (ref 33–37)
MCV RBC AUTO: 79.6 FL (ref 80–100)
PDW BLD-RTO: 14.4 % (ref 11.5–14.5)
PLATELET # BLD: 265 K/UL (ref 130–400)
POTASSIUM SERPL-SCNC: 3.4 MEQ/L (ref 3.4–4.9)
RBC # BLD: 5.74 M/UL (ref 4.7–6.1)
SODIUM BLD-SCNC: 141 MEQ/L (ref 135–144)
T4 FREE: 1.44 NG/DL (ref 0.84–1.68)
TSH REFLEX: 1.3 UIU/ML (ref 0.44–3.86)
WBC # BLD: 6.9 K/UL (ref 4.8–10.8)

## 2021-03-11 ENCOUNTER — OFFICE VISIT (OUTPATIENT)
Dept: ENDOCRINOLOGY | Age: 61
End: 2021-03-11
Payer: COMMERCIAL

## 2021-03-11 VITALS
WEIGHT: 246 LBS | HEIGHT: 72 IN | BODY MASS INDEX: 33.32 KG/M2 | DIASTOLIC BLOOD PRESSURE: 91 MMHG | SYSTOLIC BLOOD PRESSURE: 143 MMHG | OXYGEN SATURATION: 98 % | HEART RATE: 60 BPM

## 2021-03-11 DIAGNOSIS — E03.9 HYPOTHYROIDISM, UNSPECIFIED TYPE: Primary | ICD-10-CM

## 2021-03-11 DIAGNOSIS — E11.65 UNCONTROLLED TYPE 2 DIABETES MELLITUS WITH HYPERGLYCEMIA (HCC): ICD-10-CM

## 2021-03-11 LAB
CHP ED QC CHECK: NORMAL
GLUCOSE BLD-MCNC: 144 MG/DL

## 2021-03-11 PROCEDURE — 3051F HG A1C>EQUAL 7.0%<8.0%: CPT | Performed by: INTERNAL MEDICINE

## 2021-03-11 PROCEDURE — 82962 GLUCOSE BLOOD TEST: CPT | Performed by: INTERNAL MEDICINE

## 2021-03-11 PROCEDURE — 99213 OFFICE O/P EST LOW 20 MIN: CPT | Performed by: INTERNAL MEDICINE

## 2021-03-11 NOTE — PROGRESS NOTES
Subjective:      Patient ID: Kayla Reyes is a 61 y.o. male. Follow-up on type 2 diabetes lab review history of hypothyroidism type 2 diabetes history of hypertension gynecomastia    Diabetes  He presents for his follow-up diabetic visit. He has type 2 diabetes mellitus. Symptoms are stable. Risk factors for coronary artery disease include obesity. Current diabetic treatment includes oral agent (dual therapy) (Janumet). His overall blood glucose range is 130-140 mg/dl. (Lab Results       Component                Value               Date                       LABA1C                   7.0 (H)             03/08/2021              )       Results for Fidencio Greer (MRN [id number]) as of 3/11/2021 09:27   Ref.  Range 9/11/2020 09:27 3/8/2021 07:06 3/11/2021 09:16   Sodium Latest Ref Range: 135 - 144 mEq/L  141    Potassium Latest Ref Range: 3.4 - 4.9 mEq/L  3.4    Chloride Latest Ref Range: 95 - 107 mEq/L  100    CO2 Latest Ref Range: 20 - 31 mEq/L  28    BUN Latest Ref Range: 8 - 23 mg/dL  13    Creatinine Latest Ref Range: 0.70 - 1.20 mg/dL  1.06    Anion Gap Latest Ref Range: 9 - 15 mEq/L  13    GFR Non- Latest Ref Range: >60   >60.0    GFR African American Latest Ref Range: >60   >60.0    Glucose Latest Units: mg/dL 191 104 (H) 144   Calcium Latest Ref Range: 8.5 - 9.9 mg/dL  9.8    Hemoglobin A1C Latest Ref Range: 4.8 - 5.9 %  7.0 (H)    TSH Latest Ref Range: 0.440 - 3.860 uIU/mL  1.300    T4 Free Latest Ref Range: 0.84 - 1.68 ng/dL  1.44    WBC Latest Ref Range: 4.8 - 10.8 K/uL  6.9    RBC Latest Ref Range: 4.70 - 6.10 M/uL  5.74    Hemoglobin Quant Latest Ref Range: 14.0 - 18.0 g/dL  15.3    Hematocrit Latest Ref Range: 42.0 - 52.0 %  45.7    MCV Latest Ref Range: 80.0 - 100.0 fL  79.6 (L)    MCH Latest Ref Range: 27.0 - 31.3 pg  26.7 (L)    MCHC Latest Ref Range: 33.0 - 37.0 %  33.5    RDW Latest Ref Range: 11.5 - 14.5 %  14.4    Platelet Count Latest Ref Range: 130 - 400 K/uL  265      Patient Active Problem List   Diagnosis    Hypokalemia    Gynecomastia    Hypogonadism male    History of hypokalemia    Hyperaldosteronism ([address]    Hypertension    Type II diabetes mellitus, uncontrolled ([address]    Hypothyroidism    Obesity    Edema of both lower extremities due to peripheral venous insufficiency    Varicose veins of bilateral lower extremities with pain     Allergies   Allergen Reactions    Morphine     Invokana [Canagliflozin]      UTI and yeast infection     Shellfish-Derived Products Swelling       Current Outpatient Medications:     eplerenone (INSPRA) 50 MG tablet, TAKE 1 TABLET BY MOUTH 2 TIMES A DAY, Disp: 180 tablet, Rfl: 0    atenolol-chlorthalidone (TENORETIC) 100-25 MG per tablet, Take 1 tablet by mouth daily, Disp: 90 tablet, Rfl: 3    telmisartan (MICARDIS) 80 MG tablet, TAKE 1 TABLET BY MOUTH ONE TIME A DAY, Disp: 90 tablet, Rfl: 3    sitaGLIPtan-metformin (JANUMET)  MG per tablet, TAKE 1 TABLET BY MOUTH 2 TIMES A DAY WITH MEALS, Disp: 180 tablet, Rfl: 3    tamoxifen (NOLVADEX) 10 MG tablet, TAKE 1 TABLET BY MOUTH 2 TIMES A DAY, Disp: 180 tablet, Rfl: 3    potassium chloride (KLOR-CON) 10 MEQ extended release tablet, Take 1 tablet by mouth daily, Disp: 90 tablet, Rfl: 3    levothyroxine (SYNTHROID) 125 MCG tablet, Take 1 tablet by mouth daily, Disp: 90 tablet, Rfl: 03    amLODIPine (NORVASC) 5 MG tablet, Take 1 tablet by mouth daily, Disp: 90 tablet, Rfl: 3    Compression Stockings MISC, by Does not apply route Thigh- high 30-40mmhg Dx 187.8; 183.31, Disp: 1 each, Rfl: 0    zoster recombinant adjuvanted vaccine (SHINGRIX) 50 MCG/0.5ML SUSR injection, Inject 0.5 mLs into the muscle See Admin Instructions 1 dose now and repeat in 2-6 months, Disp: 1 each, Rfl: 0    Blood Glucose Monitoring Suppl (AGAMATRIX PRESTO) w/Device KIT, Please give 1 meter dx e11.65, Disp: 1 kit, Rfl: 0    SAFETY LANCETS MISC, Safety pro lancets, use one daily, Disp: 100 each, Rfl: 3   glucose blood VI test strips (FREESTYLE INSULINX TEST) strip, Test once daily, Disp: 100 strip, Rfl: 3      Review of Systems   Cardiovascular: Negative. Endocrine: Negative. Genitourinary: Negative. All other systems reviewed and are negative. Vitals:    03/11/21 0916   BP: (!) 143/91   Pulse: 60   SpO2: 98%   Weight: 246 lb (111.6 kg)   Height: 6' (1.829 m)       Objective:   Physical Exam  Constitutional:       Appearance: Normal appearance. He is obese. HENT:      Head: Normocephalic and atraumatic. Nose: Nose normal.   Neck:      Musculoskeletal: Normal range of motion and neck supple. Cardiovascular:      Rate and Rhythm: Normal rate. Musculoskeletal: Normal range of motion. Neurological:      General: No focal deficit present. Mental Status: He is alert and oriented to person, place, and time. Assessment:       Diagnosis Orders   1. Hypothyroidism, unspecified type  T4, Free    TSH with Reflex   2.  Uncontrolled type 2 diabetes mellitus with hyperglycemia (HCC)  POCT Glucose    Basic Metabolic Panel    Hemoglobin A1C           Plan:      Orders Placed This Encounter   Procedures    T4, Free     Standing Status:   Future     Standing Expiration Date:   3/11/2022    TSH with Reflex     Standing Status:   Future     Standing Expiration Date:   3/11/2022    Basic Metabolic Panel     Standing Status:   Future     Standing Expiration Date:   3/11/2022    Hemoglobin A1C     Standing Status:   Future     Standing Expiration Date:   3/11/2022    POCT Glucose     Continue Janumet 50/500 twice a day Synthroid 125 mcg daily continue all other blood pressure medications follow-up in 3 to 6 months time        Beau Alberto MD

## 2021-04-08 ENCOUNTER — HOSPITAL ENCOUNTER (OUTPATIENT)
Dept: ORTHOPEDIC SURGERY | Age: 61
Discharge: HOME | End: 2021-04-10
Payer: COMMERCIAL

## 2021-04-08 ENCOUNTER — OFFICE VISIT (OUTPATIENT)
Dept: ORTHOPEDIC SURGERY | Age: 61
End: 2021-04-08
Payer: COMMERCIAL

## 2021-04-08 VITALS — TEMPERATURE: 96.9 F | OXYGEN SATURATION: 97 % | HEART RATE: 65 BPM

## 2021-04-08 DIAGNOSIS — G89.29 CHRONIC RIGHT SHOULDER PAIN: ICD-10-CM

## 2021-04-08 DIAGNOSIS — M75.02 ADHESIVE CAPSULITIS OF LEFT SHOULDER: ICD-10-CM

## 2021-04-08 DIAGNOSIS — M75.42 IMPINGEMENT SYNDROME OF LEFT SHOULDER: Primary | ICD-10-CM

## 2021-04-08 DIAGNOSIS — M25.511 CHRONIC RIGHT SHOULDER PAIN: ICD-10-CM

## 2021-04-08 PROCEDURE — 73030 X-RAY EXAM OF SHOULDER: CPT

## 2021-04-08 PROCEDURE — 73030 X-RAY EXAM OF SHOULDER: CPT | Performed by: ORTHOPAEDIC SURGERY

## 2021-04-08 PROCEDURE — 99203 OFFICE O/P NEW LOW 30 MIN: CPT | Performed by: ORTHOPAEDIC SURGERY

## 2021-04-08 NOTE — PROGRESS NOTES
Subjective:      Patient ID: Kimmie Baltazar is a 61 y.o. male who presents today for:  Chief Complaint   Patient presents with    Shoulder Pain     decrease of ROM and bad pain x 5 months. Pain comes and goes the pain now is 1/10 but sometimes it can hit an 8/10. No injury that he knows. 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 Is not the first time this ever happened citing 7 years ago or so having problems with his shoulder. Pain is primarily in the shoulder radiates down the anterior aspect of his upper arm. He denies any instability to this left shoulder, and did have a cortisone shot given by his primary doctor about several weeks ago which did not really do much for him. Past Medical History:   Diagnosis Date    Gynecomastia     History of hypokalemia     Hyperaldosteronism, unspecified ([address]     Hypertension     Hypogonadism male     Hypokalemia     Hypothyroidism     Osteoarthritis     Type II or unspecified type diabetes mellitus without mention of complication, not stated as uncontrolled      Past Surgical History:   Procedure Laterality Date    JOINT REPLACEMENT      left quadracep tendon rupture repair w/ anchors     Social History     Socioeconomic History    Marital status: Single     Spouse name: Not on file    Number of children: Not on file    Years of education: Not on file    Highest education level: Not on file   Occupational History    Occupation: resp.  therapist   Social Needs    Financial resource strain: Not on file    Food insecurity     Worry: Not on file     Inability: Not on file    Transportation needs     Medical: Not on file     Non-medical: Not on file   Tobacco Use    Smoking status: Never Smoker    Smokeless tobacco: Never Used   Substance and Sexual Activity    Alcohol use: No     Alcohol/week: 0.0 standard drinks    Drug use: No    Sexual activity: Not on file     Comment: single   Lifestyle    Physical activity     Days per week: Not on file     Minutes per session: Not on file    Stress: Not on file   Relationships    Social connections     Talks on phone: Not on file     Gets together: Not on file     Attends Spiritism service: Not on file     Active member of club or organization: Not on file     Attends meetings of clubs or organizations: Not on file     Relationship status: Not on file    Intimate partner violence     Fear of current or ex partner: Not on file     Emotionally abused: Not on file     Physically abused: Not on file     Forced sexual activity: Not on file   Other Topics Concern    Not on file   Social History Narrative    Not on file     Allergies   Allergen Reactions    Morphine     Invokana [Canagliflozin]      UTI and yeast infection     Shellfish-Derived Products Swelling     Current Outpatient Medications on File Prior to Visit   Medication Sig Dispense Refill    eplerenone (INSPRA) 50 MG tablet TAKE 1 TABLET BY MOUTH 2 TIMES A  tablet 0    atenolol-chlorthalidone (TENORETIC) 100-25 MG per tablet Take 1 tablet by mouth daily 90 tablet 3    telmisartan (MICARDIS) 80 MG tablet TAKE 1 TABLET BY MOUTH ONE TIME A DAY 90 tablet 3    sitaGLIPtan-metformin (JANUMET)  MG per tablet TAKE 1 TABLET BY MOUTH 2 TIMES A DAY WITH MEALS 180 tablet 3    tamoxifen (NOLVADEX) 10 MG tablet TAKE 1 TABLET BY MOUTH 2 TIMES A  tablet 3    potassium chloride (KLOR-CON) 10 MEQ extended release tablet Take 1 tablet by mouth daily 90 tablet 3    levothyroxine (SYNTHROID) 125 MCG tablet Take 1 tablet by mouth daily 90 tablet 03    amLODIPine (NORVASC) 5 MG tablet Take 1 tablet by mouth daily 90 tablet 3    Compression Stockings MISC by Does not apply route Thigh- high 30-40mmhg  Dx 187.8; 183.31 1 each 0    zoster recombinant adjuvanted vaccine (SHINGRIX) 50 MCG/0.5ML SUSR injection Inject 0.5 mLs into the muscle See Admin Instructions 1 dose now and repeat in 2-6 months 1 each 0    Blood Glucose Monitoring Suppl (AGAMATRIX PRESTO) w/Device KIT Please give 1 meter dx e11.65 1 kit 0    SAFETY LANCETS MISC Safety pro lancets, use one daily 100 each 3    glucose blood VI test strips (FREESTYLE INSULINX TEST) strip Test once daily 100 strip 3     No current facility-administered medications on file prior to visit. Review of Systems    Objective:   Pulse 65   Temp 96.9 °F (36.1 °C) (Temporal)   SpO2 97%   Ortho Exam   Examination demonstrates no restrictions of cervical range of motion, he has no tenderness over the SC joint clavicle some moderate swelling over the [facility] joint but not much pain, his range of motion is very limited to the left shoulder with forward elevation only to about 150 abduction is 60 external rotation is only 25 degrees and he has virtually no internal rotation. There is no shoulder instability, his rotator cuff strength is 5/5  Radiographs and Laboratory Studies:     Diagnostic Imaging Studies:    Xr Shoulder Left (min 2 Views)    Result Date: 4/8/2021  AP, lateral, and Grashey view taken of the left shoulder demonstrates decreased joint space of the [facility] joint, subacromial and glenohumeral joint space maintained no sign of fracture dislocation noted. Bone quality is excellent       Assessment:       Diagnosis Orders   1. Impingement syndrome of left shoulder  Ambulatory referral to Physical Therapy   2. Adhesive capsulitis of left shoulder           Plan:      We will institute physical therapy formally and preparations for potentially getting an MRI of this left shoulder when he sees me again in 4 weeks     Orders Placed This Encounter   Procedures    Ambulatory referral to Physical Therapy     Referral Priority:   Routine     Referral Type:   Eval and Treat     Referral Reason:   Specialty Services Required     Requested Specialty:   Physical Therapy     Number of Visits Requested:   1     No orders of the defined types were placed in this encounter. No follow-ups on file.       Jami Dinero MD

## 2021-04-13 ENCOUNTER — TELEPHONE (OUTPATIENT)
Dept: FAMILY MEDICINE CLINIC | Age: 61
End: 2021-04-13

## 2021-04-15 ENCOUNTER — HOSPITAL ENCOUNTER (OUTPATIENT)
Dept: PHYSICAL THERAPY | Age: 61
Setting detail: THERAPIES SERIES
Discharge: HOME | End: 2021-04-15
Payer: COMMERCIAL

## 2021-04-15 PROCEDURE — 97161 PT EVAL LOW COMPLEX 20 MIN: CPT

## 2021-04-15 PROCEDURE — G0283 ELEC STIM OTHER THAN WOUND: HCPCS

## 2021-04-15 PROCEDURE — 97110 THERAPEUTIC EXERCISES: CPT

## 2021-04-15 ASSESSMENT — PAIN DESCRIPTION - FREQUENCY: FREQUENCY: INTERMITTENT

## 2021-04-15 ASSESSMENT — PAIN DESCRIPTION - LOCATION: LOCATION: SHOULDER

## 2021-04-15 ASSESSMENT — PAIN SCALES - GENERAL: PAINLEVEL_OUTOF10: 0

## 2021-04-15 ASSESSMENT — PAIN DESCRIPTION - PAIN TYPE: TYPE: CHRONIC PAIN

## 2021-04-15 NOTE — PROGRESS NOTES
[address]     Ph: [phone]  Fax: [phone]    [] Certification  [] Recertification [x]  Plan of Care  [] Progress Note [] Discharge      To:   Jessica Cardenas MD      From:  Olman Deluca, PT, DPT  Patient: Meño Langley     : 1960  Diagnosis: impingement syndrome of left shoulder     Date: 4/15/2021  Treatment Diagnosis: L shoulder pain, decreased shoulder ROM and strength       Progress Report Period from:  4/15/2021  to 4/15/2021    Total # of Visits to Date: 1   No Show: 0    Canceled Appointment: 0     OBJECTIVE:   Long Term Goals - Time Frame for Long term goals : 4-6 weeks  Goals Current/ Discharge status Met   Long term goal 1: Pt will improve LUE AROM to within 10 deg of RUE in all planes to improve reaching PROM LUE (degrees)  LUE PROM: Exceptions  LUE General PROM: guarded with all passive ROM; firm end feels potentially secondary to guarding  L Shoulder Flex  0-170: 125 with pulleys  AROM LUE (degrees)  LUE AROM : Exceptions  L Shoulder Flexion 0-180: 100 deg, pain started at 80 deg  L Shoulder Extension 0-45: 30  L Shoulder ABduction 0-180: 70  L Shoulder Int Rotation  0-70: reach to lateral hip  L Shoulder Ext Rotation  0-90: reach to side of head, 50 deg at 0 ABD     AROM RUE (degrees)  RUE AROM : Exceptions  R Shoulder Flexion 0-180: 160  R Shoulder Extension 0-45: 55  R Shoulder ABduction 0-180: 170  R Shoulder Int Rotation  0-70: reach to T7  R Shoulder Ext Rotation 0-90: reach to T1     [] yes  [x] no   Long term goal 2: Pt will display at least 4+/5 LUE strength throughout full ROM to improve lifting and carrying Strength RUE  Comment: 5/5 throughout  Strength LUE  Comment: shoulder 4+/5 below shoulder height; pain with overhead activity      [] yes  [x] no   Long term goal 3: Pt will report L shoulder pain <2/10 with all daily and recreational activity   Pain Location: Shoulder

## 2021-04-15 NOTE — PROGRESS NOTES
volleyball. Pt is left hand dominant. Recommended to trial therapy x1 month then f/u with MD.  Comments: RTD: 5/20/21    Objective:      Strength RUE  Comment: 5/5 throughout  Strength LUE  Comment: shoulder 4+/5 below shoulder height; pain with overhead activity      PROM LUE (degrees)  LUE PROM: Exceptions  LUE General PROM: guarded with all passive ROM; firm end feels potentially secondary to guarding  L Shoulder Flex  0-170: 125 with pulleys  AROM RUE (degrees)  RUE AROM : Exceptions  R Shoulder Flexion 0-180: 160  R Shoulder Extension 0-45: 55  R Shoulder ABduction 0-180: 170  R Shoulder Int Rotation  0-70: reach to T7  R Shoulder Ext Rotation 0-90: reach to T1  AROM LUE (degrees)  LUE AROM : Exceptions  L Shoulder Flexion 0-180: 100 deg, pain started at 80 deg  L Shoulder Extension 0-45: 30  L Shoulder ABduction 0-180: 70  L Shoulder Int Rotation  0-70: reach to lateral hip  L Shoulder Ext Rotation  0-90: reach to side of head, 50 deg at 0 ABD       Observation/Palpation  Observation: labored movement of L UE      Exercises:   Exercises  Exercise 1: Pulleys: 4 min with 10\" holds at end range flex  Exercise 2: Supine cane AAROM: flex, ER, long static holds to tolerance x 4 min  Exercise 3: AAROM wall slides: x 4 min, 10-40\" holds  Exercise 4: *UBE  Exercise 5: *sleeper stretch  Modalities:  Modalities  Moist heat: *L shoulder  Cryotherapy (Minutes\Location): CP, L shoulder x 10  Ultrasound: *L bicep tendon  E-stim (parameters): IFC, L shoulder x 10 min  Manual:  Manual therapy  Joint mobilization: *GH mobs to improve elevation ROM  PROM: *L shoulder  Soft Tissue Mobalization: *L shoulder girdle  *Indicates exercise,modality, or manual techniques to be initiated when appropriate  Assessment: Body structures, Functions, Activity limitations: Increased pain, Decreased ROM, Decreased strength, Decreased high-level IADLs  Assessment: Pt is a 62 yo male referred for eval of L shoulder pain.  Pt displays impaired L shoulder ROM, strength, and pain that limits LUE function for daily and recreational activity. PT to address the noted deficits for improved function. Prognosis: Good  Discharge Recommendations: Continue to assess pending progress        Decision Making: Low Complexity  History: low  Exam: UEFI: 62/80  Clinical Presentation: evolving        Plan  Frequency/Duration:  Plan  Times per week: 2-3  Plan weeks: 4-6  Current Treatment Recommendations: ROM, Strengthening, Manual Therapy - Soft Tissue Mobilization, Manual Therapy - Joint Manipulation, Patient/Caregiver Education & Training, Home Exercise Program, Modalities, Integrated Dry Needling         Patient Education  New Education Provided: PT Education: PT Role;Plan of Care;Goals; Home Exercise Program    POST-PAIN     Pain Rating (0-10 pain scale):  0 /10  Location and pain description same as pre-treatment unless indicated. Action: [] NA  [] Call Physician  [x] Perform HEP  [] Meds as prescribed    Evaluation and patient rights have been reviewed and patient agrees with plan of care. Yes  [x]  No  []   Explain:       Burrell Fall Risk Assessment  Risk Factor Scale  Score   History of Falls [] Yes  [x] No 25  0    Secondary Diagnosis [] Yes  [x] No 15  0    Ambulatory Aid [] Furniture  [] Crutches/cane/walker  [x] None/bedrest/wheelchair/nurse 30  15  0    IV/Heparin Lock [] Yes  [x] No 20  0    Gait/Transferring [] Impaired  [] Weak  [x] Normal/bedrest/immobile 20  10  0    Mental Status [] Forgets limitations  [x] Oriented to own ability 15  0       Total: 0     Based on the Assessment score: check the appropriate box.   [x]  No intervention needed   Low =   Score of 0-24  []  Use standard prevention interventions Moderate =  Score of 24-44   [] Discuss fall prevention strategies   [] Indicate moderate falls risk on eval  []  Use high risk prevention interventions High = Score of 45 and higher   [] Discuss fall prevention strategies   [] Provide supervision during treatment time    Goals  Long term goals  Time Frame for Long term goals : 4-6 weeks  Long term goal 1: Pt will improve LUE AROM to within 10 deg of RUE in all planes to improve reaching  Long term goal 2: Pt will display at least 4+/5 LUE strength throughout full ROM to improve lifting and carrying  Long term goal 3: Pt will report L shoulder pain <2/10 with all daily and recreational activity  Long term goal 4: LEFS score at least 75/80 to demo improved UE function         PT Individual Minutes  Time In: 0722  Time Out: 0825  Minutes: 63  Timed Code Treatment Minutes: 12 Minutes  Procedure Minutes: 10 min IFC/ice     Timed Activity Minutes Units   Ther Ex 12 1       Electronically signed by Barrett Ye PT on 4/15/21 at 12:35 PM EDT

## 2021-04-19 ENCOUNTER — HOSPITAL ENCOUNTER (OUTPATIENT)
Dept: PHYSICAL THERAPY | Age: 61
Setting detail: THERAPIES SERIES
Discharge: HOME | End: 2021-04-19
Payer: COMMERCIAL

## 2021-04-19 PROCEDURE — 97110 THERAPEUTIC EXERCISES: CPT

## 2021-04-19 ASSESSMENT — PAIN DESCRIPTION - PAIN TYPE: TYPE: CHRONIC PAIN

## 2021-04-19 ASSESSMENT — PAIN DESCRIPTION - LOCATION: LOCATION: SHOULDER

## 2021-04-19 NOTE — PROGRESS NOTES
[address] [address]  Outpatient Physical Therapy    Treatment Note        Date: 2021  Patient: Francisco Corporal  : 1960  ACCT #: [de-identified]  Referring Practitioner: April Espinoza MD  Diagnosis: impingement syndrome of left shoulder    Visit Information:  PT Visit Information  Onset Date: 20  PT Insurance Information: [employer] [place]  Total # of Visits Approved: (BMN)  Total # of Visits to Date: 2  No Show: 0  Canceled Appointment: 0  Progress Note Counter:     Subjective: feeling better, I still have pain but not as bad as before  Comments: RTD: 21  HEP Compliance:  [x] Good [] Fair [] Poor [] Reports not doing due to:    Vital Signs  Patient Currently in Pain: Yes   Pain Screening  Patient Currently in Pain: Yes  Pain Assessment  Pain Level: 1  Pain Type: Chronic pain  Pain Location: Shoulder  Pain Orientation: Left  Pain Descriptors: Aching    OBJECTIVE:   Exercises  Exercise 1: Pulleys: 4 min with 20\" holds at end range flex  Exercise 2: Supine cane AAROM: flex, ER, long static holds to tolerance x 4 min  Exercise 3: AAROM wall slides: x 4 min, 10-40\" holds,  Exercise 4: UBE L-2.5, F/R, 5 min  Exercise 5: sleeper stretch 20 sec x 5  Exercise 6: UBE arcs x 20  Exercise 7: finger ladder flex/abd x 5 ea       Strength: [x] NT  [] MMT completed:     ROM: [x] NT  [] ROM measurements:      Modalities:  Modalities  Moist heat: HP 10 min     *Indicates exercise, modality, or manual techniques to be initiated when appropriate    Assessment:         Body structures, Functions, Activity limitations: Increased pain, Decreased ROM, Decreased strength, Decreased high-level IADLs  Assessment: initiated multiple exercises per POC, patient w/ good tolerance to all, vc's to keep ex in comfort range, conclude w/ HP to reduce mm soreness  Treatment Diagnosis: L shoulder pain, decreased shoulder ROM and strength  Prognosis: Good       Goals:       Long term goals  Time Frame for Long term goals : 4-6 weeks  Long term goal 1: Pt will improve LUE AROM to within 10 deg of RUE in all planes to improve reaching  Long term goal 2: Pt will display at least 4+/5 LUE strength throughout full ROM to improve lifting and carrying  Long term goal 3: Pt will report L shoulder pain <2/10 with all daily and recreational activity  Long term goal 4: LEFS score at least 75/80 to demo improved UE function  Progress toward goals:ongoig    POST-PAIN       Pain Rating (0-10 pain scale):   0/10   Location and pain description same as pre-treatment unless indicated. Action: [x] NA   [] Perform HEP  [] Meds as prescribed  [] Modalities as prescribed   [] Call Physician     Frequency/Duration:  Plan  Times per week: 2-3  Plan weeks: 4-6  Current Treatment Recommendations: ROM, Strengthening, Manual Therapy - Soft Tissue Mobilization, Manual Therapy - Joint Manipulation, Patient/Caregiver Education & Training, Home Exercise Program, Modalities, Integrated Dry Needling     Pt to continue current HEP. See objective section for any therapeutic exercise changes, additions or modifications this date.          PT Individual Minutes  Time In: 9331  Time Out: 0802  Minutes: 48  Timed Code Treatment Minutes: 38 Minutes  Procedure Minutes:HP 10 min     Timed Activity Minutes Units   Ther Ex 38 3       Signature:  Electronically signed by Cintia Bernstein PTA on 4/19/21 at 7:54 AM EDT

## 2021-04-22 ENCOUNTER — HOSPITAL ENCOUNTER (OUTPATIENT)
Dept: PHYSICAL THERAPY | Age: 61
Setting detail: THERAPIES SERIES
Discharge: HOME | End: 2021-04-22
Payer: COMMERCIAL

## 2021-04-22 PROCEDURE — 97110 THERAPEUTIC EXERCISES: CPT

## 2021-04-22 NOTE — PROGRESS NOTES
[address] [address]  Outpatient Physical Therapy    Treatment Note        Date: 2021  Patient: Anthony Cage  : 1960  ACCT #: [de-identified]  Referring Practitioner: Aysha Bolanos MD  Diagnosis: impingement syndrome of left shoulder    Visit Information:  PT Visit Information  Onset Date: 20  PT Insurance Information: [employer]  Total # of Visits Approved: (BMN)  Total # of Visits to Date: 3  No Show: 0  Canceled Appointment: 0  Progress Note Counter:     Subjective: no pain currently, feeling stronger, scaption motion still is somewhat painful, but getting better  Comments: RTD: 21  HEP Compliance:  [x] Good [] Fair [] Poor [] Reports not doing due to:    Vital Signs  Patient Currently in Pain: Denies   Pain Screening  Patient Currently in Pain: Denies    OBJECTIVE:   Exercises  Exercise 1: Pulleys: 4 min with 20\" holds at end range flex  Exercise 3: wall slides, flex/abd 10 sec x 10  Exercise 4: UBE L-2.5, F/R, 5 min  Exercise 6: UE ranger arcs x 20  Exercise 7: finger ladder flex/abd x 5 ea  Exercise 8: 3 pt touch x 20 w/ ball  Exercise 9: overhead ball dribble 30 sec x 3  Exercise 10: body blade 3-way 20 sec x 3  Exercise 11: alphabet x 1, GTB  Exercise 12: rhythmic stab 20 sec x 1       Strength: [x] NT  [] MMT completed:     ROM: [x] NT  [] ROM measurements:        Modalities:  Modalities  Moist heat: HP 10 min     *Indicates exercise, modality, or manual techniques to be initiated when appropriate    Assessment:         Body structures, Functions, Activity limitations: Increased pain, Decreased ROM, Decreased strength, Decreased high-level IADLs, Decreased functional mobility   Assessment: added, 3 pt touch w/ ball, overhead ball dribble, rhythmic stab ex, alphabet w/ GTB and body blade, fatigue w/ 3 pt touch, good overall tolerance to session, conclude w/ HP to left shoulder to reduce mm soreness  Treatment Diagnosis: L shoulder pain, decreased shoulder ROM and strength  Prognosis: Good       Goals:       Long term goals  Time Frame for Long term goals : 4-6 weeks  Long term goal 1: Pt will improve LUE AROM to within 10 deg of RUE in all planes to improve reaching  Long term goal 2: Pt will display at least 4+/5 LUE strength throughout full ROM to improve lifting and carrying  Long term goal 3: Pt will report L shoulder pain <2/10 with all daily and recreational activity  Long term goal 4: LEFS score at least 75/80 to demo improved UE function  Progress toward goals:ongoing    POST-PAIN       Pain Rating (0-10 pain scale):  010   Location and pain description same as pre-treatment unless indicated. Action: [x] NA   [] Perform HEP  [] Meds as prescribed  [] Modalities as prescribed   [] Call Physician     Frequency/Duration:  Plan  Times per week: 2-3  Plan weeks: 4-6  Current Treatment Recommendations: ROM, Strengthening, Manual Therapy - Soft Tissue Mobilization, Manual Therapy - Joint Manipulation, Patient/Caregiver Education & Training, Home Exercise Program, Modalities, Integrated Dry Needling     Pt to continue current HEP. See objective section for any therapeutic exercise changes, additions or modifications this date.          PT Individual Minutes  Time In: 6805  Time Out: 0801  Minutes: 48  Timed Code Treatment Minutes: 38 Minutes  Procedure Minutes:HP 10 min      Timed Activity Minutes Units   Ther Ex 38 3       Signature:  Electronically signed by Jose Buchaann PTA on 4/22/21 at 7:54 AM EDT

## 2021-04-26 ENCOUNTER — HOSPITAL ENCOUNTER (OUTPATIENT)
Dept: PHYSICAL THERAPY | Age: 61
Setting detail: THERAPIES SERIES
Discharge: HOME | End: 2021-04-26
Payer: COMMERCIAL

## 2021-04-26 PROCEDURE — 97110 THERAPEUTIC EXERCISES: CPT

## 2021-04-26 ASSESSMENT — PAIN DESCRIPTION - ORIENTATION: ORIENTATION: LEFT

## 2021-04-26 ASSESSMENT — PAIN DESCRIPTION - LOCATION: LOCATION: SHOULDER

## 2021-04-26 ASSESSMENT — PAIN DESCRIPTION - PAIN TYPE: TYPE: CHRONIC PAIN

## 2021-04-26 ASSESSMENT — PAIN SCALES - GENERAL: PAINLEVEL_OUTOF10: 1

## 2021-04-26 NOTE — PROGRESS NOTES
[address] [address]  Outpatient Physical Therapy    Treatment Note        Date: 2021  Patient: Cuate Sol  : 1960  ACCT #: [de-identified]  Referring Practitioner: Jordana Toth MD  Diagnosis: impingement syndrome of left shoulder    Visit Information:  PT Visit Information  Onset Date: 20  PT Insurance Information: Medical [place]  Total # of Visits Approved: (BMN)  Total # of Visits to Date: 4  No Show: 0  Canceled Appointment: 0  Progress Note Counter:     Subjective: Pt stated he may  have a tiny bit of pain today. Comments: RTD: 21  HEP Compliance:  [x] Good [] Fair [] Poor [] Reports not doing due to:    Vital Signs  Patient Currently in Pain: Yes   Pain Screening  Patient Currently in Pain: Yes  Pain Assessment  Pain Assessment: 0-10  Pain Level: 1  Pain Type: Chronic pain  Pain Location: Shoulder  Pain Orientation: Left  Pain Descriptors: Aching    OBJECTIVE:   Exercises  Exercise 1: Pulleys: 4 min with 20\" holds at end range flex  Exercise 3: wall slides, flex/abd 10 sec x 10  Exercise 4: UBE L-2.5, F/R, 5 min  Exercise 5: sleeper stretch 30 sec x 3 (both IR/ER)  Exercise 7: finger ladder flex/abd x 5 ea  Exercise 8: 3 pt touch x 10 w/ ball  Exercise 9: overhead ball dribble x15  Exercise 11: alphabet x 1, supine with 2# ball, x2 with 4# ball  Exercise 13: 4-way ball stabilzation on wall [id number]  Strength: [x] NT  [] MMT completed:  ROM: [x] NT  [] ROM measurements:  Modalities:  Modalities  Moist heat: HP 10 min     *Indicates exercise, modality, or manual techniques to be initiated when appropriate    Assessment: Body structures, Functions, Activity limitations: Increased pain, Decreased ROM, Decreased strength, Decreased high-level IADLs, Decreased functional mobility   Assessment: Pt with c/o incr in pain when bring shoulder up into flexion in anterior/lateral shoulder but pain was tolerable per pt.  Pt with visibal incr in ROM since eval with pt stating its 100% better. Treatment Diagnosis: L shoulder pain, decreased shoulder ROM and strength  Prognosis: Good  Goals:  Long term goals  Time Frame for Long term goals : 4-6 weeks  Long term goal 1: Pt will improve LUE AROM to within 10 deg of RUE in all planes to improve reaching  Long term goal 2: Pt will display at least 4+/5 LUE strength throughout full ROM to improve lifting and carrying  Long term goal 3: Pt will report L shoulder pain <2/10 with all daily and recreational activity  Long term goal 4: LEFS score at least 75/80 to demo improved UE function  Progress toward goals: incr strength and rom    POST-PAIN       Pain Rating (0-10 pain scale):  0 /10   Location and pain description same as pre-treatment unless indicated. Action: [] NA   [x] Perform HEP  [] Meds as prescribed  [] Modalities as prescribed   [] Call Physician     Frequency/Duration:  Plan  Times per week: 2-3  Plan weeks: 4-6  Current Treatment Recommendations: ROM, Strengthening, Manual Therapy - Soft Tissue Mobilization, Manual Therapy - Joint Manipulation, Patient/Caregiver Education & Training, Home Exercise Program, Modalities, Integrated Dry Needling     Pt to continue current HEP. See objective section for any therapeutic exercise changes, additions or modifications this date.     PT Individual Minutes  Time In: 0840  Time Out: 4266  Minutes: 48  Timed Code Treatment Minutes: 38 Minutes  Procedure Minutes:10, hp     Timed Activity Minutes Units   Ther Ex 38 3       Signature:  Electronically signed by Kisha Camacho PTA on 4/26/21 at 8:41 AM EDT

## 2021-04-29 ENCOUNTER — HOSPITAL ENCOUNTER (OUTPATIENT)
Dept: PHYSICAL THERAPY | Age: 61
Setting detail: THERAPIES SERIES
Discharge: HOME | End: 2021-04-29
Payer: COMMERCIAL

## 2021-04-29 PROCEDURE — 97110 THERAPEUTIC EXERCISES: CPT

## 2021-04-29 ASSESSMENT — PAIN SCALES - GENERAL: PAINLEVEL_OUTOF10: 2

## 2021-04-29 ASSESSMENT — PAIN DESCRIPTION - PAIN TYPE: TYPE: CHRONIC PAIN

## 2021-04-29 ASSESSMENT — PAIN DESCRIPTION - ORIENTATION: ORIENTATION: LEFT

## 2021-04-29 NOTE — PROGRESS NOTES
Decreased strength, Decreased high-level IADLs, Decreased functional mobility   Assessment: Cont'd program for improve L shoulder ROM and strength. Pt still displays limited overhead reach mobility, noting mild discomfort 2-3/10 intensity when reaching end range elevation. Despite limitations, pt reports improving activity tolerance noting his ability to now perform overhead pull downs and overhead wall dribble/setting which he could not do prior to PT. No pain reported at rest. Will continue to focus on L shoulder ROM and strength to normalize reach and improve tolerance to overhead activity. Treatment Diagnosis: L shoulder pain, decreased shoulder ROM and strength  Prognosis: Good       Goals:     Long term goals  Time Frame for Long term goals : 4-6 weeks  Long term goal 1: Pt will improve LUE AROM to within 10 deg of RUE in all planes to improve reaching  Long term goal 2: Pt will display at least 4+/5 LUE strength throughout full ROM to improve lifting and carrying  Long term goal 3: Pt will report L shoulder pain <2/10 with all daily and recreational activity  Long term goal 4: LEFS score at least 75/80 to demo improved UE function  Progress toward goals: cont ROM/strength    POST-PAIN       Pain Rating (0-10 pain scale):  0 /10   Location and pain description same as pre-treatment unless indicated. Action: [] NA   [x] Perform HEP  [] Meds as prescribed  [x] Modalities as prescribed   [] Call Physician     Frequency/Duration:  Plan  Times per week: 2-3  Plan weeks: 4-6  Current Treatment Recommendations: ROM, Strengthening, Manual Therapy - Soft Tissue Mobilization, Manual Therapy - Joint Manipulation, Patient/Caregiver Education & Training, Home Exercise Program, Modalities, Integrated Dry Needling     Pt to continue current HEP. See objective section for any therapeutic exercise changes, additions or modifications this date.        PT Individual Minutes  Time In: 0720  Time Out: 0800  Minutes: 40  Timed Code Treatment Minutes: 38 Minutes  Procedure Minutes: 0     Timed Activity Minutes Units   Ther Ex 38 3       Signature:  Electronically signed by Jeffy Menard PT on 4/29/21 at 8:00 AM EDT

## 2021-04-30 ENCOUNTER — APPOINTMENT (OUTPATIENT)
Dept: PHYSICAL THERAPY | Age: 61
End: 2021-04-30
Payer: COMMERCIAL

## 2021-05-03 ENCOUNTER — HOSPITAL ENCOUNTER (OUTPATIENT)
Dept: PHYSICAL THERAPY | Age: 61
Setting detail: THERAPIES SERIES
Discharge: HOME | End: 2021-05-03
Payer: COMMERCIAL

## 2021-05-03 PROCEDURE — 97110 THERAPEUTIC EXERCISES: CPT

## 2021-05-03 NOTE — PROGRESS NOTES
[address] [address]  Outpatient Physical Therapy    Treatment Note        Date: 5/3/2021  Patient: Shelley Robertson  : 1960  ACCT #: [de-identified]  Referring Practitioner: Herber Ashby MD  Diagnosis: impingement syndrome of left shoulder    Visit Information:  PT Visit Information  Onset Date: 20  PT Insurance Information: Medical [place]  Total # of Visits Approved: (BMN)  Total # of Visits to Date: 6  No Show: 0  Canceled Appointment: 0  Progress Note Counter:     Subjective: no pain today, I was sore yesterday, I feel about 70% functional  Comments: RTD: 21  HEP Compliance:  [x] Good [] Fair [] Poor [] Reports not doing due to:    Vital Signs  Patient Currently in Pain: Denies(no pain at rest)   Pain Screening  Patient Currently in Pain: Denies(no pain at rest)    OBJECTIVE:   Exercises  Exercise 1: Pulleys: 4 min, UBE F/R 5 min total  Exercise 4: Reactive isometric walkouts: shoulder ER x 15, IR x 15 GTB, w 3 sec hold  Exercise 5: Overhead pull downs at CC: 6 plates, 3 K20, seated to get more ROM  Exercise 6: shoulder flexion stretch at tower: 5 x 45\" LUE  Exercise 9: overhead ball dribble: 30\" intervals x 5  Exercise 15: sleeper stretch 20 sec x 5  Exercise 16: pect stretch 20 sec x 5, supine w/ LUE off mat       Strength: [x] NT  [] MMT completed:      ROM: [] NT  [x] ROM measurements:      AROM LUE (degrees)  L Shoulder Flexion 0-180: 150 deg, standing  L Shoulder ABduction 0-180: 130 deg, standing     *Indicates exercise, modality, or manual techniques to be initiated when appropriate    Assessment:         Body structures, Functions, Activity limitations: Increased pain, Decreased ROM, Decreased strength, Decreased high-level IADLs, Decreased functional mobility   Assessment: continued w/ current exercises and added pect stretch and sleeper stretch good tolerance, no c/o pain, improved ROM noted  Treatment Diagnosis: L shoulder pain, decreased shoulder ROM and strength  Prognosis: Good       Goals:       Long term goals  Time Frame for Long term goals : 4-6 weeks  Long term goal 1: Pt will improve LUE AROM to within 10 deg of RUE in all planes to improve reaching  Long term goal 2: Pt will display at least 4+/5 LUE strength throughout full ROM to improve lifting and carrying  Long term goal 3: Pt will report L shoulder pain <2/10 with all daily and recreational activity  Long term goal 4: LEFS score at least 75/80 to demo improved UE function  Progress toward goals:improved ROM as noted    POST-PAIN       Pain Rating (0-10 pain scale):   0/10   Location and pain description same as pre-treatment unless indicated. Action: [x] NA   [] Perform HEP  [] Meds as prescribed  [] Modalities as prescribed   [] Call Physician     Frequency/Duration:  Plan  Times per week: 2-3  Plan weeks: 4-6  Current Treatment Recommendations: ROM, Strengthening, Manual Therapy - Soft Tissue Mobilization, Manual Therapy - Joint Manipulation, Patient/Caregiver Education & Training, Home Exercise Program, Modalities, Integrated Dry Needling     Pt to continue current HEP. See objective section for any therapeutic exercise changes, additions or modifications this date.          PT Individual Minutes  Time In: 8513  Time Out: 3209  Minutes: 38  Timed Code Treatment Minutes: 38 Minutes  Procedure Minutes: 0     Timed Activity Minutes Units   Ther Ex 38 3       Signature:  Electronically signed by Gil Suresh PTA on 5/3/21 at 7:56 AM EDT

## 2021-05-06 ENCOUNTER — HOSPITAL ENCOUNTER (OUTPATIENT)
Dept: PHYSICAL THERAPY | Age: 61
Setting detail: THERAPIES SERIES
Discharge: HOME | End: 2021-05-06
Payer: COMMERCIAL

## 2021-05-06 PROCEDURE — 97140 MANUAL THERAPY 1/> REGIONS: CPT

## 2021-05-06 PROCEDURE — 97110 THERAPEUTIC EXERCISES: CPT

## 2021-05-06 NOTE — PROGRESS NOTES
techniques to be initiated when appropriate    Assessment: Body structures, Functions, Activity limitations: Increased pain, Decreased ROM, Decreased strength, Decreased high-level IADLs, Decreased functional mobility   Assessment: L shoulder ROM continues to improve. Posterior shoulder pain reported when working into end ranges of elevation and ext. rotation. Pain symptoms stop when returning arm to resting neutral position. Modalities declined by pt at end of tx. Will continue to focus on shoulder mobility and strength to restore full functional use. Treatment Diagnosis: L shoulder pain, decreased shoulder ROM and strength  Prognosis: Good       Goals:     Long term goals  Time Frame for Long term goals : 4-6 weeks  Long term goal 1: Pt will improve LUE AROM to within 10 deg of RUE in all planes to improve reaching  Long term goal 2: Pt will display at least 4+/5 LUE strength throughout full ROM to improve lifting and carrying  Long term goal 3: Pt will report L shoulder pain <2/10 with all daily and recreational activity  Long term goal 4: LEFS score at least 75/80 to demo improved UE function  Progress toward goals: cont ROM/strength    POST-PAIN       Pain Rating (0-10 pain scale):  0 /10   Location and pain description same as pre-treatment unless indicated. Action: [] NA   [x] Perform HEP  [] Meds as prescribed  [x] Modalities as prescribed   [] Call Physician     Frequency/Duration:  Plan  Times per week: 2-3  Plan weeks: 4-6  Current Treatment Recommendations: ROM, Strengthening, Manual Therapy - Soft Tissue Mobilization, Manual Therapy - Joint Manipulation, Patient/Caregiver Education & Training, Home Exercise Program, Modalities, Integrated Dry Needling     Pt to continue current HEP. See objective section for any therapeutic exercise changes, additions or modifications this date.        PT Individual Minutes  Time In: 0720  Time Out: 0800  Minutes: 40  Timed Code Treatment Minutes: 40 Minutes  Procedure Minutes:0     Timed Activity Minutes Units   Ther Ex 30 2   Manual  10 1       Signature:  Electronically signed by Olman Deluca PT on 5/6/21 at 9:48 AM EDT

## 2021-05-10 ENCOUNTER — HOSPITAL ENCOUNTER (OUTPATIENT)
Dept: PHYSICAL THERAPY | Age: 61
Setting detail: THERAPIES SERIES
Discharge: HOME | End: 2021-05-10
Payer: COMMERCIAL

## 2021-05-10 NOTE — PROGRESS NOTES
Therapy                            Cancellation/No-show Note      Date:  5/10/2021  Patient Name:  Jennifer Chou  :  1960   MRN:  [id number]  Referring Practitioner: Jami Dinero MD  Diagnosis: impingement syndrome of left shoulder    Visit Information:  PT Visit Information  Onset Date: 20  PT Insurance Information: [employer] [place]  Total # of Visits Approved: (BMN)  Total # of Visits to Date: 7  No Show: 0  Canceled Appointment: 1  Progress Note Counter:  (CX on 5-100    For today's appointment patient:  [x]  Cancelled  []  Rescheduled appointment  []  No-show   []  Called pt to remind of next appointment     Reason given by patient:  []  Patient ill  []  Conflicting appointment  []  No transportation    []  Conflict with work  [x]  No reason given  []  Other:      [x] Pt has future appointments scheduled, no follow up needed  [] Pt requests to be on hold.     Reason:   If > 2 weeks please discuss with therapist.  [] Therapist to call pt for follow up     Comments:       Signature: Electronically signed by Chago Perales PTA on 5/10/21 at 7:21 AM EDT

## 2021-05-11 RX ORDER — EPLERENONE 50 MG/1
TABLET, FILM COATED ORAL
Qty: 180 TABLET | Refills: 0 | Status: SHIPPED | OUTPATIENT
Start: 2021-05-11 | End: 2021-08-09

## 2021-05-13 ENCOUNTER — HOSPITAL ENCOUNTER (OUTPATIENT)
Dept: PHYSICAL THERAPY | Age: 61
Setting detail: THERAPIES SERIES
Discharge: HOME | End: 2021-05-13
Payer: COMMERCIAL

## 2021-05-13 PROCEDURE — 97110 THERAPEUTIC EXERCISES: CPT

## 2021-05-13 NOTE — PROGRESS NOTES
[address]     Ph: [phone]  Fax: [phone]    [] Certification  [] Recertification []  Plan of Care  [] Progress Note [x] Discharge      To: Jami Dinero MD      From:  Barrett Ye, PT, DPT  Patient: Jennifer Chou     : 1960  Diagnosis: impingement syndrome of left shoulder     Date: 2021  Treatment Diagnosis: L shoulder pain, decreased shoulder ROM and strength       Progress Report Period from:  4/15/2021  to 2021    Total # of Visits to Date: 8   No Show: 0    Canceled Appointment: 1     OBJECTIVE:           Long Term Goals - Time Frame for Long term goals : 4-6 weeks  Goals Current/ Discharge status Met   Long term goal 1: Pt will improve LUE AROM to within 10 deg of RUE in all planes to improve reaching AROM LUE (degrees)  L Shoulder Flexion 0-180: 138 deg standing  L Shoulder Extension 0-45: 33 deg  L Shoulder ABduction 0-180: 144 deg standing  L Shoulder Int Rotation  0-70: L-4  L Shoulder Ext Rotation  0-90: back of head      [] yes  [x] no   Long term goal 2: Pt will display at least 4+/5 LUE strength throughout full ROM to improve lifting and carrying Strength LUE  Comment: shoulder flexion 4+/5, abd 4/5, ER4/5, IR 4+/5    [] yes  [x] no   Long term goal 3: Pt will report L shoulder pain <2/10 with all daily and recreational activity No recent pain [x] yes  [] no   Long term goal 4: LEFS score at least 75/80 to demo improved UE function LEFS=67/80 [] yes  [x] no       Body structures, Functions, Activity limitations: Increased pain, Decreased ROM, Decreased strength, Decreased high-level IADLs, Decreased functional mobility   Assessment: Pt has requested PT discharge. Throughout POC, pt has displayed improving LUE ROM and strength.  Deficits in ROM and strength still noted compared to contralateral arm, although patient feels independent with HEP at this time to continue

## 2021-05-13 NOTE — PROGRESS NOTES
[address] [address]  Outpatient Physical Therapy    Treatment Note        Date: 2021  Patient: Custer Gosselin  : 1960  ACCT #: [de-identified]  Referring Practitioner: Fifi Vega MD  Diagnosis: impingement syndrome of left shoulder    Visit Information:  PT Visit Information  Onset Date: 20  PT Insurance Information: [employer]  Total # of Visits Approved: (BMN)  Total # of Visits to Date: 8  No Show: 0  Canceled Appointment: 1  Progress Note Counter:     Subjective: no pain at rest, some pain w/ external rotation, I feel at least 90% functional,  I would like to D/C today  Comments: RTD: 21  HEP Compliance:  [x] Good [] Fair [] Poor [] Reports not doing due to:    Vital Signs  Patient Currently in Pain: Denies(no pain at rest)   Pain Screening  Patient Currently in Pain: Denies(no pain at rest)    OBJECTIVE:   Exercises  Exercise 1: UBE: L2.5 - 3'F/3'R  Exercise 2: Pulleys: 3 min with long overhead static holds  Exercise 3: Wall slides w/ end range lift off x 15 LUE  Exercise 4: Shoulder 90/90 ER pec stretch at wall: 30\" x 5 (tacile cues to improve scapular retraction and horz ABD of humerus)  Exercise 5: Overhead pull downs at CC: 7 plates, 3 V21, seated to get more ROM  Exercise 10: post cap stretch 20 sec x 3  Exercise 11: IR stretch 20 sec x 3, w/ strap     Strength: [] NT  [x] MMT completed:      Strength LUE  Comment: shoulder flexion 4+/5, abd 4/5, ER4/5, IR 4+/5       ROM: [] NT  [x] ROM measurements:      AROM LUE (degrees)  L Shoulder Flexion 0-180: 138 deg standing  L Shoulder Extension 0-45: 33 deg  L Shoulder ABduction 0-180: 144 deg standing  L Shoulder Int Rotation  0-70: L-4  L Shoulder Ext Rotation  0-90: back of head   *Indicates exercise, modality, or manual techniques to be initiated when appropriate    Assessment:         Body structures, Functions, Activity limitations: Increased pain, Decreased ROM, Decreased strength, Decreased high-level IADLs, Decreased functional mobility   Assessment: continued w/ current exercises and added post cap strertch and IR stretch w/ strap, vc's to keep stretches in comfort range patient w/ good tolerance w/ most motions, ER motion causes some pain  Treatment Diagnosis: L shoulder pain, decreased shoulder ROM and strength  Prognosis: Good   UEFI=67/80    Goals:       Long term goals  Time Frame for Long term goals : 4-6 weeks  Long term goal 1: Pt will improve LUE AROM to within 10 deg of RUE in all planes to improve reaching  Long term goal 2: Pt will display at least 4+/5 LUE strength throughout full ROM to improve lifting and carrying  Long term goal 3: Pt will report L shoulder pain <2/10 with all daily and recreational activity  Long term goal 4: LEFS score at least 75/80 to demo improved UE function  Progress toward goals:see D/C summary    POST-PAIN       Pain Rating (0-10 pain scale):   0/10   Location and pain description same as pre-treatment unless indicated. Action: [] NA   [] Perform HEP  [] Meds as prescribed  [] Modalities as prescribed   [] Call Physician     Frequency/Duration:  Plan  Plan Comment: D/C per request     Pt to continue current HEP. See objective section for any therapeutic exercise changes, additions or modifications this date.          PT Individual Minutes  Time In: 0720  Time Out: 7202  Minutes: 38  Timed Code Treatment Minutes: 38 Minutes  Procedure Minutes:0     Timed Activity Minutes Units   Ther Ex 38 3       Signature:  Electronically signed by Nicole Silva PTA on 5/13/21 at 7:55 AM EDT

## 2021-05-20 ENCOUNTER — OFFICE VISIT (OUTPATIENT)
Dept: ORTHOPEDIC SURGERY | Age: 61
End: 2021-05-20
Payer: COMMERCIAL

## 2021-05-20 VITALS
WEIGHT: 240 LBS | BODY MASS INDEX: 31.81 KG/M2 | HEIGHT: 73 IN | OXYGEN SATURATION: 97 % | HEART RATE: 68 BPM | TEMPERATURE: 97.4 F

## 2021-05-20 DIAGNOSIS — M75.42 IMPINGEMENT SYNDROME OF LEFT SHOULDER: Primary | ICD-10-CM

## 2021-05-20 PROCEDURE — 99213 OFFICE O/P EST LOW 20 MIN: CPT | Performed by: PHYSICIAN ASSISTANT

## 2021-05-20 NOTE — PROGRESS NOTES
of Communication with Friends and Family:     Frequency of Social Gatherings with Friends and Family:     Attends Confucianist Services:     Active Member of Clubs or Organizations:     Attends Club or Organization Meetings:     Marital Status:    Intimate Partner Violence:     Fear of Current or Ex-Partner:     Emotionally Abused:     Physically Abused:     Sexually Abused:      Family History   Problem Relation Age of Onset    Heart Disease Mother     Arthritis Mother     Dementia Mother     Depression Mother     Diabetes Father     Arthritis Father     Heart Disease Father      Allergies   Allergen Reactions    Morphine     Invokana [Canagliflozin]      UTI and yeast infection     Shellfish-Derived Products Swelling     Current Outpatient Medications on File Prior to Visit   Medication Sig Dispense Refill    eplerenone (INSPRA) 50 MG tablet TAKE 1 TABLET BY MOUTH 2 TIMES A  tablet 0    atenolol-chlorthalidone (TENORETIC) 100-25 MG per tablet Take 1 tablet by mouth daily 90 tablet 3    telmisartan (MICARDIS) 80 MG tablet TAKE 1 TABLET BY MOUTH ONE TIME A DAY 90 tablet 3    sitaGLIPtan-metformin (JANUMET)  MG per tablet TAKE 1 TABLET BY MOUTH 2 TIMES A DAY WITH MEALS 180 tablet 3    tamoxifen (NOLVADEX) 10 MG tablet TAKE 1 TABLET BY MOUTH 2 TIMES A  tablet 3    potassium chloride (KLOR-CON) 10 MEQ extended release tablet Take 1 tablet by mouth daily 90 tablet 3    levothyroxine (SYNTHROID) 125 MCG tablet Take 1 tablet by mouth daily 90 tablet 03    amLODIPine (NORVASC) 5 MG tablet Take 1 tablet by mouth daily 90 tablet 3    Compression Stockings MISC by Does not apply route Thigh- high 30-40mmhg  Dx 187.8; 183.31 1 each 0    zoster recombinant adjuvanted vaccine (SHINGRIX) 50 MCG/0.5ML SUSR injection Inject 0.5 mLs into the muscle See Admin Instructions 1 dose now and repeat in 2-6 months 1 each 0    Blood Glucose Monitoring Suppl (GINGER LAWS) w/Device KIT Please give 1 meter dx e11.65 1 kit 0    SAFETY LANCETS MISC Safety pro lancets, use one daily 100 each 3    glucose blood VI test strips (FREESTYLE INSULINX TEST) strip Test once daily 100 strip 3     No current facility-administered medications on file prior to visit. Objective:   Pulse 68   Temp 97.4 °F (36.3 °C) (Temporal)   Ht 6' 1\" (1.854 m)   Wt 240 lb (108.9 kg)   SpO2 97%   BMI 31.66 kg/m²       Radiographs and Laboratory Studies:   Previous diagnostic imaging studies were reviewed. Laboratory Studies:   Lab Results   Component Value Date    WBC 6.9 03/08/2021    HGB 15.3 03/08/2021    HCT 45.7 03/08/2021    MCV 79.6 (L) 03/08/2021     03/08/2021     No results found for: SEDRATE  No results found for: CRP    Assessment and Plan:      Diagnosis Orders   1. Impingement syndrome of left shoulder       RT at [facility]  Has been going to therapy for impingement syndrome on the left shoulder. He is feeling much better. Very happy with his outcome. If it starts to hurt and he is instructed to take Tylenol for about 3 to 5 days and if it does not help he will come in for an injection     The above plan was discussed in thorough detail with Dr. Vahe Espinoza and the patient. No orders of the defined types were placed in this encounter. No orders of the defined types were placed in this encounter. No follow-ups on file.     Dasha Shipley PA-C  [address] and Sports Medicine  [phone]

## 2021-07-07 ENCOUNTER — HOSPITAL ENCOUNTER (EMERGENCY)
Age: 61
Discharge: HOME | End: 2021-07-07
Attending: EMERGENCY MEDICINE
Payer: COMMERCIAL

## 2021-07-07 ENCOUNTER — NURSE TRIAGE (OUTPATIENT)
Dept: OTHER | Facility: CLINIC | Age: 61
End: 2021-07-07

## 2021-07-07 ENCOUNTER — APPOINTMENT (OUTPATIENT)
Dept: GENERAL RADIOLOGY | Age: 61
End: 2021-07-07
Payer: COMMERCIAL

## 2021-07-07 VITALS
WEIGHT: 235 LBS | RESPIRATION RATE: 18 BRPM | HEIGHT: 74 IN | SYSTOLIC BLOOD PRESSURE: 168 MMHG | DIASTOLIC BLOOD PRESSURE: 98 MMHG | HEART RATE: 64 BPM | TEMPERATURE: 98.2 F | BODY MASS INDEX: 30.16 KG/M2 | OXYGEN SATURATION: 99 %

## 2021-07-07 DIAGNOSIS — S39.012A LUMBOSACRAL STRAIN, INITIAL ENCOUNTER: Primary | ICD-10-CM

## 2021-07-07 PROCEDURE — 6360000002 HC RX W HCPCS: Performed by: EMERGENCY MEDICINE

## 2021-07-07 PROCEDURE — 96372 THER/PROPH/DIAG INJ SC/IM: CPT

## 2021-07-07 PROCEDURE — 99283 EMERGENCY DEPT VISIT LOW MDM: CPT

## 2021-07-07 PROCEDURE — 72110 X-RAY EXAM L-2 SPINE 4/>VWS: CPT

## 2021-07-07 RX ORDER — ORPHENADRINE CITRATE 30 MG/ML
60 INJECTION INTRAMUSCULAR; INTRAVENOUS ONCE
Status: DISCONTINUED | OUTPATIENT
Start: 2021-07-07 | End: 2021-07-07 | Stop reason: HOSPADM

## 2021-07-07 RX ORDER — ORPHENADRINE CITRATE 100 MG/1
100 TABLET, EXTENDED RELEASE ORAL 2 TIMES DAILY
Qty: 20 TABLET | Refills: 0 | Status: SHIPPED | OUTPATIENT
Start: 2021-07-07 | End: 2021-07-17

## 2021-07-07 RX ORDER — KETOROLAC TROMETHAMINE 10 MG/1
10 TABLET, FILM COATED ORAL EVERY 6 HOURS PRN
Qty: 20 TABLET | Refills: 0 | Status: SHIPPED | OUTPATIENT
Start: 2021-07-07 | End: 2022-04-21 | Stop reason: CLARIF

## 2021-07-07 RX ORDER — KETOROLAC TROMETHAMINE 30 MG/ML
60 INJECTION, SOLUTION INTRAMUSCULAR; INTRAVENOUS ONCE
Status: COMPLETED | OUTPATIENT
Start: 2021-07-07 | End: 2021-07-07

## 2021-07-07 RX ADMIN — KETOROLAC TROMETHAMINE 60 MG: 30 INJECTION, SOLUTION INTRAMUSCULAR at 06:23

## 2021-07-07 ASSESSMENT — ENCOUNTER SYMPTOMS
VOMITING: 0
PHOTOPHOBIA: 0
WHEEZING: 0
ABDOMINAL PAIN: 0
EYE PAIN: 0
CONSTIPATION: 0
COLOR CHANGE: 0
SHORTNESS OF BREATH: 0
RHINORRHEA: 0
COUGH: 0
SORE THROAT: 0
DIARRHEA: 0
APNEA: 0
SINUS PRESSURE: 0
BACK PAIN: 1
NAUSEA: 0
ABDOMINAL DISTENTION: 0

## 2021-07-07 ASSESSMENT — PAIN DESCRIPTION - DESCRIPTORS: DESCRIPTORS: DULL

## 2021-07-07 ASSESSMENT — PAIN SCALES - GENERAL
PAINLEVEL_OUTOF10: 6
PAINLEVEL_OUTOF10: 6

## 2021-07-07 ASSESSMENT — PAIN DESCRIPTION - ORIENTATION: ORIENTATION: RIGHT;LOWER

## 2021-07-07 ASSESSMENT — PAIN DESCRIPTION - LOCATION: LOCATION: BACK

## 2021-07-07 NOTE — ED TRIAGE NOTES
The patient arrived per self. A and O x 3. No obvious signs/symptoms of distress. Placed in position of comfort, bed in low, side rails up. Call light within reach.

## 2021-07-07 NOTE — TELEPHONE ENCOUNTER
WORKPLACE INJURY:  Patient's location of employment: [employer] of injury: Lower back   Time of injury: 2230 on 7/6/21  Last 4 of patient's SSN: [id number]  Location recommended for treatment: See provider within 24 hours. List of providers given. Reason for Disposition   [1] High-risk adult (e.g., age > 61, osteoporosis, chronic steroid use) AND [2] still hurts    Answer Assessment - Initial Assessment Questions  1. MECHANISM: \"How did the injury happen? 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 In the process hurt his lower back. 2. ONSET: \"When did the injury happen? \" (Minutes or hours ago)      - Occurred around 2230 on 7/6/21.     3. LOCATION: \"What part of the back is injured? \"      - Lower back. 4. SEVERITY: \"Can you move the back normally? \"      - Can move the back normally. 5. PAIN: \"Is there any pain? \" If so, ask: \"How bad is the pain? \"   (Scale 1-10; or mild, moderate, severe)      - Current pain level is 6/10.     6. CORD SYMPTOMS: Any weakness or numbness of the arms or legs? \"      - Denies weakness or numbness in arms or legs. 7. SIZE: For cuts, bruises, or swelling, ask: \"How large is it? \" (e.g., inches or centimeters)      - Denies any open areas at all. 8. TETANUS: For any breaks in the skin, ask: \"When was the last tetanus booster? \"      - No breaks in the skin. 9. OTHER SYMPTOMS: \"Do you have any other symptoms? \" (e.g., abdominal pain, blood in urine)      - Denies any other symptoms at this time.     Protocols used: BACK INJURY-ADULT-

## 2021-07-07 NOTE — ED PROVIDER NOTES
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 Sudden onset low back pain after lifting a heavy patient at work at [address]   (Location/Symptom, Timing/low back pain,Context/Setting, Quality, Duration, Modifying Factors, Severity)  Note limiting factors. Jane Hester is a 61 y.o. male who presents to the emergency department with complaint of sudden onset low back pain after lifting a 600 pound patient. Incident happened just prior to presentation. Pain is 8 in a scale of 1-10 and worse with weightbearing and ambulation. Pain does not radiate. Pain is sharp. Comorbid conditions include gynecomastia, hypogonadism, hypertension, hyperaldosteronism, hypothyroidism, hypokalemia. HPI    Nursing Notes were reviewed. REVIEW OF SYSTEMS    (2-9 systems for level 4, 10 or more for level 5)     Review of Systems   Constitutional: Negative. Negative for activity change, appetite change, chills, fatigue and fever. HENT: Negative for congestion, ear discharge, ear pain, hearing loss, rhinorrhea, sinus pressure and sore throat. Eyes: Negative for photophobia, pain and visual disturbance. Respiratory: Negative for apnea, cough, shortness of breath and wheezing. Cardiovascular: Negative for chest pain, palpitations and leg swelling. Gastrointestinal: Negative for abdominal distention, abdominal pain, constipation, diarrhea, nausea and vomiting. Endocrine: Negative for cold intolerance, heat intolerance and polyuria. Genitourinary: Negative for dysuria, flank pain, frequency and urgency. Musculoskeletal: Positive for arthralgias, back pain and myalgias.  Negative for gait problem and neck stiffness. Skin: Negative for color change, pallor and rash. Allergic/Immunologic: Negative for food allergies and immunocompromised state. Neurological: Negative for dizziness, tremors, syncope, weakness, light-headedness and headaches. Psychiatric/Behavioral: Negative for agitation, confusion and hallucinations. All other systems reviewed and are negative. Except as noted above the remainder of the review of systems was reviewed and negative.        PAST MEDICAL HISTORY     Past Medical History:   Diagnosis Date    Gynecomastia     History of hypokalemia     Hyperaldosteronism, unspecified ([facility] [address]     Hypertension     Hypogonadism male     Hypokalemia     Hypothyroidism     Osteoarthritis     Type II or unspecified type diabetes mellitus without mention of complication, not stated as uncontrolled          SURGICAL HISTORY       Past Surgical History:   Procedure Laterality Date    JOINT REPLACEMENT      left quadracep tendon rupture repair w/ anchors         CURRENT MEDICATIONS       Discharge Medication List as of 7/7/2021  6:34 AM      CONTINUE these medications which have NOT CHANGED    Details   eplerenone (INSPRA) 50 MG tablet TAKE 1 TABLET BY MOUTH 2 TIMES A DAY, Disp-180 tablet, R-0Normal      atenolol-chlorthalidone (TENORETIC) 100-25 MG per tablet Take 1 tablet by mouth daily, Disp-90 tablet,R-3Normal      telmisartan (MICARDIS) 80 MG tablet TAKE 1 TABLET BY MOUTH ONE TIME A DAY, Disp-90 tablet,R-3Normal      sitaGLIPtan-metformin (JANUMET)  MG per tablet TAKE 1 TABLET BY MOUTH 2 TIMES A DAY WITH MEALS, Disp-180 tablet,R-3Normal      tamoxifen (NOLVADEX) 10 MG tablet TAKE 1 TABLET BY MOUTH 2 TIMES A DAY, Disp-180 tablet,R-3Normal      potassium chloride (KLOR-CON) 10 MEQ extended release tablet Take 1 tablet by mouth daily, Disp-90 tablet,R-3Normal      levothyroxine (SYNTHROID) 125 MCG tablet Take 1 tablet by mouth daily, Disp-90 tablet,R-03Normal amLODIPine (NORVASC) 5 MG tablet Take 1 tablet by mouth daily, Disp-90 tablet,R-3Normal      Compression Stockings MISC Starting Mon 4/15/2019, Disp-1 each, R-0, PrintThigh- high 30-40mmhg Dx 187.8; 183.31      zoster recombinant adjuvanted vaccine (SHINGRIX) 50 MCG/0.5ML SUSR injection Inject 0.5 mLs into the muscle See Admin Instructions 1 dose now and repeat in 2-6 months, Disp-1 each, R-0Print      Blood Glucose Monitoring Suppl (AGAMATRIX PRESTO) w/Device KIT Disp-1 kit, R-0, NormalPlease give 1 meter dx e11.65      SAFETY LANCETS MISC Disp-100 each, R-3, NormalSafety pro lancets, use one daily      glucose blood VI test strips (FREESTYLE INSULINX TEST) strip Disp-100 strip, R-3, NormalTest once daily             ALLERGIES     Morphine, Invokana [canagliflozin], and Shellfish-derived products    FAMILY HISTORY       Family History   Problem Relation Age of Onset    Heart Disease Mother     Arthritis Mother     Dementia Mother     Depression Mother     Diabetes Father     Arthritis Father     Heart Disease Father           SOCIAL HISTORY       Social History     Socioeconomic History    Marital status: Single     Spouse name: None    Number of children: None    Years of education: None    Highest education level: None   Occupational History    Occupation: resp. therapist   Tobacco Use    Smoking status: Never Smoker    Smokeless tobacco: Never Used   Substance and Sexual Activity    Alcohol use: No     Alcohol/week: 0.0 standard drinks    Drug use: No    Sexual activity: None     Comment: single   Other Topics Concern    None   Social History Narrative    None     Social Determinants of Health     Financial Resource Strain:     Difficulty of Paying Living Expenses:    Food Insecurity:     Worried About Running Out of Food in the Last Year:     Ran Out of Food in the Last Year:    Transportation Needs:     Lack of Transportation (Medical):      Lack of Transportation (Non-Medical): Physical Activity:     Days of Exercise per Week:     Minutes of Exercise per Session:    Stress:     Feeling of Stress :    Social Connections:     Frequency of Communication with Friends and Family:     Frequency of Social Gatherings with Friends and Family:     Attends Gnosticism Services:     Active Member of Clubs or Organizations:     Attends Club or Organization Meetings:     Marital Status:    Intimate Partner Violence:     Fear of Current or Ex-Partner:     Emotionally Abused:     Physically Abused:     Sexually Abused:        SCREENINGS             PHYSICAL EXAM    (up to 7 for level 4, 8 or more for level 5)     ED Triage Vitals   BP Temp Temp src Pulse Resp SpO2 Height Weight   -- -- -- -- -- -- -- --       Physical Exam  Vitals and nursing note reviewed. Constitutional:       General: He is not in acute distress. Appearance: Normal appearance. He is well-developed. He is obese. He is not ill-appearing, toxic-appearing or diaphoretic. HENT:      Head: Normocephalic and atraumatic. Nose: Nose normal. No congestion or rhinorrhea. Mouth/Throat:      Mouth: Mucous membranes are moist.      Pharynx: Oropharynx is clear. No oropharyngeal exudate or posterior oropharyngeal erythema. Eyes:      General: No scleral icterus. Right eye: No discharge. Left eye: No discharge. Extraocular Movements: Extraocular movements intact. Conjunctiva/sclera: Conjunctivae normal.      Pupils: Pupils are equal, round, and reactive to light. Neck:      Thyroid: No thyromegaly. Vascular: No carotid bruit or JVD. Trachea: No tracheal deviation. Cardiovascular:      Rate and Rhythm: Normal rate and regular rhythm. Pulses: Normal pulses. Heart sounds: Normal heart sounds. No murmur heard. No friction rub. No gallop. Pulmonary:      Effort: Pulmonary effort is normal. No respiratory distress. Breath sounds: Normal breath sounds. No stridor.  No wheezing, rhonchi or rales. Chest:      Chest wall: No tenderness. Abdominal:      General: Abdomen is flat. Bowel sounds are normal. There is no distension. Palpations: Abdomen is soft. There is no mass. Tenderness: There is no abdominal tenderness. There is no right CVA tenderness, left CVA tenderness, guarding or rebound. Hernia: No hernia is present. Musculoskeletal:         General: Signs of injury present. No swelling, tenderness or deformity. Normal range of motion. Cervical back: Normal range of motion and neck supple. No rigidity or tenderness. Right lower leg: No edema. Left lower leg: No edema. Comments: Paralumbar spasms and tenderness   Lymphadenopathy:      Cervical: No cervical adenopathy. Skin:     General: Skin is warm and dry. Capillary Refill: Capillary refill takes less than 2 seconds. Coloration: Skin is not jaundiced or pale. Findings: No bruising, erythema, lesion or rash. Neurological:      General: No focal deficit present. Mental Status: He is alert and oriented to person, place, and time. Mental status is at baseline. Cranial Nerves: No cranial nerve deficit. Sensory: No sensory deficit. Motor: No weakness or abnormal muscle tone. Coordination: Coordination normal.      Gait: Gait normal.      Deep Tendon Reflexes: Reflexes are normal and symmetric. Reflexes normal.   Psychiatric:         Mood and Affect: Mood normal.         Behavior: Behavior normal.         Thought Content:  Thought content normal.         Judgment: Judgment normal.         DIAGNOSTIC RESULTS     EKG: All EKG's are interpreted by the Emergency Department Physician who either signs or Co-signs this chart in the absence of a cardiologist.        RADIOLOGY:   Non-plain film images such as CT, Ultrasound and MRI are read by the radiologist. Ferdie Sauce radiographicimages are visualized and preliminarily interpreted by the emergency physician with the below findings:    Lumbar spine x-ray shows no acute fractures. Interpretation per the Radiologist below, if available at the time of this note:    XR LUMBAR SPINE (MIN 4 VIEWS)   Final Result   Facet are arthropathy is seen at L4 and L5 and is most prominent at L5. No acute fracture or dislocation is seen. ED BEDSIDE ULTRASOUND:   Performed by ED Physician - none    LABS:  Labs Reviewed - No data to display    All other labs were within normal range or not returned as of this dictation. EMERGENCY DEPARTMENT COURSE and DIFFERENTIALDIAGNOSIS/MDM:   Vitals:    Vitals:    07/07/21 0540   BP: (!) 168/98   Pulse: 64   Resp: 18   Temp: 98.2 °F (36.8 °C)   SpO2: 99%   Weight: 235 lb (106.6 kg)   Height: 6' 2\" (1.88 m)         MDM  Number of Diagnoses or Management Options     Amount and/or Complexity of Data Reviewed  Tests in the radiology section of CPT®: reviewed and ordered    Risk of Complications, Morbidity, and/or Mortality  Presenting problems: moderate  Diagnostic procedures: moderate  Management options: moderate    Patient Progress  Patient progress: improved      CRITICAL CARE TIME   Total Critical Care time was  minutes, excluding separately reportable procedures. There was a high probability of clinically significant/life threatening deterioration in the patient's condition which required my urgentintervention. CONSULTS:  None    PROCEDURES:  Unless otherwise noted below, none     Procedures    FINAL IMPRESSION      1. Lumbosacral strain, initial encounter          DISPOSITION/PLAN   DISPOSITION        PATIENT REFERRED TO:  No follow-up provider specified.     DISCHARGE MEDICATIONS:  Discharge Medication List as of 7/7/2021  6:34 AM      START taking these medications    Details   ketorolac (TORADOL) 10 MG tablet Take 1 tablet by mouth every 6 hours as needed for Pain, Disp-20 tablet, R-0Normal      orphenadrine (NORFLEX) 100 MG extended release tablet Take 1 tablet by mouth 2 times daily for 10 days, Disp-20 tablet, R-0Normal                (Please note that portions of this note were completed with a voice recognitionprogram.  Efforts were made to edit the dictations but occasionally words are mis-transcribed.)    Efrain Grider MD (electronically signed)  Attending Emergency Physician          Efrain Grider MD  07/10/21 5612

## 2021-07-12 DIAGNOSIS — E03.9 HYPOTHYROIDISM, UNSPECIFIED TYPE: ICD-10-CM

## 2021-07-13 RX ORDER — LEVOTHYROXINE SODIUM 0.12 MG/1
TABLET ORAL
Qty: 90 TABLET | Refills: 3 | Status: SHIPPED | OUTPATIENT
Start: 2021-07-13 | End: 2022-04-21 | Stop reason: SDUPTHER

## 2021-08-02 ENCOUNTER — OFFICE VISIT (OUTPATIENT)
Dept: FAMILY MEDICINE CLINIC | Age: 61
End: 2021-08-02
Payer: COMMERCIAL

## 2021-08-02 VITALS
SYSTOLIC BLOOD PRESSURE: 124 MMHG | BODY MASS INDEX: 31.97 KG/M2 | HEIGHT: 72 IN | WEIGHT: 236 LBS | TEMPERATURE: 97.8 F | OXYGEN SATURATION: 97 % | HEART RATE: 69 BPM | DIASTOLIC BLOOD PRESSURE: 78 MMHG

## 2021-08-02 DIAGNOSIS — M72.2 PLANTAR FASCIITIS: Primary | ICD-10-CM

## 2021-08-02 DIAGNOSIS — M79.671 RIGHT FOOT PAIN: ICD-10-CM

## 2021-08-02 PROCEDURE — 99213 OFFICE O/P EST LOW 20 MIN: CPT | Performed by: PHYSICIAN ASSISTANT

## 2021-08-02 RX ORDER — KETOROLAC TROMETHAMINE 10 MG/1
10 TABLET, FILM COATED ORAL EVERY 6 HOURS PRN
Qty: 40 TABLET | Refills: 0 | Status: SHIPPED | OUTPATIENT
Start: 2021-08-02 | End: 2022-04-21 | Stop reason: CLARIF

## 2021-08-02 RX ORDER — METHYLPREDNISOLONE 4 MG/1
TABLET ORAL
Qty: 1 TABLET | Refills: 1 | Status: SHIPPED | OUTPATIENT
Start: 2021-08-02 | End: 2021-08-08

## 2021-08-02 SDOH — ECONOMIC STABILITY: FOOD INSECURITY: WITHIN THE PAST 12 MONTHS, THE FOOD YOU BOUGHT JUST DIDN'T LAST AND YOU DIDN'T HAVE MONEY TO GET MORE.: NEVER TRUE

## 2021-08-02 SDOH — ECONOMIC STABILITY: FOOD INSECURITY: WITHIN THE PAST 12 MONTHS, YOU WORRIED THAT YOUR FOOD WOULD RUN OUT BEFORE YOU GOT MONEY TO BUY MORE.: NEVER TRUE

## 2021-08-02 ASSESSMENT — ENCOUNTER SYMPTOMS
COUGH: 0
CHEST TIGHTNESS: 0
ABDOMINAL PAIN: 0
BACK PAIN: 0
TROUBLE SWALLOWING: 0
SINUS PRESSURE: 0
DIARRHEA: 0
VOMITING: 0
SHORTNESS OF BREATH: 0

## 2021-08-02 ASSESSMENT — PATIENT HEALTH QUESTIONNAIRE - PHQ9
SUM OF ALL RESPONSES TO PHQ9 QUESTIONS 1 & 2: 0
1. LITTLE INTEREST OR PLEASURE IN DOING THINGS: 0
SUM OF ALL RESPONSES TO PHQ QUESTIONS 1-9: 0
2. FEELING DOWN, DEPRESSED OR HOPELESS: 0
SUM OF ALL RESPONSES TO PHQ QUESTIONS 1-9: 0
SUM OF ALL RESPONSES TO PHQ QUESTIONS 1-9: 0

## 2021-08-02 ASSESSMENT — SOCIAL DETERMINANTS OF HEALTH (SDOH): HOW HARD IS IT FOR YOU TO PAY FOR THE VERY BASICS LIKE FOOD, HOUSING, MEDICAL CARE, AND HEATING?: NOT HARD AT ALL

## 2021-08-02 NOTE — PATIENT INSTRUCTIONS
Patient Education        Plantar Fasciitis: Exercises  Introduction  Here are some examples of exercises for you to try. The exercises may be suggested for a condition or for rehabilitation. Start each exercise slowly. Ease off the exercises if you start to have pain. You will be told when to start these exercises and which ones will work best for you. How to do the exercises  Towel stretch   1. Sit with your legs extended and knees straight. 2. Place a towel around your foot just under the toes. 3. Hold each end of the towel in each hand, with your hands above your knees. 4. Pull back with the towel so that your foot stretches toward you. 5. Hold the position for at least 15 to 30 seconds. 6. Repeat 2 to 4 times a session, up to 5 sessions a day. Calf stretch   This exercise stretches the muscles at the back of the lower leg (the calf) and the Achilles tendon. Do this exercise 3 or 4 times a day, 5 days a week. 1. Stand facing a wall with your hands on the wall at about eye level. Put the leg you want to stretch about a step behind your other leg. 2. Keeping your back heel on the floor, bend your front knee until you feel a stretch in the back leg. 3. Hold the stretch for 15 to 30 seconds. Repeat 2 to 4 times. Plantar fascia and calf stretch   Stretching the plantar fascia and calf muscles can increase flexibility and decrease heel pain. You can do this exercise several times each day and before and after activity. 1. Stand on a step as shown above. Be sure to hold on to the banister. 2. Slowly let your heels down over the edge of the step as you relax your calf muscles. You should feel a gentle stretch across the bottom of your foot and up the back of your leg to your knee. 3. Hold the stretch about 15 to 30 seconds, and then tighten your calf muscle a little to bring your heel back up to the level of the step. Repeat 2 to 4 times.     Towel curls   Make this exercise more challenging by placing a weighted object, such as a soup can, on the other end of the towel. 1. While sitting, place your foot on a towel on the floor and scrunch the towel toward you with your toes. 2. Then, also using your toes, push the towel away from you. [place] pickups   1. Put marbles on the floor next to a cup.  2. Using your toes, try to lift the marbles up from the floor and put them in the cup. Follow-up care is a key part of your treatment and safety. Be sure to make and go to all appointments, and call your doctor if you are having problems. It's also a good idea to know your test results and keep a list of the medicines you take. Where can you learn more? Go to https://[employer]attboteb.[employer]. org and sign in to your [employer] account. Enter J319 in the YouFig box to learn more about \"Plantar Fasciitis: Exercises. \"     If you do not have an account, please click on the \"Sign Up Now\" link. Current as of: November 16, 2020               Content Version: 12.9  © 5643-5976 [employer], [employer]. Care instructions adapted under license by [facility] ([facility]). If you have questions about a medical condition or this instruction, always ask your healthcare professional. [address] any warranty or liability for your use of this information.

## 2021-08-02 NOTE — PROGRESS NOTES
Subjective:      Patient ID: Kacey Castro is a 61 y.o. male who presents today for:  Chief Complaint   Patient presents with    Foot Pain     Patient is here c/o R foot pain. Pt states he has inflammation of joints and swelling in R foot. Pt states swelling has been ongoing for about 4 days, describes pain as sharp and dull, depending on how he steps with the foot. Pt states he has used ice compresses and OTC medication with minimal relief. HPI  61year old male who presents with complaints of right foot. Has pain and swelling of the dorsum of the the foot radiating to the lateral malleolus and then to the medial malleolus. He has had multiple flare ups of this. States that the pain 5/10 while at rest. The pain is worse with ambulation. Known history of plantar fascitis. Past Medical History:   Diagnosis Date    Gynecomastia     History of hypokalemia     Hyperaldosteronism, unspecified ([facility] [address]     Hypertension     Hypogonadism male     Hypokalemia     Hypothyroidism     Osteoarthritis     Type II or unspecified type diabetes mellitus without mention of complication, not stated as uncontrolled      Past Surgical History:   Procedure Laterality Date    JOINT REPLACEMENT      left quadracep tendon rupture repair w/ anchors     Social History     Socioeconomic History    Marital status: Single     Spouse name: Not on file    Number of children: Not on file    Years of education: Not on file    Highest education level: Not on file   Occupational History    Occupation: resp.  therapist   Tobacco Use    Smoking status: Never Smoker    Smokeless tobacco: Never Used   Substance and Sexual Activity    Alcohol use: No     Alcohol/week: 0.0 standard drinks    Drug use: No    Sexual activity: Not on file     Comment: single   Other Topics Concern    Not on file   Social History Narrative    Not on file     Social Determinants of Health     Financial Resource Strain: Low Risk     Difficulty of Paying Living Expenses: Not hard at all   Food Insecurity: No Food Insecurity    Worried About Running Out of Food in the Last Year: Never true    Ran Out of Food in the Last Year: Never true   Transportation Needs:     Lack of Transportation (Medical):  Lack of Transportation (Non-Medical):    Physical Activity:     Days of Exercise per Week:     Minutes of Exercise per Session:    Stress:     Feeling of Stress :    Social Connections:     Frequency of Communication with Friends and Family:     Frequency of Social Gatherings with Friends and Family:     Attends [employer] Services:     Active Member of Clubs or Organizations:     Attends Club or Organization Meetings:     Marital Status:    Intimate Partner Violence:     Fear of Current or Ex-Partner:     Emotionally Abused:     Physically Abused:     Sexually Abused:      Family History   Problem Relation Age of Onset    Heart Disease Mother     Arthritis Mother     Dementia Mother     Depression Mother     Diabetes Father     Arthritis Father     Heart Disease Father      Allergies   Allergen Reactions    Morphine     Invokana [Canagliflozin]      UTI and yeast infection     Shellfish-Derived Products Swelling     Current Outpatient Medications   Medication Sig Dispense Refill    ketorolac (TORADOL) 10 MG tablet Take 1 tablet by mouth every 6 hours as needed for Pain 40 tablet 0    methylPREDNISolone (MEDROL DOSEPACK) 4 MG tablet Take by mouth.  1 tablet 1    levothyroxine (SYNTHROID) 125 MCG tablet TAKE 1 TABLET BY MOUTH ONE TIME A DAY 90 tablet 3    ketorolac (TORADOL) 10 MG tablet Take 1 tablet by mouth every 6 hours as needed for Pain 20 tablet 0    eplerenone (INSPRA) 50 MG tablet TAKE 1 TABLET BY MOUTH 2 TIMES A  tablet 0    atenolol-chlorthalidone (TENORETIC) 100-25 MG per tablet Take 1 tablet by mouth daily 90 tablet 3    telmisartan (MICARDIS) 80 MG tablet TAKE 1 TABLET BY MOUTH ONE TIME A DAY 90 tablet 3    sitaGLIPtan-metformin (JANUMET)  MG per tablet TAKE 1 TABLET BY MOUTH 2 TIMES A DAY WITH MEALS 180 tablet 3    tamoxifen (NOLVADEX) 10 MG tablet TAKE 1 TABLET BY MOUTH 2 TIMES A  tablet 3    potassium chloride (KLOR-CON) 10 MEQ extended release tablet Take 1 tablet by mouth daily 90 tablet 3    amLODIPine (NORVASC) 5 MG tablet Take 1 tablet by mouth daily 90 tablet 3    Compression Stockings MISC by Does not apply route Thigh- high 30-40mmhg  Dx 187.8; 183.31 1 each 0    zoster recombinant adjuvanted vaccine (SHINGRIX) 50 MCG/0.5ML SUSR injection Inject 0.5 mLs into the muscle See Admin Instructions 1 dose now and repeat in 2-6 months 1 each 0    Blood Glucose Monitoring Suppl (AGAMATRIX PRESTO) w/Device KIT Please give 1 meter dx e11.65 1 kit 0    SAFETY LANCETS MISC Safety pro lancets, use one daily 100 each 3    glucose blood VI test strips (FREESTYLE INSULINX TEST) strip Test once daily 100 strip 3     No current facility-administered medications for this visit. Review of Systems   Constitutional: Negative for activity change, appetite change, chills, fever and unexpected weight change. HENT: Negative for drooling, ear pain, nosebleeds, sinus pressure and trouble swallowing. Respiratory: Negative for cough, chest tightness and shortness of breath. Cardiovascular: Negative for chest pain and leg swelling. Gastrointestinal: Negative for abdominal pain, diarrhea and vomiting. Endocrine: Negative for polydipsia and polyphagia. Genitourinary: Negative for dysuria, flank pain and frequency. Musculoskeletal: Positive for myalgias (right foot). Negative for back pain. Skin: Negative for pallor and rash. Neurological: Negative for syncope, weakness and headaches. Hematological: Does not bruise/bleed easily. Psychiatric/Behavioral: Negative for agitation, behavioral problems and confusion. All other systems reviewed and are negative.       Objective:   /78 (Site: Left Upper Arm, Position: Sitting, Cuff Size: Large Adult)   Pulse 69   Temp 97.8 °F (36.6 °C)   Ht 6' (1.829 m)   Wt 236 lb (107 kg)   SpO2 97%   BMI 32.01 kg/m²     Physical Exam  Vitals and nursing note reviewed. Constitutional:       General: He is awake. He is not in acute distress. Appearance: Normal appearance. He is well-developed and normal weight. He is not ill-appearing, toxic-appearing or diaphoretic. Comments: No photophobia. No phonophobia. HENT:      Head: Normocephalic and atraumatic. No Pal's sign. Right Ear: External ear normal.      Left Ear: External ear normal.      Nose: Nose normal. No congestion or rhinorrhea. Mouth/Throat:      Mouth: Mucous membranes are moist.      Pharynx: Oropharynx is clear. No oropharyngeal exudate or posterior oropharyngeal erythema. Eyes:      General: No scleral icterus. Right eye: No foreign body or discharge. Left eye: No discharge. Extraocular Movements: Extraocular movements intact. Conjunctiva/sclera: Conjunctivae normal.      Left eye: No exudate. Pupils: Pupils are equal, round, and reactive to light. Neck:      Vascular: No JVD. Trachea: No tracheal deviation. Comments: No meningismus. Cardiovascular:      Rate and Rhythm: Normal rate and regular rhythm. Heart sounds: Normal heart sounds. Heart sounds not distant. No murmur heard. No friction rub. No gallop. Pulmonary:      Effort: Pulmonary effort is normal. No respiratory distress. Breath sounds: Normal breath sounds. No stridor. No wheezing, rhonchi or rales. Chest:      Chest wall: No tenderness. Abdominal:      General: Abdomen is flat. Bowel sounds are normal. There is no distension. Palpations: Abdomen is soft. There is no mass. Tenderness: There is no abdominal tenderness. There is no right CVA tenderness, left CVA tenderness, guarding or rebound. Hernia: No hernia is present. Musculoskeletal:         General: Swelling (right foot ) present. No tenderness, deformity or signs of injury. Normal range of motion. Cervical back: Normal range of motion and neck supple. No rigidity. Lymphadenopathy:      Head:      Right side of head: No submental adenopathy. Left side of head: No submental adenopathy. Skin:     General: Skin is warm and dry. Capillary Refill: Capillary refill takes less than 2 seconds. Coloration: Skin is not jaundiced or pale. Findings: No bruising, erythema, lesion or rash. Neurological:      General: No focal deficit present. Mental Status: He is alert and oriented to person, place, and time. Mental status is at baseline. Cranial Nerves: No cranial nerve deficit. Sensory: No sensory deficit. Motor: No weakness. Coordination: Coordination normal.      Deep Tendon Reflexes: Reflexes are normal and symmetric. Psychiatric:         Mood and Affect: Mood normal.         Behavior: Behavior normal. Behavior is cooperative. Thought Content: Thought content normal.         Judgment: Judgment normal.         Assessment:       Diagnosis Orders   1. Plantar fasciitis  ketorolac (TORADOL) 10 MG tablet    methylPREDNISolone (MEDROL DOSEPACK) 4 MG tablet    Amb External Referral To Podiatry   2. Right foot pain  ketorolac (TORADOL) 10 MG tablet    methylPREDNISolone (MEDROL DOSEPACK) 4 MG tablet    Amb External Referral To Podiatry     No results found for this visit on 08/02/21. Plan:     Assessment & Plan   Byron Smith was seen today for foot pain. Diagnoses and all orders for this visit:    Plantar fasciitis  -     ketorolac (TORADOL) 10 MG tablet; Take 1 tablet by mouth every 6 hours as needed for Pain  -     methylPREDNISolone (MEDROL DOSEPACK) 4 MG tablet; Take by mouth. -     Amb External Referral To Podiatry    Right foot pain  -     ketorolac (TORADOL) 10 MG tablet;  Take 1 tablet by mouth every 6 hours as needed for Pain  -     methylPREDNISolone (MEDROL DOSEPACK) 4 MG tablet; Take by mouth. -     Amb External Referral To Podiatry      Orders Placed This Encounter   Procedures    Amb External Referral To Podiatry     Referral Priority:   Routine     Referral Type:   Eval and Treat     Referral Reason:   Specialty Services Required     Referred to Provider:   Marianna Saez DPM     Requested Specialty:   Podiatry     Number of Visits Requested:   1     Orders Placed This Encounter   Medications    ketorolac (TORADOL) 10 MG tablet     Sig: Take 1 tablet by mouth every 6 hours as needed for Pain     Dispense:  40 tablet     Refill:  0    methylPREDNISolone (MEDROL DOSEPACK) 4 MG tablet     Sig: Take by mouth. Dispense:  1 tablet     Refill:  1     There are no discontinued medications. Return if symptoms worsen or fail to improve. Reviewed with the patient/family: current clinical status & medications. Side effects of the medication prescribed today, as well as treatment plan/rationale and result expectations have been discussed with the patient/family who expresses understanding. Patient will be discharged home in stable condition. Follow up with PCP to evaluate treatment results or return if symptoms worsen or fail to improve. Discussed signs and symptoms which require immediate follow-up in ED/call to 911. Understanding verbalized. I have reviewed the patient's medical history in detail and updated the computerized patient record.     JONATHAN Martinez

## 2021-08-09 RX ORDER — EPLERENONE 50 MG/1
TABLET, FILM COATED ORAL
Qty: 180 TABLET | Refills: 0 | Status: SHIPPED | OUTPATIENT
Start: 2021-08-09 | End: 2021-09-09 | Stop reason: SDUPTHER

## 2021-08-20 ENCOUNTER — OFFICE VISIT (OUTPATIENT)
Dept: FAMILY MEDICINE CLINIC | Age: 61
End: 2021-08-20
Payer: COMMERCIAL

## 2021-08-20 VITALS
DIASTOLIC BLOOD PRESSURE: 82 MMHG | HEIGHT: 72 IN | OXYGEN SATURATION: 97 % | WEIGHT: 232 LBS | SYSTOLIC BLOOD PRESSURE: 122 MMHG | BODY MASS INDEX: 31.42 KG/M2 | TEMPERATURE: 97.8 F | HEART RATE: 56 BPM

## 2021-08-20 DIAGNOSIS — M25.561 ACUTE PAIN OF RIGHT KNEE: Primary | ICD-10-CM

## 2021-08-20 DIAGNOSIS — M25.561 PAIN AND SWELLING OF RIGHT KNEE: ICD-10-CM

## 2021-08-20 DIAGNOSIS — M25.461 PAIN AND SWELLING OF RIGHT KNEE: ICD-10-CM

## 2021-08-20 PROCEDURE — 99213 OFFICE O/P EST LOW 20 MIN: CPT | Performed by: PHYSICIAN ASSISTANT

## 2021-08-20 RX ORDER — MELOXICAM 15 MG/1
15 TABLET ORAL DAILY
Qty: 30 TABLET | Refills: 3 | Status: SHIPPED | OUTPATIENT
Start: 2021-08-20

## 2021-08-20 RX ORDER — CLOBETASOL PROPIONATE 0.5 MG/G
OINTMENT TOPICAL
Qty: 1 TUBE | Refills: 1 | Status: SHIPPED | OUTPATIENT
Start: 2021-08-20 | End: 2021-10-21 | Stop reason: CLARIF

## 2021-08-20 RX ORDER — DICLOFENAC SODIUM 300 MG/G
100 CREAM TOPICAL 2 TIMES DAILY
Qty: 100 G | Refills: 1 | Status: SHIPPED | OUTPATIENT
Start: 2021-08-20 | End: 2021-08-20 | Stop reason: CLARIF

## 2021-08-20 RX ORDER — DICLOFENAC SODIUM 30 MG/G
100 GEL TOPICAL 2 TIMES DAILY
Qty: 100 G | Refills: 1 | Status: SHIPPED | OUTPATIENT
Start: 2021-08-20 | End: 2021-08-30

## 2021-08-20 ASSESSMENT — ENCOUNTER SYMPTOMS
VOMITING: 0
ABDOMINAL PAIN: 0
SINUS PRESSURE: 0
SHORTNESS OF BREATH: 0
BACK PAIN: 0
TROUBLE SWALLOWING: 0
COUGH: 0
DIARRHEA: 0
CHEST TIGHTNESS: 0

## 2021-08-20 NOTE — PROGRESS NOTES
Subjective:      Patient ID: Oni Eason is a 61 y.o. male who presents today for:  Chief Complaint   Patient presents with    Knee Pain     Patient is here c/o knee pain. Pt states pain is located in R knee, doesn't radiate. Pt states pain is manageable when sitting, increases when weight is put on it. Pt states pain has been ongoing for about 1 week, describes pain as dull when resting, stabbing when moving. HPI  61year old male who presents with complaints of right knee pain since this morning. The patient's right knee is swollen and painful with ambulation. State that the pain has lasted for the past week. describes the as 2/10 when sitting and 8/10 with weigh bearing. Past Medical History:   Diagnosis Date    Gynecomastia     History of hypokalemia     Hyperaldosteronism, unspecified ([facility] [address]     Hypertension     Hypogonadism male     Hypokalemia     Hypothyroidism     Osteoarthritis     Type II or unspecified type diabetes mellitus without mention of complication, not stated as uncontrolled      Past Surgical History:   Procedure Laterality Date    JOINT REPLACEMENT      left quadracep tendon rupture repair w/ anchors     Social History     Socioeconomic History    Marital status: Single     Spouse name: Not on file    Number of children: Not on file    Years of education: Not on file    Highest education level: Not on file   Occupational History    Occupation: resp.  therapist   Tobacco Use    Smoking status: Never Smoker    Smokeless tobacco: Never Used   Substance and Sexual Activity    Alcohol use: No     Alcohol/week: 0.0 standard drinks    Drug use: No    Sexual activity: Not on file     Comment: single   Other Topics Concern    Not on file   Social History Narrative    Not on file     Social Determinants of Health     Financial Resource Strain: Low Risk     Difficulty of Paying Living Expenses: Not hard at all   Food Insecurity: No Food Insecurity    Worried About Running Out of Food in the Last Year: Never true    Ran Out of Food in the Last Year: Never true   Transportation Needs:     Lack of Transportation (Medical):  Lack of Transportation (Non-Medical):    Physical Activity:     Days of Exercise per Week:     Minutes of Exercise per Session:    Stress:     Feeling of Stress :    Social Connections:     Frequency of Communication with Friends and Family:     Frequency of Social Gatherings with Friends and Family:     Attends [employer] Services:     Active Member of Clubs or Organizations:     Attends Club or Organization Meetings:     Marital Status:    Intimate Partner Violence:     Fear of Current or Ex-Partner:     Emotionally Abused:     Physically Abused:     Sexually Abused:      Family History   Problem Relation Age of Onset    Heart Disease Mother     Arthritis Mother     Dementia Mother     Depression Mother     Diabetes Father     Arthritis Father     Heart Disease Father      Allergies   Allergen Reactions    Morphine     Invokana [Canagliflozin]      UTI and yeast infection     Shellfish-Derived Products Swelling     Current Outpatient Medications   Medication Sig Dispense Refill    meloxicam (MOBIC) 15 MG tablet Take 1 tablet by mouth daily 30 tablet 3    Diclofenac Sodium 3 % CREA Apply 100 g topically 2 times daily 100 g 1    clobetasol (TEMOVATE) 0.05 % ointment Apply topically 2 times daily.  1 Tube 1    eplerenone (INSPRA) 50 MG tablet TAKE 1 TABLET BY MOUTH 2 TIMES A  tablet 0    levothyroxine (SYNTHROID) 125 MCG tablet TAKE 1 TABLET BY MOUTH ONE TIME A DAY 90 tablet 3    ketorolac (TORADOL) 10 MG tablet Take 1 tablet by mouth every 6 hours as needed for Pain 20 tablet 0    atenolol-chlorthalidone (TENORETIC) 100-25 MG per tablet Take 1 tablet by mouth daily 90 tablet 3    telmisartan (MICARDIS) 80 MG tablet TAKE 1 TABLET BY MOUTH ONE TIME A DAY 90 tablet 3    sitaGLIPtan-metformin (JANUMET)  MG per tablet TAKE 1 TABLET BY MOUTH 2 TIMES A DAY WITH MEALS 180 tablet 3    tamoxifen (NOLVADEX) 10 MG tablet TAKE 1 TABLET BY MOUTH 2 TIMES A  tablet 3    potassium chloride (KLOR-CON) 10 MEQ extended release tablet Take 1 tablet by mouth daily 90 tablet 3    amLODIPine (NORVASC) 5 MG tablet Take 1 tablet by mouth daily 90 tablet 3    Compression Stockings MISC by Does not apply route Thigh- high 30-40mmhg  Dx 187.8; 183.31 1 each 0    zoster recombinant adjuvanted vaccine (SHINGRIX) 50 MCG/0.5ML SUSR injection Inject 0.5 mLs into the muscle See Admin Instructions 1 dose now and repeat in 2-6 months 1 each 0    Blood Glucose Monitoring Suppl (moziy) w/Device KIT Please give 1 meter dx e11.65 1 kit 0    SAFETY LANCETS MISC Safety pro lancets, use one daily 100 each 3    glucose blood VI test strips (FREESTYLE INSULINX TEST) strip Test once daily 100 strip 3    ketorolac (TORADOL) 10 MG tablet Take 1 tablet by mouth every 6 hours as needed for Pain 40 tablet 0     No current facility-administered medications for this visit. Review of Systems   Constitutional: Negative for activity change, appetite change, chills, fever and unexpected weight change. HENT: Negative for drooling, ear pain, nosebleeds, sinus pressure and trouble swallowing. Respiratory: Negative for cough, chest tightness and shortness of breath. Cardiovascular: Negative for chest pain and leg swelling. Gastrointestinal: Negative for abdominal pain, diarrhea and vomiting. Endocrine: Negative for polydipsia and polyphagia. Genitourinary: Negative for dysuria, flank pain and frequency. Musculoskeletal: Positive for arthralgias (right knee) and joint swelling (right knee). Negative for back pain and myalgias. Skin: Negative for pallor and rash. Neurological: Negative for syncope, weakness and headaches. Hematological: Does not bruise/bleed easily.    Psychiatric/Behavioral: Negative for agitation, behavioral problems and confusion. All other systems reviewed and are negative. Objective:   /82 (Site: Left Upper Arm, Position: Sitting, Cuff Size: Large Adult)   Pulse 56   Temp 97.8 °F (36.6 °C)   Ht 6' (1.829 m)   Wt 232 lb (105.2 kg)   SpO2 97%   BMI 31.46 kg/m²     Physical Exam  Vitals and nursing note reviewed. Constitutional:       General: He is awake. He is not in acute distress. Appearance: Normal appearance. He is well-developed and normal weight. He is not ill-appearing, toxic-appearing or diaphoretic. Comments: No photophobia. No phonophobia. HENT:      Head: Normocephalic and atraumatic. No Pal's sign. Right Ear: External ear normal.      Left Ear: External ear normal.      Nose: Nose normal. No congestion or rhinorrhea. Mouth/Throat:      Mouth: Mucous membranes are moist.      Pharynx: Oropharynx is clear. No oropharyngeal exudate or posterior oropharyngeal erythema. Eyes:      General: No scleral icterus. Right eye: No foreign body or discharge. Left eye: No discharge. Extraocular Movements: Extraocular movements intact. Conjunctiva/sclera: Conjunctivae normal.      Left eye: No exudate. Pupils: Pupils are equal, round, and reactive to light. Neck:      Vascular: No JVD. Trachea: No tracheal deviation. Comments: No meningismus. Cardiovascular:      Rate and Rhythm: Normal rate and regular rhythm. Heart sounds: Normal heart sounds. Heart sounds not distant. No murmur heard. No friction rub. No gallop. Pulmonary:      Effort: Pulmonary effort is normal. No respiratory distress. Breath sounds: Normal breath sounds. No stridor. No wheezing, rhonchi or rales. Chest:      Chest wall: No tenderness. Abdominal:      General: Abdomen is flat. Bowel sounds are normal. There is no distension. Palpations: Abdomen is soft. There is no mass. Tenderness: There is no abdominal tenderness.  There is no right CVA tenderness, left CVA tenderness, guarding or rebound. Hernia: No hernia is present. Musculoskeletal:         General: Swelling (right knee) present. No tenderness, deformity or signs of injury. Normal range of motion. Cervical back: Normal range of motion and neck supple. No rigidity. Lymphadenopathy:      Head:      Right side of head: No submental adenopathy. Left side of head: No submental adenopathy. Skin:     General: Skin is warm and dry. Capillary Refill: Capillary refill takes less than 2 seconds. Coloration: Skin is not jaundiced or pale. Findings: No bruising, erythema, lesion or rash. Neurological:      General: No focal deficit present. Mental Status: He is alert and oriented to person, place, and time. Mental status is at baseline. Cranial Nerves: No cranial nerve deficit. Sensory: No sensory deficit. Motor: No weakness. Coordination: Coordination normal.      Deep Tendon Reflexes: Reflexes are normal and symmetric. Psychiatric:         Mood and Affect: Mood normal.         Behavior: Behavior normal. Behavior is cooperative. Thought Content: Thought content normal.         Judgment: Judgment normal.         Assessment:       Diagnosis Orders   1. Acute pain of right knee  XR KNEE RIGHT (3 VIEWS)    meloxicam (MOBIC) 15 MG tablet    Amb External Referral To Orthopedic Surgery    Diclofenac Sodium 3 % CREA    clobetasol (TEMOVATE) 0.05 % ointment   2. Pain and swelling of right knee  XR KNEE RIGHT (3 VIEWS)    meloxicam (MOBIC) 15 MG tablet    Amb External Referral To Orthopedic Surgery    Diclofenac Sodium 3 % CREA    clobetasol (TEMOVATE) 0.05 % ointment     No results found for this visit on 08/20/21. Plan:     Assessment & Plan   Henrietta Rdz was seen today for knee pain. Diagnoses and all orders for this visit:    Acute pain of right knee  -     XR KNEE RIGHT (3 VIEWS);  Future  -     meloxicam (MOBIC) 15 MG tablet; Take 1 tablet by mouth daily  -     Amb External Referral To Orthopedic Surgery  -     Diclofenac Sodium 3 % CREA; Apply 100 g topically 2 times daily  -     clobetasol (TEMOVATE) 0.05 % ointment; Apply topically 2 times daily. Pain and swelling of right knee  -     XR KNEE RIGHT (3 VIEWS); Future  -     meloxicam (MOBIC) 15 MG tablet; Take 1 tablet by mouth daily  -     Amb External Referral To Orthopedic Surgery  -     Diclofenac Sodium 3 % CREA; Apply 100 g topically 2 times daily  -     clobetasol (TEMOVATE) 0.05 % ointment; Apply topically 2 times daily. Orders Placed This Encounter   Procedures    XR KNEE RIGHT (3 VIEWS)     Standing Status:   Future     Standing Expiration Date:   8/20/2022     Order Specific Question:   Reason for exam:     Answer:   r/o fx    Amb External Referral To Orthopedic Surgery     Referral Priority:   Routine     Referral Type:   Eval and Treat     Referral Reason:   Specialty Services Required     Referred to Provider:   René Marcos MD     Requested Specialty:   Orthopedic Surgery     Number of Visits Requested:   1     Orders Placed This Encounter   Medications    meloxicam (MOBIC) 15 MG tablet     Sig: Take 1 tablet by mouth daily     Dispense:  30 tablet     Refill:  3    Diclofenac Sodium 3 % CREA     Sig: Apply 100 g topically 2 times daily     Dispense:  100 g     Refill:  1    clobetasol (TEMOVATE) 0.05 % ointment     Sig: Apply topically 2 times daily. Dispense:  1 Tube     Refill:  1     There are no discontinued medications. Return if symptoms worsen or fail to improve. Reviewed with the patient/family: current clinical status & medications. Side effects of the medication prescribed today, as well as treatment plan/rationale and result expectations have been discussed with the patient/family who expresses understanding. Patient will be discharged home in stable condition.     Follow up with PCP to evaluate treatment results or return if symptoms worsen or fail to improve. Discussed signs and symptoms which require immediate follow-up in ED/call to 911. Understanding verbalized. I have reviewed the patient's medical history in detail and updated the computerized patient record.     JONATHAN Soto

## 2021-08-20 NOTE — PATIENT INSTRUCTIONS

## 2021-09-01 LAB
CHOLESTEROL, TOTAL: 118 MG/DL (ref 0–199)
GLUCOSE BLD-MCNC: 109 MG/DL (ref 70–99)
HDLC SERPL-MCNC: 31 MG/DL (ref 40–59)
LDL CHOLESTEROL CALCULATED: 60 MG/DL (ref 0–129)
TRIGL SERPL-MCNC: 137 MG/DL (ref 0–150)

## 2021-09-09 ENCOUNTER — OFFICE VISIT (OUTPATIENT)
Dept: ENDOCRINOLOGY | Age: 61
End: 2021-09-09
Payer: COMMERCIAL

## 2021-09-09 VITALS
WEIGHT: 235 LBS | HEIGHT: 72 IN | BODY MASS INDEX: 31.83 KG/M2 | OXYGEN SATURATION: 96 % | SYSTOLIC BLOOD PRESSURE: 149 MMHG | HEART RATE: 56 BPM | DIASTOLIC BLOOD PRESSURE: 87 MMHG

## 2021-09-09 DIAGNOSIS — E03.9 HYPOTHYROIDISM, UNSPECIFIED TYPE: Primary | ICD-10-CM

## 2021-09-09 DIAGNOSIS — E11.8 TYPE 2 DIABETES MELLITUS WITH COMPLICATION (HCC): ICD-10-CM

## 2021-09-09 DIAGNOSIS — N62 GYNECOMASTIA: ICD-10-CM

## 2021-09-09 DIAGNOSIS — E11.65 UNCONTROLLED TYPE 2 DIABETES MELLITUS WITH HYPERGLYCEMIA (HCC): ICD-10-CM

## 2021-09-09 DIAGNOSIS — I10 ESSENTIAL HYPERTENSION: ICD-10-CM

## 2021-09-09 LAB
CHP ED QC CHECK: NORMAL
GLUCOSE BLD-MCNC: 72 MG/DL

## 2021-09-09 PROCEDURE — 82962 GLUCOSE BLOOD TEST: CPT | Performed by: INTERNAL MEDICINE

## 2021-09-09 PROCEDURE — 99213 OFFICE O/P EST LOW 20 MIN: CPT | Performed by: INTERNAL MEDICINE

## 2021-09-09 RX ORDER — EPLERENONE 50 MG/1
TABLET, FILM COATED ORAL
Qty: 180 TABLET | Refills: 3 | Status: SHIPPED | OUTPATIENT
Start: 2021-09-09 | End: 2022-04-21 | Stop reason: SDUPTHER

## 2021-09-09 RX ORDER — AMLODIPINE BESYLATE 5 MG/1
5 TABLET ORAL DAILY
Qty: 90 TABLET | Refills: 3 | Status: SHIPPED | OUTPATIENT
Start: 2021-09-09 | End: 2022-04-21 | Stop reason: SDUPTHER

## 2021-09-09 RX ORDER — POTASSIUM CHLORIDE 750 MG/1
10 TABLET, FILM COATED, EXTENDED RELEASE ORAL DAILY
Qty: 90 TABLET | Refills: 3 | Status: SHIPPED | OUTPATIENT
Start: 2021-09-09 | End: 2021-12-03

## 2021-09-09 RX ORDER — LANCETS 26 GAUGE
EACH MISCELLANEOUS
Qty: 100 EACH | Refills: 3 | Status: SHIPPED | OUTPATIENT
Start: 2021-09-09 | End: 2022-04-21 | Stop reason: SDUPTHER

## 2021-09-09 RX ORDER — TELMISARTAN 80 MG/1
TABLET ORAL
Qty: 90 TABLET | Refills: 3 | Status: SHIPPED | OUTPATIENT
Start: 2021-09-09 | End: 2021-11-16

## 2021-09-09 RX ORDER — SITAGLIPTIN AND METFORMIN HYDROCHLORIDE 500; 50 MG/1; MG/1
TABLET, FILM COATED ORAL
Qty: 180 TABLET | Refills: 3 | Status: SHIPPED | OUTPATIENT
Start: 2021-09-09 | End: 2021-10-11

## 2021-09-09 RX ORDER — BLOOD SUGAR DIAGNOSTIC
STRIP MISCELLANEOUS
Qty: 100 STRIP | Refills: 3 | Status: SHIPPED | OUTPATIENT
Start: 2021-09-09 | End: 2022-04-21 | Stop reason: SDUPTHER

## 2021-09-09 RX ORDER — TAMOXIFEN CITRATE 10 MG/1
TABLET ORAL
Qty: 180 TABLET | Refills: 3 | Status: SHIPPED | OUTPATIENT
Start: 2021-09-09 | End: 2021-11-16

## 2021-09-09 NOTE — PROGRESS NOTES
9/9/2021    Assessment:       Diagnosis Orders   1. Hypothyroidism, unspecified type  T4, Free    TSH with Reflex    Safety Lancets MISC    blood glucose test strips (FREESTYLE INSULINX TEST) strip   2. Uncontrolled type 2 diabetes mellitus with hyperglycemia (HCC)  POCT Glucose    Hemoglobin A8W    Basic Metabolic Panel, Fasting    Microalbumin / Creatinine Urine Ratio    HM DIABETES FOOT EXAM    Lipid, Fasting    sitaGLIPtan-metFORMIN (JANUMET)  MG per tablet   3. Type 2 diabetes mellitus with complication (HCC)  Safety Lancets MISC    blood glucose test strips (FREESTYLE INSULINX TEST) strip   4.  Gynecomastia  Safety Lancets MISC    blood glucose test strips (FREESTYLE INSULINX TEST) strip         PLAN:     Orders Placed This Encounter   Procedures    T4, Free     Standing Status:   Future     Standing Expiration Date:   9/9/2022    TSH with Reflex     Standing Status:   Future     Standing Expiration Date:   9/9/2022    Hemoglobin A1C     Standing Status:   Future     Standing Expiration Date:   9/9/2022    Basic Metabolic Panel, Fasting     Standing Status:   Future     Standing Expiration Date:   9/9/2022    Microalbumin / Creatinine Urine Ratio     Standing Status:   Future     Standing Expiration Date:   9/9/2022    Lipid, Fasting     Standing Status:   Future     Standing Expiration Date:   9/9/2022    POCT Glucose     DIABETES FOOT EXAM     Orders Placed This Encounter   Medications    sitaGLIPtan-metFORMIN (JANUMET)  MG per tablet     Sig: TAKE 1 TABLET BY MOUTH 2 TIMES A DAY WITH MEALS     Dispense:  180 tablet     Refill:  3    Safety Lancets MISC     Sig: Safety pro lancets, use one daily     Dispense:  100 each     Refill:  3    blood glucose test strips (FREESTYLE INSULINX TEST) strip     Sig: Test once daily     Dispense:  100 strip     Refill:  3    eplerenone (INSPRA) 50 MG tablet     Sig: bid     Dispense:  180 tablet     Refill:  3    tamoxifen (NOLVADEX) 10 MG tablet Sig: TAKE 1 TABLET BY MOUTH 2 TIMES A DAY     Dispense:  180 tablet     Refill:  3    potassium chloride (KLOR-CON) 10 MEQ extended release tablet     Sig: Take 1 tablet by mouth daily     Dispense:  90 tablet     Refill:  3    amLODIPine (NORVASC) 5 MG tablet     Sig: Take 1 tablet by mouth daily     Dispense:  90 tablet     Refill:  3    telmisartan (MICARDIS) 80 MG tablet     Sig: TAKE 1 TABLET BY MOUTH ONE TIME A DAY     Dispense:  90 tablet     Refill:  3     Continue current dose of Janumet Synthroid blood pressure medications follow-up in 3 to 6 months      Orders Placed This Encounter   Procedures    POCT Glucose       Subjective:     Chief Complaint   Patient presents with    Hypothyroidism    Diabetes     Vitals:    09/09/21 0951 09/09/21 0955   BP: (!) 154/88 (!) 149/87   Pulse: 56    SpO2: 96%    Weight: 235 lb (106.6 kg)    Height: 6' (1.829 m)      Wt Readings from Last 3 Encounters:   09/09/21 235 lb (106.6 kg)   08/20/21 232 lb (105.2 kg)   08/02/21 236 lb (107 kg)     BP Readings from Last 3 Encounters:   09/09/21 (!) 149/87   08/20/21 122/82   08/02/21 124/78     Patient here for follow-up on type 2 diabetes hypertension hypothyroidism  For diabetes patient on Janumet  twice daily  Hypothyroidism Synthroid 125 mcg daily  History of gynecomastia history of hypogonadism patient not on testosterone replacement  Requesting refills labs were reviewed recently    Diabetes  He presents for his follow-up diabetic visit. He has type 2 diabetes mellitus. His disease course has been stable. His overall blood glucose range is 130-140 mg/dl. (Lab Results       Component                Value               Date                       LABA1C                   6.4 (H)             08/12/2021              ) An ACE inhibitor/angiotensin II receptor blocker is being taken. Results for Tiffanie Ortiz (MRN [id number]) as of 9/9/2021 10:28   Ref.  Range 8/12/2021 10:37 8/12/2021 12:12 8/20/2021 11:32 9/1/2021 07:20 9/9/2021 09:54   Sodium Latest Ref Range: 135 - 144 mEq/L 136       Potassium Latest Ref Range: 3.4 - 4.9 mEq/L 3.2 (L)       Chloride Latest Ref Range: 95 - 107 mEq/L 97       CO2 Latest Ref Range: 20 - 31 mEq/L 25       BUN Latest Ref Range: 8 - 23 mg/dL 16       Creatinine Latest Ref Range: 0.70 - 1.20 mg/dL 0.84       Anion Gap Latest Ref Range: 9 - 15 mEq/L 14       GFR Non- Latest Ref Range: >60  >60.0       GFR African American Latest Ref Range: >60  >60.0       Glucose Latest Units: mg/dL 180 (H)   109 (H) 72   Calcium Latest Ref Range: 8.5 - 9.9 mg/dL 9.3       Cholesterol, Total Latest Ref Range: 0 - 199 mg/dL    118    HDL Cholesterol Latest Ref Range: 40 - 59 mg/dL    31 (L)    LDL Calculated Latest Ref Range: 0 - 129 mg/dL    60    Triglycerides Latest Ref Range: 0 - 150 mg/dL    137    ALT Latest Ref Range: 0 - 41 U/L  38      AST Latest Ref Range: 0 - 40 U/L  26      Hemoglobin A1C Latest Ref Range: 4.8 - 5.9 % 6.4 (H)       TSH Latest Ref Range: 0.440 - 3.860 uIU/mL 1.350       T4 Free Latest Ref Range: 0.84 - 1.68 ng/dL 1.32             Past Medical History:   Diagnosis Date    Gynecomastia     History of hypokalemia     Hyperaldosteronism, unspecified ([facility] [address]     Hypertension     Hypogonadism male     Hypokalemia     Hypothyroidism     Osteoarthritis     Type II or unspecified type diabetes mellitus without mention of complication, not stated as uncontrolled      Past Surgical History:   Procedure Laterality Date    JOINT REPLACEMENT      left quadracep tendon rupture repair w/ anchors     Social History     Socioeconomic History    Marital status: Single     Spouse name: Not on file    Number of children: Not on file    Years of education: Not on file    Highest education level: Not on file   Occupational History    Occupation: resp.  therapist   Tobacco Use    Smoking status: Never Smoker    Smokeless tobacco: Never Used   Substance and Sexual Activity  Alcohol use: No     Alcohol/week: 0.0 standard drinks    Drug use: No    Sexual activity: Not on file     Comment: single   Other Topics Concern    Not on file   Social History Narrative    Not on file     Social Determinants of Health     Financial Resource Strain: Low Risk     Difficulty of Paying Living Expenses: Not hard at all   Food Insecurity: No Food Insecurity    Worried About Running Out of Food in the Last Year: Never true    [address] in the Last Year: Never true   Transportation Needs:     Lack of Transportation (Medical):  Lack of Transportation (Non-Medical):    Physical Activity:     Days of Exercise per Week:     Minutes of Exercise per Session:    Stress:     Feeling of Stress :    Social Connections:     Frequency of Communication with Friends and Family:     Frequency of Social Gatherings with Friends and Family:     Attends L[employer] Services:     Active Member of Clubs or Organizations:     Attends Club or Organization Meetings:     Marital Status:    Intimate Partner Violence:     Fear of Current or Ex-Partner:     Emotionally Abused:     Physically Abused:     Sexually Abused:      Family History   Problem Relation Age of Onset    Heart Disease Mother     Arthritis Mother     Dementia Mother     Depression Mother     Diabetes Father     Arthritis Father     Heart Disease Father      Allergies   Allergen Reactions    Morphine     Invokana [Canagliflozin]      UTI and yeast infection     Shellfish-Derived Products Swelling       Current Outpatient Medications:     meloxicam (MOBIC) 15 MG tablet, Take 1 tablet by mouth daily, Disp: 30 tablet, Rfl: 3    clobetasol (TEMOVATE) 0.05 % ointment, Apply topically 2 times daily. , Disp: 1 Tube, Rfl: 1    eplerenone (INSPRA) 50 MG tablet, TAKE 1 TABLET BY MOUTH 2 TIMES A DAY, Disp: 180 tablet, Rfl: 0    levothyroxine (SYNTHROID) 125 MCG tablet, TAKE 1 TABLET BY MOUTH ONE TIME A DAY, Disp: 90 tablet, Rfl: GLOB 3.7 (H) 01/14/2019     Lab Results   Component Value Date    WBC 6.9 03/08/2021    HGB 15.3 03/08/2021    HCT 45.7 03/08/2021    MCV 79.6 (L) 03/08/2021     03/08/2021     Lab Results   Component Value Date    LABA1C 6.4 (H) 08/12/2021    LABA1C 7.0 (H) 03/08/2021    LABA1C 6.7 (H) 09/08/2020     Lab Results   Component Value Date    HDL 31 (L) 09/01/2021    HDL 32 (L) 05/13/2019    HDL 31 (L) 03/21/2019    LDLCALC 60 09/01/2021    LDLCALC 51 05/13/2019    LDLCALC 58 03/21/2019    CHOL 118 09/01/2021    CHOL 108 05/13/2019    CHOL 110 03/21/2019    TRIG 137 09/01/2021    TRIG 124 05/13/2019    TRIG 103 03/21/2019     Lab Results   Component Value Date    TESTM 1164 (H) 09/08/2020    TESTM 875 02/20/2020    TESTM 891 (H) 08/26/2019     Lab Results   Component Value Date    TSH 1.440 09/08/2020    TSH 1.370 02/20/2020    TSH 2.170 08/26/2019    TSHREFLEX 1.350 08/12/2021    TSHREFLEX 1.300 03/08/2021    T4FREE 1.32 08/12/2021    T4FREE 1.44 03/08/2021    T4FREE 1.28 09/08/2020       Review of Systems   Eyes: Negative. Cardiovascular: Negative. Endocrine: Negative. Genitourinary: Negative. All other systems reviewed and are negative. Objective:   Physical Exam  Vitals reviewed. Constitutional:       Appearance: Normal appearance. He is obese. HENT:      Head: Normocephalic and atraumatic. Hair is normal.      Right Ear: External ear normal.      Left Ear: External ear normal.      Nose: Nose normal.   Eyes:      General: No scleral icterus. Right eye: No discharge. Left eye: No discharge. Extraocular Movements: Extraocular movements intact. Conjunctiva/sclera: Conjunctivae normal.   Neck:      Trachea: Trachea normal.   Cardiovascular:      Rate and Rhythm: Normal rate. Pulmonary:      Effort: Pulmonary effort is normal.   Musculoskeletal:         General: Normal range of motion. Cervical back: Normal range of motion and neck supple. Feet:    Neurological:      General: No focal deficit present. Mental Status: He is alert and oriented to person, place, and time.    Psychiatric:         Mood and Affect: Mood normal.         Behavior: Behavior normal.

## 2021-09-16 ENCOUNTER — TELEPHONE (OUTPATIENT)
Dept: ENDOCRINOLOGY | Age: 61
End: 2021-09-16

## 2021-09-18 ASSESSMENT — ENCOUNTER SYMPTOMS: EYES NEGATIVE: 1

## 2021-09-20 DIAGNOSIS — E11.65 UNCONTROLLED TYPE 2 DIABETES MELLITUS WITH HYPERGLYCEMIA (HCC): Primary | ICD-10-CM

## 2021-09-25 RX ORDER — BLOOD-GLUCOSE CONTROL, LOW
EACH MISCELLANEOUS
Qty: 1 EACH | Refills: 0 | Status: SHIPPED | OUTPATIENT
Start: 2021-09-25 | End: 2022-04-21 | Stop reason: SDUPTHER

## 2021-09-25 RX ORDER — BLOOD-GLUCOSE METER
EACH MISCELLANEOUS
Qty: 1 EACH | Refills: 0 | Status: SHIPPED | OUTPATIENT
Start: 2021-09-25 | End: 2022-04-21 | Stop reason: SDUPTHER

## 2021-09-25 RX ORDER — BLOOD SUGAR DIAGNOSTIC
STRIP MISCELLANEOUS
Qty: 100 EACH | Refills: 3 | Status: SHIPPED | OUTPATIENT
Start: 2021-09-25 | End: 2022-04-21 | Stop reason: SDUPTHER

## 2021-09-25 RX ORDER — BLOOD-GLUCOSE CONTROL, LOW
EACH MISCELLANEOUS
Qty: 100 EACH | Refills: 3 | Status: SHIPPED | OUTPATIENT
Start: 2021-09-25 | End: 2022-04-21 | Stop reason: SDUPTHER

## 2021-10-08 DIAGNOSIS — E11.65 UNCONTROLLED TYPE 2 DIABETES MELLITUS WITH HYPERGLYCEMIA (HCC): ICD-10-CM

## 2021-10-11 RX ORDER — SITAGLIPTIN AND METFORMIN HYDROCHLORIDE 500; 50 MG/1; MG/1
TABLET, FILM COATED ORAL
Qty: 180 TABLET | Refills: 3 | Status: SHIPPED | OUTPATIENT
Start: 2021-10-11 | End: 2022-04-21 | Stop reason: SDUPTHER

## 2021-10-21 ENCOUNTER — OFFICE VISIT (OUTPATIENT)
Dept: FAMILY MEDICINE CLINIC | Age: 61
End: 2021-10-21
Payer: COMMERCIAL

## 2021-10-21 VITALS
DIASTOLIC BLOOD PRESSURE: 86 MMHG | WEIGHT: 234 LBS | RESPIRATION RATE: 15 BRPM | SYSTOLIC BLOOD PRESSURE: 132 MMHG | BODY MASS INDEX: 31.69 KG/M2 | OXYGEN SATURATION: 98 % | HEIGHT: 72 IN | HEART RATE: 68 BPM | TEMPERATURE: 97.5 F

## 2021-10-21 DIAGNOSIS — Z11.59 NEED FOR HEPATITIS C SCREENING TEST: ICD-10-CM

## 2021-10-21 DIAGNOSIS — E78.5 HYPERLIPIDEMIA, UNSPECIFIED HYPERLIPIDEMIA TYPE: ICD-10-CM

## 2021-10-21 DIAGNOSIS — Z12.5 SCREENING FOR PROSTATE CANCER: ICD-10-CM

## 2021-10-21 DIAGNOSIS — B35.1 ONYCHOMYCOSIS: Primary | ICD-10-CM

## 2021-10-21 DIAGNOSIS — Z23 ENCOUNTER FOR IMMUNIZATION: ICD-10-CM

## 2021-10-21 DIAGNOSIS — R74.8 ELEVATED CK: ICD-10-CM

## 2021-10-21 DIAGNOSIS — Z12.11 SCREENING FOR COLON CANCER: ICD-10-CM

## 2021-10-21 DIAGNOSIS — I10 PRIMARY HYPERTENSION: ICD-10-CM

## 2021-10-21 DIAGNOSIS — E87.6 HYPOKALEMIA: ICD-10-CM

## 2021-10-21 DIAGNOSIS — Z11.4 SCREENING FOR HIV (HUMAN IMMUNODEFICIENCY VIRUS): ICD-10-CM

## 2021-10-21 PROCEDURE — 99213 OFFICE O/P EST LOW 20 MIN: CPT | Performed by: INTERNAL MEDICINE

## 2021-10-21 RX ORDER — TERBINAFINE HYDROCHLORIDE 250 MG/1
250 TABLET ORAL DAILY
Qty: 30 TABLET | Refills: 0 | Status: SHIPPED | OUTPATIENT
Start: 2021-10-21 | End: 2021-12-13

## 2021-10-21 RX ORDER — ZOSTER VACCINE RECOMBINANT, ADJUVANTED 50 MCG/0.5
0.5 KIT INTRAMUSCULAR SEE ADMIN INSTRUCTIONS
Qty: 0.5 ML | Refills: 0 | Status: SHIPPED | OUTPATIENT
Start: 2021-10-21 | End: 2021-10-21 | Stop reason: CLARIF

## 2021-10-21 ASSESSMENT — ENCOUNTER SYMPTOMS
ABDOMINAL PAIN: 0
BACK PAIN: 0
SHORTNESS OF BREATH: 0
EYE PAIN: 0

## 2021-10-21 NOTE — PROGRESS NOTES
Subjective:      Patient ID: Clyde Yanes is a 61 y.o. male who presents today with:  Chief Complaint   Patient presents with    Annual Exam       HPI    Past Medical History:   Diagnosis Date    Gynecomastia     History of hypokalemia     Hyperaldosteronism, unspecified ([address]     Hypertension     Hypogonadism male     Hypokalemia     Hypothyroidism     Osteoarthritis     Type II or unspecified type diabetes mellitus without mention of complication, not stated as uncontrolled      Past Surgical History:   Procedure Laterality Date    JOINT REPLACEMENT      left quadracep tendon rupture repair w/ anchors     Social History     Socioeconomic History    Marital status: Single     Spouse name: Not on file    Number of children: Not on file    Years of education: Not on file    Highest education level: Not on file   Occupational History    Occupation: resp. therapist   Tobacco Use    Smoking status: Never Smoker    Smokeless tobacco: Never Used   Substance and Sexual Activity    Alcohol use: No     Alcohol/week: 0.0 standard drinks    Drug use: No    Sexual activity: Not on file     Comment: single   Other Topics Concern    Not on file   Social History Narrative    Not on file     Social Determinants of Health     Financial Resource Strain: Low Risk     Difficulty of Paying Living Expenses: Not hard at all   Food Insecurity: No Food Insecurity    Worried About [facility] in the Last Year: Never true    [facility] in the Last Year: Never true   Transportation Needs:     Lack of Transportation (Medical):      Lack of Transportation (Non-Medical):    Physical Activity:     Days of Exercise per Week:     Minutes of Exercise per Session:    Stress:     Feeling of Stress :    Social Connections:     Frequency of Communication with Friends and Family:     Frequency of Social Gatherings with Friends and Family:     Attends [employer] Services:     Active Member of Clubs or Organizations:     Attends Club or Organization Meetings:     Marital Status:    Intimate Partner Violence:     Fear of Current or Ex-Partner:     Emotionally Abused:     Physically Abused:     Sexually Abused: Allergies   Allergen Reactions    Morphine     Invokana [Canagliflozin]      UTI and yeast infection     Shellfish-Derived Products Swelling     Current Outpatient Medications on File Prior to Visit   Medication Sig Dispense Refill    JANUMET  MG per tablet TAKE 1 TABLET BY MOUTH 2 TIMES A DAY WITH MEALS 180 tablet 3    Blood Glucose Monitoring Suppl (PRODIGY AUTOCODE BLOOD GLUCOSE) CATHRYN Give 1 meter 1 each 0    Lancet Devices (PRODIGY LANCING DEVICE) MISC Give 1 device 1 each 0    Prodigy Lancets 28G MISC Test once daily 100 each 3    blood glucose test strips (PRODIGY NO CODING BLOOD GLUC) strip Test once daily 100 each 3    Safety Lancets MISC Safety pro lancets, use one daily 100 each 3    blood glucose test strips (FREESTYLE INSULINX TEST) strip Test once daily 100 strip 3    eplerenone (INSPRA) 50 MG tablet bid 180 tablet 3    tamoxifen (NOLVADEX) 10 MG tablet TAKE 1 TABLET BY MOUTH 2 TIMES A  tablet 3    potassium chloride (KLOR-CON) 10 MEQ extended release tablet Take 1 tablet by mouth daily 90 tablet 3    amLODIPine (NORVASC) 5 MG tablet Take 1 tablet by mouth daily 90 tablet 3    telmisartan (MICARDIS) 80 MG tablet TAKE 1 TABLET BY MOUTH ONE TIME A DAY 90 tablet 3    meloxicam (MOBIC) 15 MG tablet Take 1 tablet by mouth daily 30 tablet 3    clobetasol (TEMOVATE) 0.05 % ointment Apply topically 2 times daily.  1 Tube 1    levothyroxine (SYNTHROID) 125 MCG tablet TAKE 1 TABLET BY MOUTH ONE TIME A DAY 90 tablet 3    ketorolac (TORADOL) 10 MG tablet Take 1 tablet by mouth every 6 hours as needed for Pain 20 tablet 0    atenolol-chlorthalidone (TENORETIC) 100-25 MG per tablet Take 1 tablet by mouth daily 90 tablet 3    Compression Stockings MISC by Does not apply route Thigh- high 30-40mmhg  Dx 187.8; 183.31 1 each 0    Blood Glucose Monitoring Suppl (GINGER LAWS) w/Device KIT Please give 1 meter dx e11.65 1 kit 0    ketorolac (TORADOL) 10 MG tablet Take 1 tablet by mouth every 6 hours as needed for Pain 40 tablet 0    zoster recombinant adjuvanted vaccine [facility]) 50 MCG/0.5ML SUSR injection Inject 0.5 mLs into the muscle See Admin Instructions 1 dose now and repeat in 2-6 months (Patient not taking: Reported on 10/21/2021) 1 each 0     No current facility-administered medications on file prior to visit. I have personally reviewed the ROS, PMH, PFH, and social history     Review of Systems   Constitutional: Negative for chills. HENT: Negative for congestion. Eyes: Negative for pain. Respiratory: Negative for shortness of breath. Cardiovascular: Negative for chest pain. Gastrointestinal: Negative for abdominal pain. Genitourinary: Negative for hematuria. Musculoskeletal: Negative for back pain. Allergic/Immunologic: Negative for immunocompromised state. Neurological: Negative for headaches. Psychiatric/Behavioral: Negative for hallucinations. Objective:   /86 (Site: Right Upper Arm, Position: Sitting, Cuff Size: Large Adult)   Pulse 68   Temp 97.5 °F (36.4 °C) (Temporal)   Resp 15   Ht 6' (1.829 m)   Wt 234 lb (106.1 kg)   SpO2 98%   BMI 31.74 kg/m²     Physical Exam  Constitutional:       General: He is not in acute distress. Appearance: Normal appearance. He is not ill-appearing, toxic-appearing or diaphoretic. HENT:      Head: Normocephalic. Neck:      Vascular: No carotid bruit. Cardiovascular:      Rate and Rhythm: Normal rate and regular rhythm. Pulses: Normal pulses. Heart sounds: Normal heart sounds. No murmur heard. No friction rub. No gallop. Pulmonary:      Effort: Pulmonary effort is normal. No respiratory distress. Breath sounds: Normal breath sounds.  No wheezing, rhonchi or rales. Abdominal:      General: Abdomen is flat. There is no distension. Palpations: Abdomen is soft. Tenderness: There is no abdominal tenderness. There is no right CVA tenderness, left CVA tenderness, guarding or rebound. Musculoskeletal:      Cervical back: Neck supple. Right lower leg: No edema. Left lower leg: No edema. Skin:     General: Skin is warm. Findings: No erythema or rash. Comments: Feet without ulcers    Neurological:      Mental Status: He is alert. Psychiatric:         Mood and Affect: Mood normal.         Refused foot exam   Assessment:       Diagnosis Orders   1. Onychomycosis     2. Primary hypertension     3. Hyperlipidemia, unspecified hyperlipidemia type     4. Encounter for immunization     5. Screening for colon cancer           Plan:     vc  Refused HERMAN  2 More months of tamisil. Get labs as ordered through Dr. Sushila Carrington. (HE Manages diabetes)   Call in 3 bp readings from home   Increase potassium to 30 meq once daiy. No orders of the defined types were placed in this encounter. No orders of the defined types were placed in this encounter. can miss prostate cancer by not doing   Risk of permament liver damage explained. He knows never to take both nsaids in same day   If anything should change or worsen call ASAP, don't wait for next scheduled appointment. No follow-ups on file.       Greg Dubon MD

## 2021-11-29 RX ORDER — ATENOLOL AND CHLORTHALIDONE TABLET 100; 25 MG/1; MG/1
TABLET ORAL
Qty: 90 TABLET | Refills: 1 | Status: SHIPPED | OUTPATIENT
Start: 2021-11-29 | End: 2022-04-21 | Stop reason: SDUPTHER

## 2021-12-02 ENCOUNTER — TELEPHONE (OUTPATIENT)
Dept: FAMILY MEDICINE CLINIC | Age: 61
End: 2021-12-02

## 2021-12-02 DIAGNOSIS — Z12.11 SCREENING FOR COLON CANCER: ICD-10-CM

## 2021-12-02 DIAGNOSIS — Z11.59 NEED FOR HEPATITIS C SCREENING TEST: ICD-10-CM

## 2021-12-02 DIAGNOSIS — E11.65 UNCONTROLLED TYPE 2 DIABETES MELLITUS WITH HYPERGLYCEMIA (HCC): ICD-10-CM

## 2021-12-02 DIAGNOSIS — R74.8 ELEVATED CK: ICD-10-CM

## 2021-12-02 DIAGNOSIS — Z11.4 SCREENING FOR HIV (HUMAN IMMUNODEFICIENCY VIRUS): ICD-10-CM

## 2021-12-02 DIAGNOSIS — I10 PRIMARY HYPERTENSION: ICD-10-CM

## 2021-12-02 DIAGNOSIS — E03.9 HYPOTHYROIDISM, UNSPECIFIED TYPE: ICD-10-CM

## 2021-12-02 DIAGNOSIS — Z23 ENCOUNTER FOR IMMUNIZATION: ICD-10-CM

## 2021-12-02 DIAGNOSIS — E78.5 HYPERLIPIDEMIA, UNSPECIFIED HYPERLIPIDEMIA TYPE: ICD-10-CM

## 2021-12-02 DIAGNOSIS — B35.1 ONYCHOMYCOSIS: ICD-10-CM

## 2021-12-02 DIAGNOSIS — Z12.5 SCREENING FOR PROSTATE CANCER: ICD-10-CM

## 2021-12-02 LAB
ALBUMIN SERPL-MCNC: 4.4 G/DL (ref 3.5–4.6)
ALBUMIN SERPL-MCNC: 4.5 G/DL (ref 3.5–4.6)
ALP BLD-CCNC: 78 U/L (ref 35–104)
ALP BLD-CCNC: 81 U/L (ref 35–104)
ALT SERPL-CCNC: 25 U/L (ref 0–41)
ALT SERPL-CCNC: 25 U/L (ref 0–41)
ANION GAP SERPL CALCULATED.3IONS-SCNC: 13 MEQ/L (ref 9–15)
AST SERPL-CCNC: 29 U/L (ref 0–40)
AST SERPL-CCNC: 30 U/L (ref 0–40)
BASOPHILS ABSOLUTE: 0 K/UL (ref 0–0.2)
BASOPHILS RELATIVE PERCENT: 0.4 %
BILIRUB SERPL-MCNC: 0.4 MG/DL (ref 0.2–0.7)
BILIRUB SERPL-MCNC: 0.4 MG/DL (ref 0.2–0.7)
BILIRUBIN DIRECT: <0.2 MG/DL (ref 0–0.4)
BILIRUBIN, INDIRECT: NORMAL MG/DL (ref 0–0.6)
BUN BLDV-MCNC: 15 MG/DL (ref 8–23)
CALCIUM SERPL-MCNC: 9.5 MG/DL (ref 8.5–9.9)
CHLORIDE BLD-SCNC: 103 MEQ/L (ref 95–107)
CHOLESTEROL, TOTAL: 114 MG/DL (ref 0–199)
CO2: 28 MEQ/L (ref 20–31)
CREAT SERPL-MCNC: 0.89 MG/DL (ref 0.7–1.2)
CREATININE URINE: 350.7 MG/DL
EOSINOPHILS ABSOLUTE: 0.1 K/UL (ref 0–0.7)
EOSINOPHILS RELATIVE PERCENT: 1.7 %
GFR AFRICAN AMERICAN: >60
GFR NON-AFRICAN AMERICAN: >60
GLOBULIN: 3 G/DL (ref 2.3–3.5)
GLUCOSE BLD-MCNC: 119 MG/DL (ref 70–99)
HBA1C MFR BLD: 6.9 % (ref 4.8–5.9)
HCT VFR BLD CALC: 42.9 % (ref 42–52)
HDLC SERPL-MCNC: 34 MG/DL (ref 40–59)
HEMOGLOBIN: 14.1 G/DL (ref 14–18)
HEPATITIS C ANTIBODY: NONREACTIVE
HIV AG/AB: NONREACTIVE
LDL CHOLESTEROL CALCULATED: 60 MG/DL (ref 0–129)
LYMPHOCYTES ABSOLUTE: 2.6 K/UL (ref 1–4.8)
LYMPHOCYTES RELATIVE PERCENT: 39.1 %
MCH RBC QN AUTO: 26 PG (ref 27–31.3)
MCHC RBC AUTO-ENTMCNC: 32.8 % (ref 33–37)
MCV RBC AUTO: 79.3 FL (ref 80–100)
MICROALBUMIN UR-MCNC: 8.5 MG/DL
MICROALBUMIN/CREAT UR-RTO: 24.2 MG/G (ref 0–30)
MONOCYTES ABSOLUTE: 0.6 K/UL (ref 0.2–0.8)
MONOCYTES RELATIVE PERCENT: 9.4 %
NEUTROPHILS ABSOLUTE: 3.3 K/UL (ref 1.4–6.5)
NEUTROPHILS RELATIVE PERCENT: 49.4 %
PDW BLD-RTO: 14.8 % (ref 11.5–14.5)
PLATELET # BLD: 292 K/UL (ref 130–400)
POTASSIUM SERPL-SCNC: 3 MEQ/L (ref 3.4–4.9)
PROSTATE SPECIFIC ANTIGEN: 1.44 NG/ML (ref 0–4)
RBC # BLD: 5.41 M/UL (ref 4.7–6.1)
SODIUM BLD-SCNC: 144 MEQ/L (ref 135–144)
T4 FREE: 1.46 NG/DL (ref 0.84–1.68)
TOTAL CK: 274 U/L (ref 0–190)
TOTAL PROTEIN: 7.4 G/DL (ref 6.3–8)
TOTAL PROTEIN: 7.5 G/DL (ref 6.3–8)
TRIGL SERPL-MCNC: 99 MG/DL (ref 0–150)
TSH REFLEX: 1.21 UIU/ML (ref 0.44–3.86)
WBC # BLD: 6.8 K/UL (ref 4.8–10.8)

## 2021-12-03 ENCOUNTER — TELEPHONE (OUTPATIENT)
Dept: FAMILY MEDICINE CLINIC | Age: 61
End: 2021-12-03

## 2021-12-03 RX ORDER — SODIUM, POTASSIUM,MAG SULFATES 17.5-3.13G
SOLUTION, RECONSTITUTED, ORAL ORAL
Qty: 1 EACH | Refills: 0 | Status: SHIPPED | OUTPATIENT
Start: 2021-12-03 | End: 2022-04-21 | Stop reason: CLARIF

## 2021-12-03 RX ORDER — POTASSIUM CHLORIDE 750 MG/1
30 TABLET, FILM COATED, EXTENDED RELEASE ORAL DAILY
Qty: 1 TABLET | Refills: 0
Start: 2021-12-03 | End: 2022-04-21 | Stop reason: SDUPTHER

## 2021-12-03 NOTE — TELEPHONE ENCOUNTER
I would take a total of 30 meq once daily (3 of the 10 meq tablets) and get a repeat lab in one week  If it goes lower it can be very dangerous  If not feeling well let me know  If he need a new rx let me know

## 2021-12-10 DIAGNOSIS — B35.1 ONYCHOMYCOSIS: ICD-10-CM

## 2021-12-13 RX ORDER — TERBINAFINE HYDROCHLORIDE 250 MG/1
250 TABLET ORAL DAILY
Qty: 30 TABLET | Refills: 0 | Status: SHIPPED | OUTPATIENT
Start: 2021-12-13 | End: 2022-01-12

## 2021-12-13 NOTE — TELEPHONE ENCOUNTER
Pharmacy requesting medication refill.  Please approve or deny this request.    Rx requested:  Requested Prescriptions     Pending Prescriptions Disp Refills    terbinafine (LAMISIL) 250 MG tablet [Pharmacy Med Name: TERBINAFINE 250MG TABLETS] 30 tablet 0     Sig: TAKE 1 TABLET BY MOUTH DAILY         Last Office Visit:   10/21/2021      Next Visit Date:  Future Appointments   Date Time Provider Jeremy Sanchezi   3/10/2022  9:00 AM Loren Lazo MD [facility]   4/21/2022  8:00 AM Chelo Castro MD [address] [place] [address]

## 2021-12-17 ENCOUNTER — ANCILLARY PROCEDURE (OUTPATIENT)
Dept: ENDOSCOPY | Age: 61
End: 2021-12-17
Attending: INTERNAL MEDICINE
Payer: COMMERCIAL

## 2021-12-17 ENCOUNTER — ANESTHESIA EVENT (OUTPATIENT)
Dept: ENDOSCOPY | Age: 61
End: 2021-12-17
Payer: COMMERCIAL

## 2021-12-17 ENCOUNTER — HOSPITAL ENCOUNTER (OUTPATIENT)
Age: 61
Setting detail: OUTPATIENT SURGERY
Discharge: HOME | End: 2021-12-17
Attending: INTERNAL MEDICINE | Admitting: INTERNAL MEDICINE
Payer: COMMERCIAL

## 2021-12-17 ENCOUNTER — ANESTHESIA (OUTPATIENT)
Dept: ENDOSCOPY | Age: 61
End: 2021-12-17
Payer: COMMERCIAL

## 2021-12-17 VITALS
WEIGHT: 240 LBS | DIASTOLIC BLOOD PRESSURE: 87 MMHG | RESPIRATION RATE: 16 BRPM | HEIGHT: 72 IN | OXYGEN SATURATION: 98 % | TEMPERATURE: 98.4 F | SYSTOLIC BLOOD PRESSURE: 128 MMHG | HEART RATE: 84 BPM | BODY MASS INDEX: 32.51 KG/M2

## 2021-12-17 VITALS
OXYGEN SATURATION: 98 % | RESPIRATION RATE: 11 BRPM | SYSTOLIC BLOOD PRESSURE: 112 MMHG | DIASTOLIC BLOOD PRESSURE: 78 MMHG

## 2021-12-17 LAB
GLUCOSE BLD-MCNC: 141 MG/DL (ref 60–115)
PERFORMED ON: ABNORMAL

## 2021-12-17 PROCEDURE — 2580000003 HC RX 258: Performed by: INTERNAL MEDICINE

## 2021-12-17 PROCEDURE — 7100000011 HC PHASE II RECOVERY - ADDTL 15 MIN: Performed by: INTERNAL MEDICINE

## 2021-12-17 PROCEDURE — 6370000000 HC RX 637 (ALT 250 FOR IP): Performed by: INTERNAL MEDICINE

## 2021-12-17 PROCEDURE — 2580000003 HC RX 258

## 2021-12-17 PROCEDURE — 3700000000 HC ANESTHESIA ATTENDED CARE: Performed by: INTERNAL MEDICINE

## 2021-12-17 PROCEDURE — 3700000001 HC ADD 15 MINUTES (ANESTHESIA): Performed by: INTERNAL MEDICINE

## 2021-12-17 PROCEDURE — 88305 TISSUE EXAM BY PATHOLOGIST: CPT

## 2021-12-17 PROCEDURE — 3609027000 HC COLONOSCOPY: Performed by: INTERNAL MEDICINE

## 2021-12-17 PROCEDURE — 7100000010 HC PHASE II RECOVERY - FIRST 15 MIN: Performed by: INTERNAL MEDICINE

## 2021-12-17 PROCEDURE — 6360000002 HC RX W HCPCS: Performed by: NURSE ANESTHETIST, CERTIFIED REGISTERED

## 2021-12-17 PROCEDURE — 45385 COLONOSCOPY W/LESION REMOVAL: CPT | Performed by: INTERNAL MEDICINE

## 2021-12-17 PROCEDURE — 2709999900 HC NON-CHARGEABLE SUPPLY: Performed by: INTERNAL MEDICINE

## 2021-12-17 RX ORDER — PROPOFOL 10 MG/ML
INJECTION, EMULSION INTRAVENOUS PRN
Status: DISCONTINUED | OUTPATIENT
Start: 2021-12-17 | End: 2021-12-17 | Stop reason: SDUPTHER

## 2021-12-17 RX ORDER — MAGNESIUM HYDROXIDE 1200 MG/15ML
LIQUID ORAL PRN
Status: DISCONTINUED | OUTPATIENT
Start: 2021-12-17 | End: 2021-12-17 | Stop reason: ALTCHOICE

## 2021-12-17 RX ORDER — SODIUM CHLORIDE 9 MG/ML
INJECTION, SOLUTION INTRAVENOUS CONTINUOUS
Status: CANCELLED | OUTPATIENT
Start: 2021-12-17

## 2021-12-17 RX ORDER — SODIUM CHLORIDE 9 MG/ML
INJECTION, SOLUTION INTRAVENOUS
Status: COMPLETED
Start: 2021-12-17 | End: 2021-12-17

## 2021-12-17 RX ORDER — ONDANSETRON 2 MG/ML
4 INJECTION INTRAMUSCULAR; INTRAVENOUS
Status: DISCONTINUED | OUTPATIENT
Start: 2021-12-17 | End: 2021-12-17 | Stop reason: HOSPADM

## 2021-12-17 RX ORDER — LIDOCAINE HYDROCHLORIDE 10 MG/ML
1 INJECTION, SOLUTION EPIDURAL; INFILTRATION; INTRACAUDAL; PERINEURAL
Status: CANCELLED | OUTPATIENT
Start: 2021-12-17 | End: 2021-12-17

## 2021-12-17 RX ORDER — SODIUM CHLORIDE 0.9 % (FLUSH) 0.9 %
5-40 SYRINGE (ML) INJECTION PRN
Status: CANCELLED | OUTPATIENT
Start: 2021-12-17

## 2021-12-17 RX ORDER — SODIUM CHLORIDE 9 MG/ML
25 INJECTION, SOLUTION INTRAVENOUS PRN
Status: CANCELLED | OUTPATIENT
Start: 2021-12-17

## 2021-12-17 RX ORDER — SODIUM CHLORIDE 9 MG/ML
INJECTION, SOLUTION INTRAVENOUS CONTINUOUS
Status: DISCONTINUED | OUTPATIENT
Start: 2021-12-17 | End: 2021-12-17 | Stop reason: HOSPADM

## 2021-12-17 RX ORDER — SODIUM CHLORIDE 0.9 % (FLUSH) 0.9 %
5-40 SYRINGE (ML) INJECTION EVERY 12 HOURS SCHEDULED
Status: CANCELLED | OUTPATIENT
Start: 2021-12-17

## 2021-12-17 RX ORDER — SIMETHICONE 20 MG/.3ML
EMULSION ORAL PRN
Status: DISCONTINUED | OUTPATIENT
Start: 2021-12-17 | End: 2021-12-17 | Stop reason: ALTCHOICE

## 2021-12-17 RX ADMIN — PROPOFOL 380 MG: 10 INJECTION, EMULSION INTRAVENOUS at 09:08

## 2021-12-17 RX ADMIN — PHENYLEPHRINE HYDROCHLORIDE 100 MCG: 10 INJECTION INTRAVENOUS at 09:23

## 2021-12-17 RX ADMIN — SODIUM CHLORIDE: 9 INJECTION, SOLUTION INTRAVENOUS at 08:57

## 2021-12-17 ASSESSMENT — PAIN - FUNCTIONAL ASSESSMENT: PAIN_FUNCTIONAL_ASSESSMENT: 0-10

## 2021-12-17 NOTE — H&P
Patient Name: Ethan Gordillo  : 1960  MRN: [id number]  DATE: 21      ENDOSCOPY  History and Physical    Procedure:    [x] Diagnostic Colonoscopy       [] Screening Colonoscopy  [] EGD      [] ERCP      [] EUS       [] Other    [x] Previous office notes/History and Physical reviewed from the patients chart. Please see EMR for further details of HPI. I have examined the patient's status immediately prior to the procedure and:      Indications/HPI:    []Abdominal Pain   []Cancer- GI/Lung  [x]Fhx of colon CA/polyps  []History of Polyps   []Ryans   []Melena  []Abnormal Imaging   []Dysphagia    []Persistent Pneumonia  []Anemia   []Food Impaction  []History of Polyps  []GI Bleed   []Pulmonary nodule/Mass  []Change in bowel habits  []Heartburn/Reflux  []Rectal Bleed (BRBPR)  []Chest Pain - Non Cardiac  []Heme (+) Stool  []Ulcers  []Constipation   []Hemoptysis   []Varices  []Diarrhea   []Hypoxemia  []Nausea/Vomiting   []Screening   []Crohns/Colitis  []Other:    Anesthesia:   [x] MAC [] Moderate Sedation   [] General   [] None     ROS: 12 pt Review of Symptoms was negative unless mentioned above    Medications:   Prior to Admission medications    Medication Sig Start Date End Date Taking?  Authorizing Provider   Multiple Vitamin (MULTIVITAMIN ADULT PO) Take by mouth daily   Yes Historical Provider, MD   terbinafine (LAMISIL) 250 MG tablet TAKE 1 TABLET BY MOUTH DAILY 21 Yes Kate Jerry MD   Na Sulfate-K Sulfate-Mg Sulf 17.5-3.13-1.6 GM/177ML SOLN As directed 12/3/21  Yes JOSUE Alvarez - CNP   potassium chloride (KLOR-CON) 10 MEQ extended release tablet Take 3 tablets by mouth daily 12/3/21  Yes Kate Jerry MD   atenolol-chlorthalidone (TENORETIC) 100-25 MG per tablet TAKE 1 TABLET BY MOUTH ONE TIME A DAY 21  Yes Mira Mejía MD   telmisartan (MICARDIS) 80 MG tablet TAKE 1 TABLET BY MOUTH ONE TIME A DAY 21  Yes JONATHAN Humphrey   tamoxifen (NOLVADEX) 10 MG tablet TAKE 1 TABLET BY MOUTH 2 TIMES A DAY 11/16/21  Yes JONATHAN Grey   JANUMET  MG per tablet TAKE 1 TABLET BY MOUTH 2 TIMES A DAY WITH MEALS 10/11/21  Yes Paul Orlando MD   eplerenone (INSPRA) 50 MG tablet bid 9/9/21  Yes Paul Orlando MD   amLODIPine (NORVASC) 5 MG tablet Take 1 tablet by mouth daily 9/9/21  Yes Paul Orlando MD   meloxicam (MOBIC) 15 MG tablet Take 1 tablet by mouth daily 8/20/21  Yes JONATHAN Estevez   levothyroxine (SYNTHROID) 125 MCG tablet TAKE 1 TABLET BY MOUTH ONE TIME A DAY 7/13/21  Yes Paul Orlando MD   ketorolac (TORADOL) 10 MG tablet Take 1 tablet by mouth every 6 hours as needed for Pain 7/7/21  Yes Carrie Nalyor MD   Blood Glucose Monitoring Suppl (PRODIGY AUTOCODE BLOOD GLUCOSE) CATHRYN Give 1 meter 9/25/21   Paul Orlando MD   Lancet Devices (PRODIGY LANCING DEVICE) MISC Give 1 device 9/25/21   MD Maria Antonia Verde Lancets 28G MISC Test once daily 9/25/21   Paul Orlando MD   blood glucose test strips (PRODIGY NO CODING BLOOD GLUC) strip Test once daily 9/25/21   Paul Orlando MD   Safety Lancets MISC Safety pro lancets, use one daily 9/9/21   Paul Orlando MD   blood glucose test strips (FREESTYLE INSULINX TEST) strip Test once daily 9/9/21   Paul Orlando MD   ketorolac (TORADOL) 10 MG tablet Take 1 tablet by mouth every 6 hours as needed for Pain 8/2/21 8/12/21  JONATHAN Estevez   Blood Glucose Monitoring Suppl [facility]) w/Device KIT Please give 1 meter dx e11.65 3/14/18   Paul Orlando MD       Allergies:    Allergies   Allergen Reactions    Morphine     Invokana [Canagliflozin]      UTI and yeast infection     Shellfish-Derived Products Swelling        History of allergic reaction to anesthesia:  No    Past Medical History:  Past Medical History:   Diagnosis Date    Gynecomastia     History of hypokalemia     Hyperaldosteronism, unspecified ([address]     Hypertension     Hypogonadism male     Hypokalemia     Hypothyroidism     Osteoarthritis     Type II or unspecified type diabetes mellitus without mention of complication, not stated as uncontrolled        Past Surgical History:  Past Surgical History:   Procedure Laterality Date    JOINT REPLACEMENT      left quadracep tendon rupture repair w/ anchors    MANDIBLE FRACTURE SURGERY      TONSILLECTOMY         Social History:  Social History     Tobacco Use    Smoking status: Never Smoker    Smokeless tobacco: Never Used   Vaping Use    Vaping Use: Never used   Substance Use Topics    Alcohol use: No     Alcohol/week: 0.0 standard drinks    Drug use: No       Vital Signs: There were no vitals filed for this visit. Physical Exam:  Cardiac:  [x]WNL  []Comments:  Pulmonary:  [x]WNL   []Comments:   Neuro/Mental Status:  [x]WNL  []Comments:  Abdominal:  [x]WNL    []Comments:  Other:   []WNL  []Comments:    Informed Consent:  The risks and benefits of the procedure have been discussed with either the patient or if they cannot consent, their representative. Assessment:  Patient examined and appropriate for planned sedation and procedure. Plan:  Proceed with planned sedation and procedure as above.     Radha Carbajal MD  8:47 AM

## 2021-12-17 NOTE — ANESTHESIA POSTPROCEDURE EVALUATION
Department of Anesthesiology  Postprocedure Note    Patient: Mahsa Vaqsuez  MRN: [id number]  YOB: 1960  Date of evaluation: 12/17/2021  Time:  9:36 AM     Procedure Summary     Date: 12/17/21 Room / Location: [address] [place] [address]    Anesthesia Start: 6199 Anesthesia Stop: 0936    Procedure: COLORECTAL CANCER SCREENING, HIGH RISK (N/A ) Diagnosis: (Family history of colon cancer Z80.0)    Surgeons: Rene Corcoran MD Responsible Provider: JOSUE Moran CRNA    Anesthesia Type: MAC ASA Status: 2          Anesthesia Type: MAC    Josep Phase I: Josep Score: 10    Josep Phase II:      Last vitals: Reviewed and per EMR flowsheets.        Anesthesia Post Evaluation    Patient location during evaluation: bedside  Patient participation: complete - patient participated  Level of consciousness: awake and awake and alert  Airway patency: patent  Nausea & Vomiting: no nausea and no vomiting  Complications: no  Cardiovascular status: blood pressure returned to baseline and hemodynamically stable  Respiratory status: acceptable  Hydration status: euvolemic

## 2021-12-17 NOTE — ANESTHESIA PRE PROCEDURE
tablet Take 1 tablet by mouth daily 8/20/21   JONATHAN Landry   ketorolac (TORADOL) 10 MG tablet Take 1 tablet by mouth every 6 hours as needed for Pain 8/2/21 8/12/21  JONATHAN Tse   levothyroxine (SYNTHROID) 125 MCG tablet TAKE 1 TABLET BY MOUTH ONE TIME A DAY 7/13/21   Elvira Page MD   ketorolac (TORADOL) 10 MG tablet Take 1 tablet by mouth every 6 hours as needed for Pain 7/7/21   Roslyn Ramires MD   Blood Glucose Monitoring Suppl (AGAMATRIX PRESTO) w/Device KIT Please give 1 meter dx e11.65 3/14/18   Elvira Page MD       Current medications:    Current Facility-Administered Medications   Medication Dose Route Frequency Provider Last Rate Last Admin    0.9 % sodium chloride infusion   IntraVENous Continuous Bryan Hurst MD           Allergies:     Allergies   Allergen Reactions    Morphine     Invokana [Canagliflozin]      UTI and yeast infection     Shellfish-Derived Products Swelling       Problem List:    Patient Active Problem List   Diagnosis Code    Hypokalemia E87.6    Gynecomastia N62    Hypogonadism male E29.1    History of hypokalemia Z86.39    Hyperaldosteronism ([address] E26.9    Hypertension I10    Type II diabetes mellitus, uncontrolled ([address] E11.65    Hypothyroidism E03.9    Obesity E66.9    Edema of both lower extremities due to peripheral venous insufficiency I87.2    Varicose veins of bilateral lower extremities with pain I83.813    Plantar fasciitis M72.2    Right foot pain M79.671    Pain and swelling of right knee M25.561, M25.461       Past Medical History:        Diagnosis Date    Gynecomastia     History of hypokalemia     Hyperaldosteronism, unspecified ([address]     Hypertension     Hypogonadism male     Hypokalemia     Hypothyroidism     Osteoarthritis     Type II or unspecified type diabetes mellitus without mention of complication, not stated as uncontrolled        Past Surgical History:        Procedure Laterality Date    JOINT REPLACEMENT      left quadracep tendon rupture repair w/ anchors       Social History:    Social History     Tobacco Use    Smoking status: Never Smoker    Smokeless tobacco: Never Used   Substance Use Topics    Alcohol use: No     Alcohol/week: 0.0 standard drinks                                Counseling given: Not Answered      Vital Signs (Current): There were no vitals filed for this visit. BP Readings from Last 3 Encounters:   10/21/21 132/86   09/09/21 (!) 149/87   08/20/21 122/82       NPO Status:                                                                                 BMI:   Wt Readings from Last 3 Encounters:   10/21/21 234 lb (106.1 kg)   09/09/21 235 lb (106.6 kg)   08/20/21 232 lb (105.2 kg)     There is no height or weight on file to calculate BMI.    CBC:   Lab Results   Component Value Date    WBC 6.8 12/02/2021    RBC 5.41 12/02/2021    RBC 5.82 02/05/2012    HGB 14.1 12/02/2021    HCT 42.9 12/02/2021    MCV 79.3 12/02/2021    RDW 14.8 12/02/2021     12/02/2021       CMP:   Lab Results   Component Value Date     12/02/2021    K 3.0 12/02/2021     12/02/2021    CO2 28 12/02/2021    BUN 15 12/02/2021    CREATININE 0.89 12/02/2021    GFRAA >60.0 12/02/2021    LABGLOM >60.0 12/02/2021    GLUCOSE 119 12/02/2021    GLUCOSE 126 05/13/2012    PROT 7.5 12/02/2021    CALCIUM 9.5 12/02/2021    BILITOT 0.4 12/02/2021    ALKPHOS 81 12/02/2021    AST 29 12/02/2021    ALT 25 12/02/2021       POC Tests: No results for input(s): POCGLU, POCNA, POCK, POCCL, POCBUN, POCHEMO, POCHCT in the last 72 hours.     Coags: No results found for: PROTIME, INR, APTT    HCG (If Applicable): No results found for: PREGTESTUR, PREGSERUM, HCG, HCGQUANT     ABGs: No results found for: PHART, PO2ART, EFR5KPB, NBJ2ZMC, BEART, Y6SGXLAU     Type & Screen (If Applicable):  No results found for: LABABO, LABRH    Drug/Infectious Status (If Applicable):  Lab Results Component Value Date    HEPCAB NONREACTIVE 12/02/2021       COVID-19 Screening (If Applicable): No results found for: COVID19        Anesthesia Evaluation  Patient summary reviewed and Nursing notes reviewed  Airway: Mallampati: II  TM distance: >3 FB   Neck ROM: full  Mouth opening: > = 3 FB Dental:          Pulmonary:normal exam                               Cardiovascular:    (+) hypertension:,                   Neuro/Psych:   (+) depression/anxiety             GI/Hepatic/Renal:   (+) bowel prep, morbid obesity          Endo/Other:    (+) hypothyroidism::., .                 Abdominal:   (+) obese,     Abdomen: soft. Vascular: Other Findings:             Anesthesia Plan      MAC     ASA 2       Induction: intravenous. Anesthetic plan and risks discussed with patient. Plan discussed with attending.                   JOSUE Georges - CRNA   12/17/2021

## 2022-01-15 NOTE — RESULT ENCOUNTER NOTE
1/15/2022  Juan Fitzgerald  [address] [facility] [id number]    Dear Juan Fitzgerald,    Your colonoscopy reveled a growth on the large intestine (Colon) called a polyp. A polyp could be a \"precancerous\" growth, which means that with time it could turn into cancer. Polyps were removed during your procedure. As instructed after your colonoscopy, recommendations suggest a follow up colonoscopy in 5 years    We hope you are doing well, and if you have any questions please contact me at [phone]. Thank you for choosing [facility] for your healthcare.     Sincerely,     Marline Hobson MD

## 2022-01-26 DIAGNOSIS — I10 ESSENTIAL HYPERTENSION: ICD-10-CM

## 2022-01-26 RX ORDER — TELMISARTAN 80 MG/1
TABLET ORAL
Qty: 90 TABLET | Refills: 0 | Status: SHIPPED | OUTPATIENT
Start: 2022-01-26 | End: 2022-04-21 | Stop reason: SDUPTHER

## 2022-02-07 RX ORDER — TAMOXIFEN CITRATE 10 MG/1
TABLET ORAL
Qty: 180 TABLET | Refills: 0 | Status: SHIPPED | OUTPATIENT
Start: 2022-02-07 | End: 2022-05-10

## 2022-03-10 ENCOUNTER — OFFICE VISIT (OUTPATIENT)
Dept: ENDOCRINOLOGY | Age: 62
End: 2022-03-10
Payer: COMMERCIAL

## 2022-03-10 VITALS
DIASTOLIC BLOOD PRESSURE: 89 MMHG | BODY MASS INDEX: 33.59 KG/M2 | OXYGEN SATURATION: 98 % | SYSTOLIC BLOOD PRESSURE: 150 MMHG | HEART RATE: 55 BPM | WEIGHT: 248 LBS | HEIGHT: 72 IN

## 2022-03-10 DIAGNOSIS — E11.65 UNCONTROLLED TYPE 2 DIABETES MELLITUS WITH HYPERGLYCEMIA (HCC): Primary | ICD-10-CM

## 2022-03-10 DIAGNOSIS — E03.9 HYPOTHYROIDISM, UNSPECIFIED TYPE: ICD-10-CM

## 2022-03-10 LAB
CHP ED QC CHECK: NORMAL
GLUCOSE BLD-MCNC: 128 MG/DL
HBA1C MFR BLD: 6.7 %

## 2022-03-10 PROCEDURE — 99213 OFFICE O/P EST LOW 20 MIN: CPT | Performed by: INTERNAL MEDICINE

## 2022-03-10 PROCEDURE — 83036 HEMOGLOBIN GLYCOSYLATED A1C: CPT | Performed by: INTERNAL MEDICINE

## 2022-03-10 PROCEDURE — 82962 GLUCOSE BLOOD TEST: CPT | Performed by: INTERNAL MEDICINE

## 2022-03-10 NOTE — PROGRESS NOTES
3/10/2022    Assessment:       Diagnosis Orders   1. Uncontrolled type 2 diabetes mellitus with hyperglycemia (HCC)  POCT Glucose    POCT glycosylated hemoglobin (Hb A1C)   2. Hypothyroidism, unspecified type           PLAN:     Continue current dose of Janumet 50/500  twice a day Synthroid Synthroid 125 mcg daily continue current blood pressure medication regimen patient to follow-up in 6 months time monitor potassium    Orders Placed This Encounter   Procedures    TSH with Reflex     Standing Status:   Future     Standing Expiration Date:   3/10/2023    T4, Free     Standing Status:   Future     Standing Expiration Date:   3/10/2023    Hemoglobin A1C     Standing Status:   Future     Standing Expiration Date:   3/10/2023    Basic Metabolic Panel, Fasting     Standing Status:   Future     Standing Expiration Date:   3/10/2023    POCT Glucose    POCT glycosylated hemoglobin (Hb A1C)         Orders Placed This Encounter   Procedures    POCT Glucose    POCT glycosylated hemoglobin (Hb A1C)     No orders of the defined types were placed in this encounter. No follow-ups on file. Subjective:     Chief Complaint   Patient presents with    Diabetes    Hypothyroidism     Vitals:    03/10/22 0919   BP: (!) 163/98   Pulse: 55   SpO2: 98%   Weight: 248 lb (112.5 kg)   Height: 6' (1.829 m)     Wt Readings from Last 3 Encounters:   03/10/22 248 lb (112.5 kg)   12/17/21 240 lb (108.9 kg)   10/21/21 234 lb (106.1 kg)     BP Readings from Last 3 Encounters:   03/10/22 (!) 163/98   12/17/21 128/87   12/17/21 112/78     Follow-up on type 2 diabetes hypothyroidism patient last labs show hypokalemia patient is on potassium replacement advised to repeat potassium labs blood sugars have been stable history of hypogonadism history of hypertension gynecomastia on medications  Obesity BMI 33    Diabetes  He presents for his follow-up diabetic visit. He has type 2 diabetes mellitus. Symptoms are stable.  Risk factors for coronary artery disease include obesity. Current diabetic treatment includes oral agent (dual therapy). His overall blood glucose range is 130-140 mg/dl. (Lab Results       Component                Value               Date                       LABA1C                   6.7                 03/10/2022              ) An ACE inhibitor/angiotensin II receptor blocker is being taken. Past Medical History:   Diagnosis Date    Gynecomastia     History of hypokalemia     Hyperaldosteronism, unspecified ([address]     Hypertension     Hypogonadism male     Hypokalemia     Hypothyroidism     Osteoarthritis     Type II or unspecified type diabetes mellitus without mention of complication, not stated as uncontrolled      Past Surgical History:   Procedure Laterality Date    COLONOSCOPY N/A 12/17/2021    COLORECTAL CANCER SCREENING, HIGH RISK performed by Moira Krause MD at [address]      left quadracep tendon rupture repair w/ anchors    MANDIBLE FRACTURE SURGERY      TONSILLECTOMY       Social History     Socioeconomic History    Marital status: Single     Spouse name: Not on file    Number of children: Not on file    Years of education: Not on file    Highest education level: Not on file   Occupational History    Occupation: resp.  therapist   Tobacco Use    Smoking status: Never Smoker    Smokeless tobacco: Never Used   Vaping Use    Vaping Use: Never used   Substance and Sexual Activity    Alcohol use: No     Alcohol/week: 0.0 standard drinks    Drug use: No    Sexual activity: Not on file     Comment: single   Other Topics Concern    Not on file   Social History Narrative    Not on file     Social Determinants of Health     Financial Resource Strain: Low Risk     Difficulty of Paying Living Expenses: Not hard at all   Food Insecurity: No Food Insecurity    Worried About Running Out of Food in the Last Year: Never true    Judy of Food in the Last Year: Never true   Transportation Needs:     Lack of Transportation (Medical): Not on file    Lack of Transportation (Non-Medical):  Not on file   Physical Activity:     Days of Exercise per Week: Not on file    Minutes of Exercise per Session: Not on file   Stress:     Feeling of Stress : Not on file   Social Connections:     Frequency of Communication with Friends and Family: Not on file    Frequency of Social Gatherings with Friends and Family: Not on file    Attends Yarsanism Services: Not on file    Active Member of Clubs or Organizations: Not on file    Attends Club or Organization Meetings: Not on file    Marital Status: Not on file   Intimate Partner Violence:     Fear of Current or Ex-Partner: Not on file    Emotionally Abused: Not on file    Physically Abused: Not on file    Sexually Abused: Not on file   Housing Stability:     Unable to Pay for Housing in the Last Year: Not on file    Number of Places Lived in the Last Year: Not on file    Unstable Housing in the Last Year: Not on file     Family History   Problem Relation Age of Onset    Heart Disease Mother     Arthritis Mother     Dementia Mother     Depression Mother     Diabetes Father     Arthritis Father     Heart Disease Father     Colon Cancer Sister      Allergies   Allergen Reactions    Morphine     Invokana [Canagliflozin]      UTI and yeast infection     Shellfish-Derived Products Swelling       Current Outpatient Medications:     tamoxifen (NOLVADEX) 10 MG tablet, TAKE 1 TABLET BY MOUTH 2 TIMES A DAY, Disp: 180 tablet, Rfl: 0    telmisartan (MICARDIS) 80 MG tablet, TAKE 1 TABLET BY MOUTH ONE TIME A DAY, Disp: 90 tablet, Rfl: 0    Multiple Vitamin (MULTIVITAMIN ADULT PO), Take by mouth daily, Disp: , Rfl:     Na Sulfate-K Sulfate-Mg Sulf 17.5-3.13-1.6 GM/177ML SOLN, As directed, Disp: 1 each, Rfl: 0    potassium chloride (KLOR-CON) 10 MEQ extended release tablet, Take 3 tablets by mouth daily, Disp: 1 tablet, Rfl: 0   atenolol-chlorthalidone (TENORETIC) 100-25 MG per tablet, TAKE 1 TABLET BY MOUTH ONE TIME A DAY, Disp: 90 tablet, Rfl: 1    JANUMET  MG per tablet, TAKE 1 TABLET BY MOUTH 2 TIMES A DAY WITH MEALS, Disp: 180 tablet, Rfl: 3    Blood Glucose Monitoring Suppl (PRODIGY AUTOCODE BLOOD GLUCOSE) CATHRYN, Give 1 meter, Disp: 1 each, Rfl: 0    Lancet Devices (PRODIGY LANCING DEVICE) MISC, Give 1 device, Disp: 1 each, Rfl: 0    Prodigy Lancets 28G MISC, Test once daily, Disp: 100 each, Rfl: 3    blood glucose test strips (PRODIGY NO CODING BLOOD GLUC) strip, Test once daily, Disp: 100 each, Rfl: 3    Safety Lancets MISC, Safety pro lancets, use one daily, Disp: 100 each, Rfl: 3    blood glucose test strips (FREESTYLE INSULINX TEST) strip, Test once daily, Disp: 100 strip, Rfl: 3    eplerenone (INSPRA) 50 MG tablet, bid, Disp: 180 tablet, Rfl: 3    amLODIPine (NORVASC) 5 MG tablet, Take 1 tablet by mouth daily, Disp: 90 tablet, Rfl: 3    meloxicam (MOBIC) 15 MG tablet, Take 1 tablet by mouth daily, Disp: 30 tablet, Rfl: 3    levothyroxine (SYNTHROID) 125 MCG tablet, TAKE 1 TABLET BY MOUTH ONE TIME A DAY, Disp: 90 tablet, Rfl: 3    ketorolac (TORADOL) 10 MG tablet, Take 1 tablet by mouth every 6 hours as needed for Pain, Disp: 20 tablet, Rfl: 0    Blood Glucose Monitoring Suppl (AGAMATRIX PRESTO) w/Device KIT, Please give 1 meter dx e11.65, Disp: 1 kit, Rfl: 0    ketorolac (TORADOL) 10 MG tablet, Take 1 tablet by mouth every 6 hours as needed for Pain, Disp: 40 tablet, Rfl: 0  Lab Results   Component Value Date     12/02/2021    K 3.0 (LL) 12/02/2021     12/02/2021    CO2 28 12/02/2021    BUN 15 12/02/2021    CREATININE 0.89 12/02/2021    GLUCOSE 128 03/10/2022    CALCIUM 9.5 12/02/2021    PROT 7.5 12/02/2021    LABALBU 4.5 12/02/2021    BILITOT 0.4 12/02/2021    ALKPHOS 81 12/02/2021    AST 29 12/02/2021    ALT 25 12/02/2021    LABGLOM >60.0 12/02/2021    GFRAA >60.0 12/02/2021    GLOB 3.0 12/02/2021     Lab Results   Component Value Date    WBC 6.8 12/02/2021    HGB 14.1 12/02/2021    HCT 42.9 12/02/2021    MCV 79.3 (L) 12/02/2021     12/02/2021     Lab Results   Component Value Date    LABA1C 6.7 03/10/2022    LABA1C 6.9 (H) 12/02/2021    LABA1C 6.4 (H) 08/12/2021     Lab Results   Component Value Date    HDL 34 (L) 12/02/2021    HDL 31 (L) 09/01/2021    HDL 32 (L) 05/13/2019    LDLCALC 60 12/02/2021    LDLCALC 60 09/01/2021    LDLCALC 51 05/13/2019    CHOL 114 12/02/2021    CHOL 118 09/01/2021    CHOL 108 05/13/2019    TRIG 99 12/02/2021    TRIG 137 09/01/2021    TRIG 124 05/13/2019     Lab Results   Component Value Date    TESTM 1164 (H) 09/08/2020    TESTM 875 02/20/2020    TESTM 891 (H) 08/26/2019     Lab Results   Component Value Date    TSH 1.440 09/08/2020    TSH 1.370 02/20/2020    TSH 2.170 08/26/2019    TSHREFLEX 1.210 12/02/2021    TSHREFLEX 1.350 08/12/2021    TSHREFLEX 1.300 03/08/2021    T4FREE 1.46 12/02/2021    T4FREE 1.32 08/12/2021    T4FREE 1.44 03/08/2021     No results found for: TPOABS    Review of Systems   Endocrine: Negative. All other systems reviewed and are negative. Objective:   Physical Exam  Vitals reviewed. Constitutional:       Appearance: Normal appearance. He is obese. HENT:      Head: Normocephalic and atraumatic. Right Ear: External ear normal.      Left Ear: External ear normal.      Nose: Nose normal.   Eyes:      General: No scleral icterus. Right eye: No discharge. Left eye: No discharge. Extraocular Movements: Extraocular movements intact. Conjunctiva/sclera: Conjunctivae normal.   Cardiovascular:      Rate and Rhythm: Bradycardia present. Pulmonary:      Effort: Pulmonary effort is normal.   Musculoskeletal:         General: Normal range of motion. Cervical back: Normal range of motion and neck supple. Neurological:      General: No focal deficit present.       Mental Status: He is alert and oriented to person, place, and time.    Psychiatric:         Mood and Affect: Mood normal.         Behavior: Behavior normal.

## 2022-04-21 ENCOUNTER — OFFICE VISIT (OUTPATIENT)
Dept: FAMILY MEDICINE CLINIC | Age: 62
End: 2022-04-21
Payer: COMMERCIAL

## 2022-04-21 VITALS
HEIGHT: 72 IN | BODY MASS INDEX: 33.59 KG/M2 | HEART RATE: 52 BPM | DIASTOLIC BLOOD PRESSURE: 78 MMHG | SYSTOLIC BLOOD PRESSURE: 122 MMHG | TEMPERATURE: 97.1 F | WEIGHT: 248 LBS | OXYGEN SATURATION: 99 %

## 2022-04-21 DIAGNOSIS — M25.461 PAIN AND SWELLING OF RIGHT KNEE: ICD-10-CM

## 2022-04-21 DIAGNOSIS — E03.9 HYPOTHYROIDISM, UNSPECIFIED TYPE: ICD-10-CM

## 2022-04-21 DIAGNOSIS — M25.561 PAIN AND SWELLING OF RIGHT KNEE: ICD-10-CM

## 2022-04-21 DIAGNOSIS — I10 ESSENTIAL HYPERTENSION: ICD-10-CM

## 2022-04-21 DIAGNOSIS — N62 GYNECOMASTIA: ICD-10-CM

## 2022-04-21 DIAGNOSIS — E11.65 UNCONTROLLED TYPE 2 DIABETES MELLITUS WITH HYPERGLYCEMIA (HCC): ICD-10-CM

## 2022-04-21 DIAGNOSIS — E11.8 TYPE 2 DIABETES MELLITUS WITH COMPLICATION (HCC): ICD-10-CM

## 2022-04-21 DIAGNOSIS — Z12.5 SCREENING FOR PROSTATE CANCER: ICD-10-CM

## 2022-04-21 DIAGNOSIS — M25.561 ACUTE PAIN OF RIGHT KNEE: ICD-10-CM

## 2022-04-21 DIAGNOSIS — Z23 ENCOUNTER FOR IMMUNIZATION: ICD-10-CM

## 2022-04-21 DIAGNOSIS — M79.671 RIGHT FOOT PAIN: ICD-10-CM

## 2022-04-21 DIAGNOSIS — M72.2 PLANTAR FASCIITIS: ICD-10-CM

## 2022-04-21 DIAGNOSIS — R79.89 ELEVATED LIVER FUNCTION TESTS: Primary | ICD-10-CM

## 2022-04-21 DIAGNOSIS — E87.6 HYPOKALEMIA: ICD-10-CM

## 2022-04-21 LAB
ALBUMIN SERPL-MCNC: 4.4 G/DL (ref 3.5–4.6)
ALP BLD-CCNC: 83 U/L (ref 35–104)
ALT SERPL-CCNC: 30 U/L (ref 0–41)
ANION GAP SERPL CALCULATED.3IONS-SCNC: 14 MEQ/L (ref 9–15)
AST SERPL-CCNC: 31 U/L (ref 0–40)
BASOPHILS ABSOLUTE: 0 K/UL (ref 0–0.2)
BASOPHILS RELATIVE PERCENT: 0.5 %
BILIRUB SERPL-MCNC: 0.3 MG/DL (ref 0.2–0.7)
BUN BLDV-MCNC: 14 MG/DL (ref 8–23)
CALCIUM SERPL-MCNC: 9.7 MG/DL (ref 8.5–9.9)
CHLORIDE BLD-SCNC: 102 MEQ/L (ref 95–107)
CO2: 27 MEQ/L (ref 20–31)
CREAT SERPL-MCNC: 0.95 MG/DL (ref 0.7–1.2)
EOSINOPHILS ABSOLUTE: 0.1 K/UL (ref 0–0.7)
EOSINOPHILS RELATIVE PERCENT: 2.3 %
GFR AFRICAN AMERICAN: >60
GFR NON-AFRICAN AMERICAN: >60
GLOBULIN: 3 G/DL (ref 2.3–3.5)
GLUCOSE BLD-MCNC: 119 MG/DL (ref 70–99)
HCT VFR BLD CALC: 43.1 % (ref 42–52)
HEMOGLOBIN: 14.6 G/DL (ref 14–18)
LYMPHOCYTES ABSOLUTE: 2.3 K/UL (ref 1–4.8)
LYMPHOCYTES RELATIVE PERCENT: 42.5 %
MAGNESIUM: 2.3 MG/DL (ref 1.7–2.4)
MCH RBC QN AUTO: 26.7 PG (ref 27–31.3)
MCHC RBC AUTO-ENTMCNC: 33.9 % (ref 33–37)
MCV RBC AUTO: 78.9 FL (ref 80–100)
MONOCYTES ABSOLUTE: 0.5 K/UL (ref 0.2–0.8)
MONOCYTES RELATIVE PERCENT: 9.3 %
NEUTROPHILS ABSOLUTE: 2.4 K/UL (ref 1.4–6.5)
NEUTROPHILS RELATIVE PERCENT: 45.4 %
PDW BLD-RTO: 14.4 % (ref 11.5–14.5)
PLATELET # BLD: 292 K/UL (ref 130–400)
POTASSIUM SERPL-SCNC: 3.1 MEQ/L (ref 3.4–4.9)
RBC # BLD: 5.46 M/UL (ref 4.7–6.1)
SODIUM BLD-SCNC: 143 MEQ/L (ref 135–144)
TOTAL CK: 236 U/L (ref 0–190)
TOTAL PROTEIN: 7.4 G/DL (ref 6.3–8)
WBC # BLD: 5.4 K/UL (ref 4.8–10.8)

## 2022-04-21 PROCEDURE — 99214 OFFICE O/P EST MOD 30 MIN: CPT | Performed by: INTERNAL MEDICINE

## 2022-04-21 PROCEDURE — 3044F HG A1C LEVEL LT 7.0%: CPT | Performed by: INTERNAL MEDICINE

## 2022-04-21 RX ORDER — TELMISARTAN 80 MG/1
TABLET ORAL
Qty: 90 TABLET | Refills: 3 | Status: SHIPPED | OUTPATIENT
Start: 2022-04-21

## 2022-04-21 RX ORDER — BLOOD-GLUCOSE CONTROL, LOW
EACH MISCELLANEOUS
Qty: 1 EACH | Refills: 0 | Status: SHIPPED | OUTPATIENT
Start: 2022-04-21

## 2022-04-21 RX ORDER — KETOROLAC TROMETHAMINE 10 MG/1
10 TABLET, FILM COATED ORAL EVERY 6 HOURS PRN
Qty: 20 TABLET | Refills: 0 | Status: CANCELLED | OUTPATIENT
Start: 2022-04-21

## 2022-04-21 RX ORDER — MOMETASONE FUROATE 1 MG/G
CREAM TOPICAL
Qty: 30 G | Refills: 0 | Status: SHIPPED | OUTPATIENT
Start: 2022-04-21 | End: 2022-05-02 | Stop reason: SDUPTHER

## 2022-04-21 RX ORDER — BLOOD SUGAR DIAGNOSTIC
STRIP MISCELLANEOUS
Qty: 100 STRIP | Refills: 3 | Status: SHIPPED | OUTPATIENT
Start: 2022-04-21

## 2022-04-21 RX ORDER — LANCETS 26 GAUGE
EACH MISCELLANEOUS
Qty: 100 EACH | Refills: 3 | Status: SHIPPED | OUTPATIENT
Start: 2022-04-21

## 2022-04-21 RX ORDER — MELOXICAM 15 MG/1
15 TABLET ORAL DAILY
Qty: 30 TABLET | Refills: 3 | Status: CANCELLED | OUTPATIENT
Start: 2022-04-21

## 2022-04-21 RX ORDER — KETOROLAC TROMETHAMINE 10 MG/1
10 TABLET, FILM COATED ORAL EVERY 6 HOURS PRN
Qty: 40 TABLET | Refills: 0 | Status: CANCELLED | OUTPATIENT
Start: 2022-04-21 | End: 2022-05-01

## 2022-04-21 RX ORDER — TAMOXIFEN CITRATE 10 MG/1
TABLET ORAL
Qty: 180 TABLET | Refills: 0 | Status: CANCELLED | OUTPATIENT
Start: 2022-04-21

## 2022-04-21 RX ORDER — SITAGLIPTIN AND METFORMIN HYDROCHLORIDE 500; 50 MG/1; MG/1
TABLET, FILM COATED ORAL
Qty: 180 TABLET | Refills: 1 | Status: SHIPPED | OUTPATIENT
Start: 2022-04-21

## 2022-04-21 RX ORDER — ELECTROLYTES/DEXTROSE
SOLUTION, ORAL ORAL DAILY
Qty: 30 TABLET | Status: CANCELLED | OUTPATIENT
Start: 2022-04-21

## 2022-04-21 RX ORDER — BLOOD SUGAR DIAGNOSTIC
STRIP MISCELLANEOUS
Qty: 100 EACH | Refills: 3 | Status: SHIPPED | OUTPATIENT
Start: 2022-04-21 | End: 2022-10-05

## 2022-04-21 RX ORDER — ZOSTER VACCINE RECOMBINANT, ADJUVANTED 50 MCG/0.5
0.5 KIT INTRAMUSCULAR SEE ADMIN INSTRUCTIONS
Qty: 0.5 ML | Refills: 0 | Status: SHIPPED | OUTPATIENT
Start: 2022-04-21 | End: 2022-10-18

## 2022-04-21 RX ORDER — AMLODIPINE BESYLATE 5 MG/1
5 TABLET ORAL DAILY
Qty: 90 TABLET | Refills: 3 | Status: SHIPPED | OUTPATIENT
Start: 2022-04-21

## 2022-04-21 RX ORDER — BLOOD-GLUCOSE METER
EACH MISCELLANEOUS
Qty: 1 KIT | Refills: 0 | Status: SHIPPED | OUTPATIENT
Start: 2022-04-21

## 2022-04-21 RX ORDER — BLOOD-GLUCOSE METER
EACH MISCELLANEOUS
Qty: 1 EACH | Refills: 0 | Status: SHIPPED | OUTPATIENT
Start: 2022-04-21

## 2022-04-21 RX ORDER — LEVOTHYROXINE SODIUM 0.12 MG/1
TABLET ORAL
Qty: 90 TABLET | Refills: 3 | Status: SHIPPED | OUTPATIENT
Start: 2022-04-21

## 2022-04-21 RX ORDER — BLOOD-GLUCOSE CONTROL, LOW
EACH MISCELLANEOUS
Qty: 100 EACH | Refills: 3 | Status: SHIPPED | OUTPATIENT
Start: 2022-04-21

## 2022-04-21 RX ORDER — EPLERENONE 50 MG/1
TABLET, FILM COATED ORAL
Qty: 180 TABLET | Refills: 3 | Status: SHIPPED | OUTPATIENT
Start: 2022-04-21

## 2022-04-21 RX ORDER — ATENOLOL AND CHLORTHALIDONE TABLET 100; 25 MG/1; MG/1
TABLET ORAL
Qty: 90 TABLET | Refills: 3 | Status: SHIPPED | OUTPATIENT
Start: 2022-04-21

## 2022-04-21 RX ORDER — SODIUM, POTASSIUM,MAG SULFATES 17.5-3.13G
SOLUTION, RECONSTITUTED, ORAL ORAL
Qty: 1 EACH | Refills: 0 | Status: CANCELLED | OUTPATIENT
Start: 2022-04-21

## 2022-04-21 RX ORDER — POTASSIUM CHLORIDE 750 MG/1
10 TABLET, FILM COATED, EXTENDED RELEASE ORAL DAILY
Qty: 90 TABLET | Refills: 3 | Status: SHIPPED | OUTPATIENT
Start: 2022-04-21 | End: 2022-04-22

## 2022-04-21 ASSESSMENT — ENCOUNTER SYMPTOMS
BACK PAIN: 0
ABDOMINAL PAIN: 0
EYE PAIN: 0
SHORTNESS OF BREATH: 0

## 2022-04-21 NOTE — PROGRESS NOTES
Subjective:      Patient ID: Derik Manley is a 64 y.o. male who presents today with:  Chief Complaint   Patient presents with    6 Month Follow-Up     pt is here today for his 6mnthm f/u. .. pt did his labs       HPI    Past Medical History:   Diagnosis Date    Gynecomastia     History of hypokalemia     Hyperaldosteronism, unspecified ([address]     Hypertension     Hypogonadism male     Hypokalemia     Hypothyroidism     Osteoarthritis     Type II or unspecified type diabetes mellitus without mention of complication, not stated as uncontrolled      Past Surgical History:   Procedure Laterality Date    COLONOSCOPY N/A 12/17/2021    COLORECTAL CANCER SCREENING, HIGH RISK performed by Indra Carter MD at [address]      left quadracep tendon rupture repair w/ anchors    MANDIBLE FRACTURE SURGERY      TONSILLECTOMY       Social History     Socioeconomic History    Marital status: Single     Spouse name: Not on file    Number of children: Not on file    Years of education: Not on file    Highest education level: Not on file   Occupational History    Occupation: resp. therapist   Tobacco Use    Smoking status: Never Smoker    Smokeless tobacco: Never Used   Vaping Use    Vaping Use: Never used   Substance and Sexual Activity    Alcohol use: No     Alcohol/week: 0.0 standard drinks    Drug use: No    Sexual activity: Not on file     Comment: single   Other Topics Concern    Not on file   Social History Narrative    Not on file     Social Determinants of Health     Financial Resource Strain: Low Risk     Difficulty of Paying Living Expenses: Not hard at all   Food Insecurity: No Food Insecurity    Worried About Running Out of Food in the Last Year: Never true    Judy of Food in the Last Year: Never true   Transportation Needs:     Lack of Transportation (Medical): Not on file    Lack of Transportation (Non-Medical):  Not on file   Physical Activity:     Days of Exercise per Week: Not on file    Minutes of Exercise per Session: Not on file   Stress:     Feeling of Stress : Not on file   Social Connections:     Frequency of Communication with Friends and Family: Not on file    Frequency of Social Gatherings with Friends and Family: Not on file    Attends Nondenominational Services: Not on file    Active Member of [address] [place] [address] [employer] or Organizations: Not on file    Attends Club or Organization Meetings: Not on file    Marital Status: Not on file   Intimate Partner Violence:     Fear of Current or Ex-Partner: Not on file    Emotionally Abused: Not on file    Physically Abused: Not on file    Sexually Abused: Not on file   Housing Stability:     Unable to Pay for Housing in the Last Year: Not on file    Number of [place] in the Last Year: Not on file    Unstable Housing in the Last Year: Not on file     Allergies   Allergen Reactions    Morphine     Invokana [Canagliflozin]      UTI and yeast infection     Shellfish-Derived Products Swelling     Current Outpatient Medications on File Prior to Visit   Medication Sig Dispense Refill    tamoxifen (NOLVADEX) 10 MG tablet TAKE 1 TABLET BY MOUTH 2 TIMES A  tablet 0    telmisartan (MICARDIS) 80 MG tablet TAKE 1 TABLET BY MOUTH ONE TIME A DAY 90 tablet 0    Multiple Vitamin (MULTIVITAMIN ADULT PO) Take by mouth daily      Na Sulfate-K Sulfate-Mg Sulf 17.5-3.13-1.6 GM/177ML SOLN As directed 1 each 0    potassium chloride (KLOR-CON) 10 MEQ extended release tablet Take 3 tablets by mouth daily 1 tablet 0    atenolol-chlorthalidone (TENORETIC) 100-25 MG per tablet TAKE 1 TABLET BY MOUTH ONE TIME A DAY 90 tablet 1    JANUMET  MG per tablet TAKE 1 TABLET BY MOUTH 2 TIMES A DAY WITH MEALS 180 tablet 3    Blood Glucose Monitoring Suppl (PRODIGY AUTOCODE BLOOD GLUCOSE) CATHRYN Give 1 meter 1 each 0    Lancet Devices (PRODIGY LANCING DEVICE) MISC Give 1 device 1 each 0    Prodigy Lancets 28G MISC Test once daily 100 each 3  blood glucose test strips (PRODIGY NO CODING BLOOD GLUC) strip Test once daily 100 each 3    Safety Lancets MISC Safety pro lancets, use one daily 100 each 3    blood glucose test strips (FREESTYLE INSULINX TEST) strip Test once daily 100 strip 3    eplerenone (INSPRA) 50 MG tablet bid 180 tablet 3    amLODIPine (NORVASC) 5 MG tablet Take 1 tablet by mouth daily 90 tablet 3    meloxicam (MOBIC) 15 MG tablet Take 1 tablet by mouth daily 30 tablet 3    levothyroxine (SYNTHROID) 125 MCG tablet TAKE 1 TABLET BY MOUTH ONE TIME A DAY 90 tablet 3    Blood Glucose Monitoring Suppl (AGAMATRIX PRESTO) w/Device KIT Please give 1 meter dx e11.65 1 kit 0     No current facility-administered medications on file prior to visit. I have personally reviewed the ROS, PMH, PFH, and social history     Review of Systems   Constitutional: Negative for chills. HENT: Negative for congestion. Eyes: Negative for pain. Respiratory: Negative for shortness of breath. Cardiovascular: Negative for chest pain. Gastrointestinal: Negative for abdominal pain. Genitourinary: Negative for hematuria. Musculoskeletal: Negative for back pain. Allergic/Immunologic: Negative for immunocompromised state. Neurological: Negative for headaches. Psychiatric/Behavioral: Negative for hallucinations. Objective:   /78 (Site: Right Upper Arm, Position: Sitting, Cuff Size: Medium Adult)   Pulse 52   Temp 97.1 °F (36.2 °C) (Temporal)   Ht 6' (1.829 m)   Wt 248 lb (112.5 kg)   SpO2 99%   BMI 33.63 kg/m²     Physical Exam  Constitutional:       General: He is not in acute distress. Appearance: Normal appearance. He is not ill-appearing, toxic-appearing or diaphoretic. HENT:      Head: Normocephalic. Neck:      Vascular: No carotid bruit. Cardiovascular:      Rate and Rhythm: Normal rate and regular rhythm. Pulses: Normal pulses. Heart sounds: Normal heart sounds. No murmur heard.   No friction rub. No gallop. Pulmonary:      Effort: Pulmonary effort is normal. No respiratory distress. Breath sounds: Normal breath sounds. No wheezing, rhonchi or rales. Abdominal:      General: Abdomen is flat. There is no distension. Palpations: Abdomen is soft. Tenderness: There is no abdominal tenderness. There is no right CVA tenderness, left CVA tenderness, guarding or rebound. Musculoskeletal:      Cervical back: Neck supple. Right lower leg: No edema. Left lower leg: No edema. Skin:     General: Skin is warm. Findings: No erythema or rash. Comments: Circular/ovoid flesh colored macule over left medial cervical neck    Neurological:      Mental Status: He is alert. Psychiatric:         Mood and Affect: Mood normal.       Doesn't want foot exam.     Assessment:       Diagnosis Orders   1. Essential hypertension     2. Uncontrolled type 2 diabetes mellitus with hyperglycemia ([facility]     3. Hypothyroidism, unspecified type     4. Type 2 diabetes mellitus with complication (HCC)     5. Gynecomastia     6. Acute pain of right knee     7. Pain and swelling of right knee     8. Plantar fasciitis     9. Right foot pain           Plan:     vc  Continue chronic medications   Get labs as ordered through Dr. Jakie Kawasaki.  (HE Manages diabetes)   He will bring in his 2nd dose (missing)    Colonoscopy 12/2024 to 2026  Refused HERMAN  Increase potassium to 30 meq once daily                   No orders of the defined types were placed in this encounter. No orders of the defined types were placed in this encounter. He was only taking 10 mew potassium daily   30 minutes spent   Increase potassium to 30 meq once daiy.  (He did not do this)  Patient mentions rash already better  Will give some mometasone to see if it speeds it up  Explained not to use steroid cream on thin skin including to but not limited to face, genitals, etc as it can cause skin thinning and permanent damage   If anything should change or worsen call ASAP, don't wait for next scheduled appointment. No follow-ups on file.       Janine Foreman MD

## 2022-04-22 RX ORDER — POTASSIUM CHLORIDE 750 MG/1
30 TABLET, FILM COATED, EXTENDED RELEASE ORAL DAILY
Qty: 270 TABLET | Refills: 3 | Status: SHIPPED | OUTPATIENT
Start: 2022-04-22

## 2022-04-26 ENCOUNTER — TELEPHONE (OUTPATIENT)
Dept: FAMILY MEDICINE CLINIC | Age: 62
End: 2022-04-26

## 2022-05-02 RX ORDER — MOMETASONE FUROATE 1 MG/G
CREAM TOPICAL
Qty: 30 G | Refills: 0 | OUTPATIENT
Start: 2022-05-02

## 2022-05-02 RX ORDER — MOMETASONE FUROATE 1 MG/G
CREAM TOPICAL
Qty: 30 G | Refills: 0 | Status: SHIPPED | OUTPATIENT
Start: 2022-05-02 | End: 2022-09-07

## 2022-05-02 NOTE — TELEPHONE ENCOUNTER
Pharmacy requesting medication refill.  Please approve or deny this request.    Rx requested:  Requested Prescriptions     Pending Prescriptions Disp Refills    mometasone (ELOCON) 0.1 % cream 30 g 0     Sig: Apply thin layer topically daily to rash on left side of neck         Last Office Visit:   4/21/2022      Next Visit Date:  Future Appointments   Date Time Provider Jeremy Edgar   9/9/2022  9:00 AM Minor Alvarado MD [facility]

## 2022-05-10 RX ORDER — TAMOXIFEN CITRATE 10 MG/1
TABLET ORAL
Qty: 180 TABLET | Refills: 0 | Status: SHIPPED | OUTPATIENT
Start: 2022-05-10 | End: 2022-05-10 | Stop reason: SDUPTHER

## 2022-05-10 RX ORDER — TAMOXIFEN CITRATE 10 MG/1
TABLET ORAL
Qty: 180 TABLET | Refills: 1 | Status: SHIPPED | OUTPATIENT
Start: 2022-05-10 | End: 2022-08-15

## 2022-05-10 NOTE — TELEPHONE ENCOUNTER
Pharmacy requesting medication refill.  Please approve or deny this request.    Rx requested:  Requested Prescriptions     Pending Prescriptions Disp Refills    tamoxifen (NOLVADEX) 10 MG tablet [Pharmacy Med Name: TAMOXIFEN CITRATE 10MG TABS] 180 tablet 0     Sig: TAKE 1 TABLET BY MOUTH 2 TIMES A DAY         Last Office Visit:   3/10/2022      Next Visit Date:  Future Appointments   Date Time Provider Jeremy Edgar   9/9/2022  9:00 AM Owen Quintana MD [facility]

## 2022-08-15 RX ORDER — TAMOXIFEN CITRATE 10 MG/1
TABLET ORAL
Qty: 180 TABLET | Refills: 3 | Status: SHIPPED | OUTPATIENT
Start: 2022-08-15 | End: 2022-10-06 | Stop reason: SDUPTHER

## 2022-08-18 ENCOUNTER — OFFICE VISIT (OUTPATIENT)
Dept: FAMILY MEDICINE CLINIC | Age: 62
End: 2022-08-18
Payer: COMMERCIAL

## 2022-08-18 VITALS
WEIGHT: 255 LBS | BODY MASS INDEX: 33.8 KG/M2 | SYSTOLIC BLOOD PRESSURE: 122 MMHG | HEART RATE: 71 BPM | OXYGEN SATURATION: 97 % | TEMPERATURE: 97.3 F | DIASTOLIC BLOOD PRESSURE: 82 MMHG | HEIGHT: 73 IN

## 2022-08-18 DIAGNOSIS — M70.22 OLECRANON BURSITIS OF LEFT ELBOW: Primary | ICD-10-CM

## 2022-08-18 DIAGNOSIS — M70.22 OLECRANON BURSITIS OF LEFT ELBOW: ICD-10-CM

## 2022-08-18 LAB
BASOPHILS ABSOLUTE: 0 K/UL (ref 0–0.2)
BASOPHILS RELATIVE PERCENT: 0.5 %
C-REACTIVE PROTEIN, HIGH SENSITIVITY: 7.9 MG/L (ref 0–5)
EOSINOPHILS ABSOLUTE: 0.1 K/UL (ref 0–0.7)
EOSINOPHILS RELATIVE PERCENT: 1.9 %
HCT VFR BLD CALC: 44.7 % (ref 42–52)
HEMOGLOBIN: 14.7 G/DL (ref 14–18)
LYMPHOCYTES ABSOLUTE: 2 K/UL (ref 1–4.8)
LYMPHOCYTES RELATIVE PERCENT: 32.5 %
MCH RBC QN AUTO: 26.4 PG (ref 27–31.3)
MCHC RBC AUTO-ENTMCNC: 33 % (ref 33–37)
MCV RBC AUTO: 80 FL (ref 80–100)
MONOCYTES ABSOLUTE: 0.5 K/UL (ref 0.2–0.8)
MONOCYTES RELATIVE PERCENT: 9.1 %
NEUTROPHILS ABSOLUTE: 3.4 K/UL (ref 1.4–6.5)
NEUTROPHILS RELATIVE PERCENT: 56 %
PDW BLD-RTO: 14.6 % (ref 11.5–14.5)
PLATELET # BLD: 271 K/UL (ref 130–400)
RBC # BLD: 5.58 M/UL (ref 4.7–6.1)
URIC ACID, SERUM: 6.8 MG/DL (ref 3.4–7)
WBC # BLD: 6 K/UL (ref 4.8–10.8)

## 2022-08-18 PROCEDURE — 99214 OFFICE O/P EST MOD 30 MIN: CPT | Performed by: NURSE PRACTITIONER

## 2022-08-18 RX ORDER — CEPHALEXIN 500 MG/1
500 CAPSULE ORAL 4 TIMES DAILY
Qty: 28 CAPSULE | Refills: 0 | Status: SHIPPED | OUTPATIENT
Start: 2022-08-18 | End: 2022-08-25

## 2022-08-18 RX ORDER — SULFAMETHOXAZOLE AND TRIMETHOPRIM 800; 160 MG/1; MG/1
1 TABLET ORAL 2 TIMES DAILY
Qty: 14 TABLET | Refills: 0 | Status: SHIPPED | OUTPATIENT
Start: 2022-08-18 | End: 2022-08-25

## 2022-08-18 RX ORDER — COLCHICINE 0.6 MG/1
0.6 TABLET ORAL 2 TIMES DAILY
Qty: 3 TABLET | Refills: 0 | Status: SHIPPED | OUTPATIENT
Start: 2022-08-18 | End: 2022-08-19

## 2022-08-18 SDOH — ECONOMIC STABILITY: FOOD INSECURITY: WITHIN THE PAST 12 MONTHS, YOU WORRIED THAT YOUR FOOD WOULD RUN OUT BEFORE YOU GOT MONEY TO BUY MORE.: NEVER TRUE

## 2022-08-18 SDOH — ECONOMIC STABILITY: FOOD INSECURITY: WITHIN THE PAST 12 MONTHS, THE FOOD YOU BOUGHT JUST DIDN'T LAST AND YOU DIDN'T HAVE MONEY TO GET MORE.: NEVER TRUE

## 2022-08-18 ASSESSMENT — ENCOUNTER SYMPTOMS
PHOTOPHOBIA: 0
COUGH: 0
EYE PAIN: 0
SORE THROAT: 0
BACK PAIN: 0
ABDOMINAL PAIN: 0
EYE REDNESS: 0
VOMITING: 0
NAUSEA: 0
DIARRHEA: 0
WHEEZING: 0
SHORTNESS OF BREATH: 0

## 2022-08-18 ASSESSMENT — PATIENT HEALTH QUESTIONNAIRE - PHQ9
SUM OF ALL RESPONSES TO PHQ QUESTIONS 1-9: 0
SUM OF ALL RESPONSES TO PHQ QUESTIONS 1-9: 0
1. LITTLE INTEREST OR PLEASURE IN DOING THINGS: 0
SUM OF ALL RESPONSES TO PHQ QUESTIONS 1-9: 0
SUM OF ALL RESPONSES TO PHQ QUESTIONS 1-9: 0
2. FEELING DOWN, DEPRESSED OR HOPELESS: 0
SUM OF ALL RESPONSES TO PHQ9 QUESTIONS 1 & 2: 0

## 2022-08-18 ASSESSMENT — SOCIAL DETERMINANTS OF HEALTH (SDOH): HOW HARD IS IT FOR YOU TO PAY FOR THE VERY BASICS LIKE FOOD, HOUSING, MEDICAL CARE, AND HEATING?: NOT HARD AT ALL

## 2022-08-18 NOTE — PROGRESS NOTES
Expenses: Not hard at all   Food Insecurity: No Food Insecurity    Worried About Running Out of Food in the Last Year: Never true    Ran Out of Food in the Last Year: Never true   Transportation Needs: Not on file   Physical Activity: Not on file   Stress: Not on file   Social Connections: Not on file   Intimate Partner Violence: Not on file   Housing Stability: Not on file     Current Outpatient Medications on File Prior to Visit   Medication Sig Dispense Refill    tamoxifen (NOLVADEX) 10 MG tablet TAKE 1 TABLET BY MOUTH 2 TIMES A  tablet 3    mometasone (ELOCON) 0.1 % cream Apply thin layer topically daily to rash on left side of neck 30 g 0    potassium chloride (KLOR-CON) 10 MEQ extended release tablet Take 3 tablets by mouth daily 270 tablet 3    atenolol-chlorthalidone (TENORETIC) 100-25 MG per tablet TAKE 1 TABLET BY MOUTH ONE TIME A DAY 90 tablet 3    amLODIPine (NORVASC) 5 MG tablet Take 1 tablet by mouth daily 90 tablet 3    Blood Glucose Monitoring Suppl ([employer]) w/Device KIT Please give 1 meter dx e11.65 1 kit 0    Blood Glucose Monitoring Suppl (PRODIGY AUTOCODE BLOOD GLUCOSE) CATHRYN Give 1 meter 1 each 0    blood glucose test strips (FREESTYLE INSULINX TEST) strip Test once daily 100 strip 3    blood glucose test strips (PRODIGY NO CODING BLOOD GLUC) strip Test once daily 100 each 3    eplerenone (INSPRA) 50 MG tablet Take one tablet po bid 180 tablet 3    sitaGLIPtan-metFORMIN (JANUMET)  MG per tablet TAKE 1 TABLET BY MOUTH 2 TIMES A DAY WITH MEALS 180 tablet 1    Lancet Devices (PRODIGY LANCING DEVICE) MISC Give 1 device 1 each 0    levothyroxine (SYNTHROID) 125 MCG tablet TAKE 1 TABLET BY MOUTH ONE TIME A DAY 90 tablet 3    Prodigy Lancets 28G MISC Test once daily 100 each 3    Safety Lancets MISC Safety pro lancets, use one daily 100 each 3    telmisartan (MICARDIS) 80 MG tablet TAKE 1 TABLET BY MOUTH ONE TIME A DAY 90 tablet 3    zoster recombinant adjuvanted vaccine [facility]) 50 MCG/0.5ML SUSR injection Inject 0.5 mLs into the muscle See Admin Instructions 1 dose now and repeat in 2-6 months 0.5 mL 0    Multiple Vitamin (MULTIVITAMIN ADULT PO) Take by mouth daily      meloxicam (MOBIC) 15 MG tablet Take 1 tablet by mouth daily 30 tablet 3     No current facility-administered medications on file prior to visit.     :  Morphine, Invokana [canagliflozin], and Shellfish-derived products    Review of Systems   Constitutional:  Negative for chills, diaphoresis and fever. HENT:  Negative for congestion, sore throat and tinnitus. Eyes:  Negative for photophobia, pain and redness. Respiratory:  Negative for cough, shortness of breath and wheezing. Cardiovascular:  Negative for chest pain, palpitations and leg swelling. Gastrointestinal:  Negative for abdominal pain, diarrhea, nausea and vomiting. Genitourinary:  Negative for dysuria, flank pain, frequency and urgency. Musculoskeletal:  Positive for arthralgias (elbow pain). Negative for back pain and myalgias. Skin:  Negative for rash. Neurological:  Negative for dizziness, tremors, seizures, weakness and headaches. Hematological:  Does not bruise/bleed easily. Psychiatric/Behavioral:  The patient is not nervous/anxious. Objective:   /82   Pulse 71   Temp 97.3 °F (36.3 °C) (Infrared)   Ht 6' 1\" (1.854 m)   Wt 255 lb (115.7 kg)   SpO2 97%   BMI 33.64 kg/m²     Physical Exam  Constitutional:       General: He is not in acute distress. Appearance: He is well-developed. He is not diaphoretic. HENT:      Head: Normocephalic and atraumatic. Mouth/Throat:      Pharynx: No oropharyngeal exudate. Eyes:      General:         Right eye: No discharge. Left eye: No discharge. Conjunctiva/sclera: Conjunctivae normal.      Pupils: Pupils are equal, round, and reactive to light. Neck:      Thyroid: No thyromegaly. Trachea: No tracheal deviation.    Cardiovascular:      Rate and Rhythm: Normal rate and regular rhythm. Heart sounds: Normal heart sounds. No murmur heard. Pulmonary:      Effort: Pulmonary effort is normal. No respiratory distress. Breath sounds: Normal breath sounds. No wheezing or rales. Chest:      Chest wall: No tenderness. Abdominal:      General: Bowel sounds are normal.      Palpations: Abdomen is soft. Tenderness: There is no abdominal tenderness. There is no guarding or rebound. Musculoskeletal:         General: No deformity. Normal range of motion. Right elbow: No swelling, deformity, effusion or lacerations. Normal range of motion. Left elbow: Swelling present. No deformity, effusion or lacerations. Normal range of motion. Tenderness present. Arms:       Cervical back: Normal range of motion and neck supple. Comments: Tender to touch   Able to do full ROM   As long as back of elbow id not touched- not painful   Lymphadenopathy:      Cervical: No cervical adenopathy. Skin:     General: Skin is warm and dry. Findings: No erythema or rash. Neurological:      Mental Status: He is alert and oriented to person, place, and time. Cranial Nerves: No cranial nerve deficit. Coordination: Coordination normal.       Procedures   :       Diagnosis Orders   1.  Olecranon bursitis of left elbow  XR ELBOW LEFT (MIN 3 VIEWS)    CBC with Auto Differential    Uric Acid    High Sensitivity Crp    EMMANUEL Barajas MD, Orthopaedic Surgery    colchicine (COLCRYS) 0.6 MG tablet    cephALEXin (KEFLEX) 500 MG capsule    sulfamethoxazole-trimethoprim (BACTRIM DS) 800-160 MG per tablet          :      Orders Placed This Encounter   Procedures    XR ELBOW LEFT (MIN 3 VIEWS)     Standing Status:   Future     Standing Expiration Date:   8/19/2022    CBC with Auto Differential     Standing Status:   Future     Standing Expiration Date:   8/18/2023    Uric Acid     Standing Status:   Future     Standing Expiration Date: 8/18/2023    High Sensitivity Crp     Standing Status:   Future     Standing Expiration Date:   8/18/2023    EMMANUEL - Cassandra Shearer MD, Orthopaedic Surgery     Referral Priority:   Routine     Referral Type:   Eval and Treat     Referral Reason:   Specialty Services Required     Referred to Provider:   Jeff Marinelli MD     Requested Specialty:   Orthopedic Surgery     Number of Visits Requested:   1       No fever or chills. Normal ROM   There is no noted discoloration of skin   There is swelling over olecranon bursa   Does not appear to be in joint at this time  We spoke with pt   We well ad labs   Xray  We will have him take 1.8 oc colchicine today   If better watch it if not start antibiotic and see ortho  Referral made and meds sent   If unable to get appointment with ortho- we told pt to directly go into the office- since it is Friday tomorrow  Orders Placed This Encounter   Medications    colchicine (COLCRYS) 0.6 MG tablet     Sig: Take 1 tablet by mouth 2 times daily for 1 day     Dispense:  3 tablet     Refill:  0    cephALEXin (KEFLEX) 500 MG capsule     Sig: Take 1 capsule by mouth 4 times daily for 7 days     Dispense:  28 capsule     Refill:  0    sulfamethoxazole-trimethoprim (BACTRIM DS) 800-160 MG per tablet     Sig: Take 1 tablet by mouth 2 times daily for 7 days     Dispense:  14 tablet     Refill:  0         Return in about 1 day (around 8/19/2022), or tomorrow with ortho if not better. Reviewed with the patient: current clinicalstatus, medications, activities and diet. Side effects, adverse effects of the medication prescribedtoday, as well as treatment plan/ rationale and result expectations have been discussedwith the patient who expresses understanding and desires to proceed. Close follow upto evaluate treatment results and for coordination of care. I have reviewedthe patient's medical history in detail and updated the computerized patient record.     JOSUE Stein - CNP

## 2022-09-07 ENCOUNTER — OFFICE VISIT (OUTPATIENT)
Dept: FAMILY MEDICINE CLINIC | Age: 62
End: 2022-09-07
Payer: COMMERCIAL

## 2022-09-07 VITALS
TEMPERATURE: 98.2 F | OXYGEN SATURATION: 98 % | SYSTOLIC BLOOD PRESSURE: 124 MMHG | DIASTOLIC BLOOD PRESSURE: 84 MMHG | RESPIRATION RATE: 14 BRPM | HEIGHT: 73 IN | WEIGHT: 255 LBS | BODY MASS INDEX: 33.8 KG/M2 | HEART RATE: 62 BPM

## 2022-09-07 DIAGNOSIS — J02.9 SORE THROAT: ICD-10-CM

## 2022-09-07 DIAGNOSIS — J02.9 SORE THROAT: Primary | ICD-10-CM

## 2022-09-07 DIAGNOSIS — Z20.822 ENCOUNTER FOR LABORATORY TESTING FOR COVID-19 VIRUS: ICD-10-CM

## 2022-09-07 DIAGNOSIS — R21 SKIN RASH: ICD-10-CM

## 2022-09-07 DIAGNOSIS — R53.83 FATIGUE, UNSPECIFIED TYPE: ICD-10-CM

## 2022-09-07 LAB
INFLUENZA A ANTIBODY: NORMAL
INFLUENZA B ANTIBODY: NORMAL
Lab: NORMAL
PERFORMING INSTRUMENT: NORMAL
QC PASS/FAIL: NORMAL
S PYO AG THROAT QL: NORMAL
SARS-COV-2, POC: NORMAL

## 2022-09-07 PROCEDURE — 87804 INFLUENZA ASSAY W/OPTIC: CPT | Performed by: NURSE PRACTITIONER

## 2022-09-07 PROCEDURE — 87880 STREP A ASSAY W/OPTIC: CPT | Performed by: NURSE PRACTITIONER

## 2022-09-07 PROCEDURE — 87426 SARSCOV CORONAVIRUS AG IA: CPT | Performed by: NURSE PRACTITIONER

## 2022-09-07 PROCEDURE — 99213 OFFICE O/P EST LOW 20 MIN: CPT | Performed by: NURSE PRACTITIONER

## 2022-09-07 RX ORDER — MOMETASONE FUROATE 1 MG/G
CREAM TOPICAL
Qty: 50 G | Refills: 0 | Status: SHIPPED | OUTPATIENT
Start: 2022-09-07

## 2022-09-07 ASSESSMENT — ENCOUNTER SYMPTOMS
VOMITING: 0
DIARRHEA: 0
RHINORRHEA: 0
SORE THROAT: 1
WHEEZING: 0
COUGH: 0
NAUSEA: 0
SHORTNESS OF BREATH: 0

## 2022-09-07 NOTE — PROGRESS NOTES
Subjective:      Patient ID: Aubrey Gauthier is a 64 y.o. male who presents today for:  Chief Complaint   Patient presents with    Pharyngitis     Joint pain, fatigue sx started yesterday- states he has a rash that he has had for a while that he would like a cream for       Pharyngitis  Associated symptoms include arthralgias, congestion, fatigue, a rash and a sore throat. Pertinent negatives include no chills, coughing, fever, headaches, myalgias, nausea or vomiting. Joint pain  Exhausted   He wanted tested for everything  He is a resp therapist and works with covid   He feels very warm he says but no fever   He wants to make sure its not early covid     Rash that he thinks is psoriasis on the left chest   He got a steroid cream form a co worker and has been using that and says it helped   Really better now but would like to have a cream      Past Surgical History:   Procedure Laterality Date    COLONOSCOPY N/A 12/17/2021    COLORECTAL CANCER SCREENING, HIGH RISK performed by Kemar De La O MD at [address] [place] [address]      left quadracep tendon rupture repair w/ anchors    MANDIBLE FRACTURE SURGERY      TONSILLECTOMY       Social History     Socioeconomic History    Marital status: Single     Spouse name: Not on file    Number of children: Not on file    Years of education: Not on file    Highest education level: Not on file   Occupational History    Occupation: resp.  therapist   Tobacco Use    Smoking status: Never    Smokeless tobacco: Never   Vaping Use    Vaping Use: Never used   Substance and Sexual Activity    Alcohol use: No     Alcohol/week: 0.0 standard drinks    Drug use: No    Sexual activity: Not on file     Comment: single   Other Topics Concern    Not on file   Social History Narrative    Not on file     Social Determinants of Health     Financial Resource Strain: Low Risk     Difficulty of Paying Living Expenses: Not hard at all   Food Insecurity: No Food Insecurity Worried About Running Out of Food in the Last Year: Never true    Ran Out of Food in the Last Year: Never true   Transportation Needs: Not on file   Physical Activity: Not on file   Stress: Not on file   Social Connections: Not on file   Intimate Partner Violence: Not on file   Housing Stability: Not on file     Family History   Problem Relation Age of Onset    Heart Disease Mother     Arthritis Mother     Dementia Mother     Depression Mother     Diabetes Father     Arthritis Father     Heart Disease Father     Colon Cancer Sister      Allergies   Allergen Reactions    Morphine     Invokana [Canagliflozin]      UTI and yeast infection     Shellfish-Derived Products Swelling     Current Outpatient Medications   Medication Sig Dispense Refill    mometasone (ELOCON) 0.1 % cream Apply topically twice daily 50 g 0    tamoxifen (NOLVADEX) 10 MG tablet TAKE 1 TABLET BY MOUTH 2 TIMES A  tablet 3    potassium chloride (KLOR-CON) 10 MEQ extended release tablet Take 3 tablets by mouth daily 270 tablet 3    atenolol-chlorthalidone (TENORETIC) 100-25 MG per tablet TAKE 1 TABLET BY MOUTH ONE TIME A DAY 90 tablet 3    amLODIPine (NORVASC) 5 MG tablet Take 1 tablet by mouth daily 90 tablet 3    Blood Glucose Monitoring Suppl (AGAMATRIX PRESTO) w/Device KIT Please give 1 meter dx e11.65 1 kit 0    Blood Glucose Monitoring Suppl (PRODIGY AUTOCODE BLOOD GLUCOSE) CATHRYN Give 1 meter 1 each 0    blood glucose test strips (FREESTYLE INSULINX TEST) strip Test once daily 100 strip 3    blood glucose test strips (PRODIGY NO CODING BLOOD GLUC) strip Test once daily 100 each 3    eplerenone (INSPRA) 50 MG tablet Take one tablet po bid 180 tablet 3    sitaGLIPtan-metFORMIN (JANUMET)  MG per tablet TAKE 1 TABLET BY MOUTH 2 TIMES A DAY WITH MEALS 180 tablet 1    Lancet Devices (PRODIGY LANCING DEVICE) MISC Give 1 device 1 each 0    levothyroxine (SYNTHROID) 125 MCG tablet TAKE 1 TABLET BY MOUTH ONE TIME A DAY 90 tablet 3    Prodigy Lancets 28G MISC Test once daily 100 each 3    Safety Lancets MISC Safety pro lancets, use one daily 100 each 3    telmisartan (MICARDIS) 80 MG tablet TAKE 1 TABLET BY MOUTH ONE TIME A DAY 90 tablet 3    zoster recombinant adjuvanted vaccine (SHINGRIX) 50 MCG/0.5ML SUSR injection Inject 0.5 mLs into the muscle See Admin Instructions 1 dose now and repeat in 2-6 months 0.5 mL 0    Multiple Vitamin (MULTIVITAMIN ADULT PO) Take by mouth daily      meloxicam (MOBIC) 15 MG tablet Take 1 tablet by mouth daily 30 tablet 3    colchicine (COLCRYS) 0.6 MG tablet Take 1 tablet by mouth 2 times daily for 1 day 3 tablet 0     No current facility-administered medications for this visit. Review of Systems   Constitutional:  Positive for appetite change and fatigue. Negative for chills and fever. HENT:  Positive for congestion and sore throat. Negative for rhinorrhea. Respiratory:  Negative for cough, shortness of breath and wheezing. Gastrointestinal:  Negative for diarrhea, nausea and vomiting. Musculoskeletal:  Positive for arthralgias. Negative for myalgias. Skin:  Positive for rash. Neurological:  Negative for dizziness, light-headedness and headaches. Psychiatric/Behavioral:  Negative for agitation, confusion and hallucinations. Objective:   /84 (Site: Right Upper Arm, Position: Sitting, Cuff Size: Large Adult)   Pulse 62   Temp 98.2 °F (36.8 °C) (Temporal)   Resp 14   Ht 6' 1\" (1.854 m)   Wt 255 lb (115.7 kg)   SpO2 98%   BMI 33.64 kg/m²     Physical Exam  Vitals reviewed. Constitutional:       Appearance: Normal appearance. HENT:      Head: Normocephalic and atraumatic. Right Ear: Hearing, tympanic membrane, ear canal and external ear normal. No middle ear effusion. No foreign body. Tympanic membrane is not injected, erythematous or bulging. Left Ear: Hearing, tympanic membrane, ear canal and external ear normal.  No middle ear effusion. No foreign body.  Tympanic membrane is not injected, erythematous or bulging. Nose: Nose normal. No congestion or rhinorrhea. Right Nostril: No foreign body. Left Nostril: No foreign body. Right Turbinates: Not enlarged. Left Turbinates: Not enlarged. Right Sinus: No maxillary sinus tenderness or frontal sinus tenderness. Left Sinus: No maxillary sinus tenderness or frontal sinus tenderness. Mouth/Throat:      Lips: Pink. Mouth: Mucous membranes are moist.      Pharynx: Oropharynx is clear. Uvula midline. No pharyngeal swelling, oropharyngeal exudate, posterior oropharyngeal erythema or uvula swelling. Tonsils: No tonsillar exudate or tonsillar abscesses. Eyes:      General: Lids are normal.         Right eye: No foreign body. Left eye: No foreign body. Extraocular Movements: Extraocular movements intact. Conjunctiva/sclera: Conjunctivae normal.   Cardiovascular:      Rate and Rhythm: Normal rate and regular rhythm. Heart sounds: Normal heart sounds. Pulmonary:      Effort: Pulmonary effort is normal. No tachypnea, accessory muscle usage or respiratory distress. Breath sounds: Decreased breath sounds present. No wheezing or rhonchi. Chest:   Breasts:     Right: No supraclavicular adenopathy. Left: No supraclavicular adenopathy. Abdominal:      Tenderness: There is no abdominal tenderness. There is no guarding. Musculoskeletal:         General: Normal range of motion. Cervical back: Normal range of motion. Lymphadenopathy:      Cervical: No cervical adenopathy. Upper Body:      Right upper body: No supraclavicular adenopathy. Left upper body: No supraclavicular adenopathy. Skin:     General: Skin is warm and dry. Capillary Refill: Capillary refill takes less than 2 seconds. Neurological:      General: No focal deficit present. Mental Status: He is alert and oriented to person, place, and time.       Cranial Nerves: Cranial nerves are intact. Gait: Gait is intact. Psychiatric:         Mood and Affect: Mood normal.         Speech: Speech normal.         Behavior: Behavior normal. Behavior is cooperative. Thought Content: Thought content normal.         Judgment: Judgment normal.       Assessment:       Diagnosis Orders   1. Sore throat  POCT COVID-19, Antigen    POCT Influenza A/B    POCT rapid strep A    Culture, Throat    COVID-19      2. Skin rash  mometasone (ELOCON) 0.1 % cream      3. Fatigue, unspecified type        4. Encounter for laboratory testing for COVID-19 virus          Results for POC orders placed in visit on 09/07/22   POCT Influenza A/B   Result Value Ref Range    Influenza A Ab neg     Influenza B Ab neg    POCT rapid strep A   Result Value Ref Range    Strep A Ag None Detected None Detected      Plan:     Assessment & Plan   Michela Slater was seen today for pharyngitis. Diagnoses and all orders for this visit:    Sore throat  -     POCT COVID-19, Antigen  -     POCT Influenza A/B  -     POCT rapid strep A  -     Culture, Throat; Future  -     COVID-19; Future    Skin rash  -     mometasone (ELOCON) 0.1 % cream; Apply topically twice daily    Fatigue, unspecified type    Encounter for laboratory testing for COVID-19 virus    Orders Placed This Encounter   Procedures    Culture, Throat     Standing Status:   Future     Number of Occurrences:   1     Standing Expiration Date:   9/7/2023    COVID-19     Standing Status:   Future     Standing Expiration Date:   9/7/2023     Scheduling Instructions:      1) Due to current limited availability of the COVID-19 test, tests will be prioritized based on responses to questions above. Testing may be delayed due to volume. 2) Print and instruct patient to adhere to CDC home isolation program. (Link Above)              3) Set up or refer patient for a monitoring program.              4) Have patient sign up for and leverage Dagne Doverhart (if not previously done). Order Specific Question:   Is this test for diagnosis or screening? Answer:   Diagnosis of ill patient     Order Specific Question:   Symptomatic for COVID-19 as defined by CDC? Answer:   Yes     Order Specific Question:   Date of Symptom Onset     Answer:   9/6/2022     Order Specific Question:   Hospitalized for COVID-19? Answer:   No     Order Specific Question:   Admitted to ICU for COVID-19? Answer:   No     Order Specific Question:   Employed in healthcare setting? Answer:   No     Order Specific Question:   Resident in a congregate (group) care setting? Answer:   No     Order Specific Question:   Pregnant: Answer:   No    POCT COVID-19, Antigen     Order Specific Question:   Is this test for diagnosis or screening? Answer:   Diagnosis of ill patient     Order Specific Question:   Symptomatic for COVID-19 as defined by CDC? Answer:   Yes     Order Specific Question:   Date of Symptom Onset     Answer:   9/6/2022     Order Specific Question:   Hospitalized for COVID-19? Answer:   No     Order Specific Question:   Admitted to ICU for COVID-19? Answer:   No     Order Specific Question:   Employed in healthcare setting? Answer:   No     Order Specific Question:   Resident in a congregate (group) care setting? Answer:   No     Order Specific Question:   Pregnant: Answer:   No    POCT Influenza A/B    POCT rapid strep A     Orders Placed This Encounter   Medications    mometasone (ELOCON) 0.1 % cream     Sig: Apply topically twice daily     Dispense:  50 g     Refill:  0       Medications Discontinued During This Encounter   Medication Reason    mometasone (ELOCON) 0.1 % cream LIST CLEANUP     Return if symptoms worsen or fail to improve. Reviewed with the patient/family: current clinical status & medications.   Side effects of the medication prescribed today, as well as treatment plan/rationale and result expectations have been discussed with the

## 2022-09-08 LAB — SARS-COV-2, PCR: NOT DETECTED

## 2022-09-09 LAB — THROAT CULTURE: NORMAL

## 2022-10-03 DIAGNOSIS — I10 PRIMARY HYPERTENSION: ICD-10-CM

## 2022-10-03 DIAGNOSIS — E11.65 UNCONTROLLED TYPE 2 DIABETES MELLITUS WITH HYPERGLYCEMIA (HCC): ICD-10-CM

## 2022-10-03 DIAGNOSIS — Z12.5 SCREENING FOR PROSTATE CANCER: ICD-10-CM

## 2022-10-03 DIAGNOSIS — E03.9 HYPOTHYROIDISM, UNSPECIFIED TYPE: ICD-10-CM

## 2022-10-03 DIAGNOSIS — E87.6 HYPOKALEMIA: ICD-10-CM

## 2022-10-03 LAB
ALBUMIN SERPL-MCNC: 4.2 G/DL (ref 3.5–4.6)
ALP BLD-CCNC: 98 U/L (ref 35–104)
ALT SERPL-CCNC: 54 U/L (ref 0–41)
ANION GAP SERPL CALCULATED.3IONS-SCNC: 12 MEQ/L (ref 9–15)
AST SERPL-CCNC: 47 U/L (ref 0–40)
BILIRUB SERPL-MCNC: 0.3 MG/DL (ref 0.2–0.7)
BUN BLDV-MCNC: 14 MG/DL (ref 8–23)
CALCIUM SERPL-MCNC: 9.1 MG/DL (ref 8.5–9.9)
CHLORIDE BLD-SCNC: 102 MEQ/L (ref 95–107)
CO2: 28 MEQ/L (ref 20–31)
CREAT SERPL-MCNC: 0.85 MG/DL (ref 0.7–1.2)
GFR AFRICAN AMERICAN: >60
GFR NON-AFRICAN AMERICAN: >60
GLOBULIN: 2.8 G/DL (ref 2.3–3.5)
GLUCOSE BLD-MCNC: 124 MG/DL (ref 70–99)
HBA1C MFR BLD: 7.7 % (ref 4.8–5.9)
POTASSIUM SERPL-SCNC: 3.1 MEQ/L (ref 3.4–4.9)
PROSTATE SPECIFIC ANTIGEN: 1.43 NG/ML (ref 0–4)
SODIUM BLD-SCNC: 142 MEQ/L (ref 135–144)
T4 FREE: 1.43 NG/DL (ref 0.84–1.68)
TOTAL PROTEIN: 7 G/DL (ref 6.3–8)
TSH REFLEX: 2.01 UIU/ML (ref 0.44–3.86)

## 2022-10-04 ENCOUNTER — TELEPHONE (OUTPATIENT)
Dept: FAMILY MEDICINE CLINIC | Age: 62
End: 2022-10-04

## 2022-10-04 DIAGNOSIS — E03.9 HYPOTHYROIDISM, UNSPECIFIED TYPE: ICD-10-CM

## 2022-10-04 DIAGNOSIS — N62 GYNECOMASTIA: ICD-10-CM

## 2022-10-04 DIAGNOSIS — E11.8 TYPE 2 DIABETES MELLITUS WITH COMPLICATION (HCC): ICD-10-CM

## 2022-10-04 NOTE — TELEPHONE ENCOUNTER
If he is taking potassium 30 meq daily  I would recommend increasing to 30 meq bid and then get recheck cmp in one week  Pend new rx please

## 2022-10-04 NOTE — PROGRESS NOTES
Done  Make sure he is aware  If patient cannot be contacted after 2 phone calls and voicemail messages, please mail letter requesting they contact our office.

## 2022-10-05 RX ORDER — LANCETS 28 GAUGE
EACH MISCELLANEOUS
Qty: 100 EACH | Refills: 3 | Status: SHIPPED | OUTPATIENT
Start: 2022-10-05 | End: 2022-10-06 | Stop reason: SDUPTHER

## 2022-10-05 RX ORDER — BLOOD SUGAR DIAGNOSTIC
STRIP MISCELLANEOUS
Qty: 100 STRIP | Refills: 3 | Status: SHIPPED | OUTPATIENT
Start: 2022-10-05 | End: 2022-10-06 | Stop reason: SDUPTHER

## 2022-10-06 ENCOUNTER — OFFICE VISIT (OUTPATIENT)
Dept: ENDOCRINOLOGY | Age: 62
End: 2022-10-06
Payer: COMMERCIAL

## 2022-10-06 VITALS
SYSTOLIC BLOOD PRESSURE: 142 MMHG | WEIGHT: 252 LBS | BODY MASS INDEX: 33.4 KG/M2 | HEIGHT: 73 IN | DIASTOLIC BLOOD PRESSURE: 87 MMHG | OXYGEN SATURATION: 97 % | HEART RATE: 66 BPM

## 2022-10-06 DIAGNOSIS — E03.9 HYPOTHYROIDISM, UNSPECIFIED TYPE: ICD-10-CM

## 2022-10-06 DIAGNOSIS — E11.8 TYPE 2 DIABETES MELLITUS WITH COMPLICATION (HCC): Primary | ICD-10-CM

## 2022-10-06 DIAGNOSIS — N62 GYNECOMASTIA: ICD-10-CM

## 2022-10-06 PROCEDURE — 82962 GLUCOSE BLOOD TEST: CPT | Performed by: INTERNAL MEDICINE

## 2022-10-06 PROCEDURE — 99213 OFFICE O/P EST LOW 20 MIN: CPT | Performed by: INTERNAL MEDICINE

## 2022-10-06 PROCEDURE — 3051F HG A1C>EQUAL 7.0%<8.0%: CPT | Performed by: INTERNAL MEDICINE

## 2022-10-06 RX ORDER — TAMOXIFEN CITRATE 10 MG/1
TABLET ORAL
Qty: 180 TABLET | Refills: 3 | Status: SHIPPED | OUTPATIENT
Start: 2022-10-06

## 2022-10-06 RX ORDER — LANCETS 28 GAUGE
EACH MISCELLANEOUS
Qty: 100 EACH | Refills: 3 | Status: SHIPPED | OUTPATIENT
Start: 2022-10-06

## 2022-10-06 RX ORDER — BLOOD SUGAR DIAGNOSTIC
STRIP MISCELLANEOUS
Qty: 100 STRIP | Refills: 3 | Status: SHIPPED | OUTPATIENT
Start: 2022-10-06

## 2022-10-06 NOTE — PROGRESS NOTES
10/6/2022    Assessment:       Diagnosis Orders   1. Type 2 diabetes mellitus with complication (HCC)  POCT Glucose      2. Hypothyroidism, unspecified type        3. Gynecomastia              PLAN:     Orders Placed This Encounter   Procedures    Basic Metabolic Panel     Standing Status:   Future     Standing Expiration Date:   10/6/2023    Hemoglobin A1C     Standing Status:   Future     Standing Expiration Date:   10/6/2023    TSH with Reflex     Standing Status:   Future     Standing Expiration Date:   10/6/2023    T4, Free     Standing Status:   Future     Standing Expiration Date:   10/6/2023    POCT Glucose     Orders Placed This Encounter   Medications    tamoxifen (NOLVADEX) 10 MG tablet     Sig: TAKE 1 TABLET BY MOUTH 2 TIMES A DAY     Dispense:  180 tablet     Refill:  3    Prodigy Twist Top Lancets 28G MISC     Sig: TEST ONCE DAILY     Dispense:  100 each     Refill:  3    blood glucose test strips (PRODIGY NO CODING BLOOD GLUC) strip     Sig: As needed. Dispense:  100 strip     Refill:  3     Continue current medication regimen for diabetes hypothyroidism  Continue tamoxifen repeat labs in 3 to 6 months      Orders Placed This Encounter   Procedures    POCT Glucose     No orders of the defined types were placed in this encounter. No follow-ups on file.   Subjective:     Chief Complaint   Patient presents with    Diabetes    Hypothyroidism     Vitals:    10/06/22 1532 10/06/22 1537   BP: (!) 155/90 (!) 142/87   Site: Left Upper Arm Right Upper Arm   Position: Sitting Sitting   Cuff Size: Large Adult Large Adult   Pulse: 66    SpO2: 97%    Weight: 252 lb (114.3 kg)    Height: 6' 1\" (1.854 m)      Wt Readings from Last 3 Encounters:   10/06/22 252 lb (114.3 kg)   09/07/22 255 lb (115.7 kg)   08/18/22 255 lb (115.7 kg)     BP Readings from Last 3 Encounters:   10/06/22 (!) 142/87   09/07/22 124/84   08/18/22 122/82     Follow-up on type 2 diabetes hypothyroidism history of gynecomastia on tamoxifen labs were reviewed requesting new meters  For diabetes patient is on Janumet twice a day 50/500 t twice daily ethan Synthroid 125 mcg for hypothyroidism  A1c 7.7  Thyroid function test stable    Diabetes  He presents for his follow-up diabetic visit. He has type 2 diabetes mellitus. Associated symptoms include fatigue. Pertinent negatives for diabetes include no weight loss. There are no hypoglycemic complications. Symptoms are worsening. Risk factors for coronary artery disease include obesity. His overall blood glucose range is 180-200 mg/dl. Thyroid Problem  Presents for follow-up visit. Symptoms include fatigue. Patient reports no weight loss. The symptoms have been stable. Past Medical History:   Diagnosis Date    Gynecomastia     History of hypokalemia     Hyperaldosteronism, unspecified ([facility] [address]     Hypertension     Hypogonadism male     Hypokalemia     Hypothyroidism     Osteoarthritis     Type II or unspecified type diabetes mellitus without mention of complication, not stated as uncontrolled      Past Surgical History:   Procedure Laterality Date    COLONOSCOPY N/A 12/17/2021    COLORECTAL CANCER SCREENING, HIGH RISK performed by Jina Cortes MD at [address] [place] [address]      left quadracep tendon rupture repair w/ anchors    MANDIBLE FRACTURE SURGERY      TONSILLECTOMY       Social History     Socioeconomic History    Marital status: Single     Spouse name: Not on file    Number of children: Not on file    Years of education: Not on file    Highest education level: Not on file   Occupational History    Occupation: resp.  therapist   Tobacco Use    Smoking status: Never    Smokeless tobacco: Never   Vaping Use    Vaping Use: Never used   Substance and Sexual Activity    Alcohol use: No     Alcohol/week: 0.0 standard drinks    Drug use: No    Sexual activity: Not on file     Comment: single   Other Topics Concern    Not on file   Social History Narrative    Not on file     Social Determinants of Health     Financial Resource Strain: Low Risk     Difficulty of Paying Living Expenses: Not hard at all   Food Insecurity: No Food Insecurity    Worried About Running Out of Food in the Last Year: Never true    Ran Out of Food in the Last Year: Never true   Transportation Needs: Not on file   Physical Activity: Not on file   Stress: Not on file   Social Connections: Not on file   Intimate Partner Violence: Not on file   Housing Stability: Not on file     Family History   Problem Relation Age of Onset    Heart Disease Mother     Arthritis Mother     Dementia Mother     Depression Mother     Diabetes Father     Arthritis Father     Heart Disease Father     Colon Cancer Sister      Allergies   Allergen Reactions    Morphine     Invokana [Canagliflozin]      UTI and yeast infection     Shellfish-Derived Products Swelling       Current Outpatient Medications:     Prodigy Twist Top Lancets 28G MISC, TEST ONCE DAILY, Disp: 100 each, Rfl: 3    PRODIGY NO CODING BLOOD GLUC strip, TEST ONCE DAILY, Disp: 100 strip, Rfl: 3    mometasone (ELOCON) 0.1 % cream, Apply topically twice daily, Disp: 50 g, Rfl: 0    tamoxifen (NOLVADEX) 10 MG tablet, TAKE 1 TABLET BY MOUTH 2 TIMES A DAY, Disp: 180 tablet, Rfl: 3    potassium chloride (KLOR-CON) 10 MEQ extended release tablet, Take 3 tablets by mouth daily, Disp: 270 tablet, Rfl: 3    atenolol-chlorthalidone (TENORETIC) 100-25 MG per tablet, TAKE 1 TABLET BY MOUTH ONE TIME A DAY, Disp: 90 tablet, Rfl: 3    amLODIPine (NORVASC) 5 MG tablet, Take 1 tablet by mouth daily, Disp: 90 tablet, Rfl: 3    Blood Glucose Monitoring Suppl (AGAMATRIX PRESTO) w/Device KIT, Please give 1 meter dx e11.65, Disp: 1 kit, Rfl: 0    Blood Glucose Monitoring Suppl (PRODIGY AUTOCODE BLOOD GLUCOSE) CATHRYN, Give 1 meter, Disp: 1 each, Rfl: 0    blood glucose test strips (FREESTYLE INSULINX TEST) strip, Test once daily, Disp: 100 strip, Rfl: 3    eplerenone (INSPRA) 50 MG tablet, Take one tablet po bid, Disp: 180 tablet, Rfl: 3    sitaGLIPtan-metFORMIN (JANUMET)  MG per tablet, TAKE 1 TABLET BY MOUTH 2 TIMES A DAY WITH MEALS, Disp: 180 tablet, Rfl: 1    Lancet Devices (PRODIGY LANCING DEVICE) MISC, Give 1 device, Disp: 1 each, Rfl: 0    levothyroxine (SYNTHROID) 125 MCG tablet, TAKE 1 TABLET BY MOUTH ONE TIME A DAY, Disp: 90 tablet, Rfl: 3    Prodigy Lancets 28G MISC, Test once daily, Disp: 100 each, Rfl: 3    Safety Lancets MISC, Safety pro lancets, use one daily, Disp: 100 each, Rfl: 3    telmisartan (MICARDIS) 80 MG tablet, TAKE 1 TABLET BY MOUTH ONE TIME A DAY, Disp: 90 tablet, Rfl: 3    zoster recombinant adjuvanted vaccine (SHINGRIX) 50 MCG/0.5ML SUSR injection, Inject 0.5 mLs into the muscle See Admin Instructions 1 dose now and repeat in 2-6 months, Disp: 0.5 mL, Rfl: 0    Multiple Vitamin (MULTIVITAMIN ADULT PO), Take by mouth daily, Disp: , Rfl:     meloxicam (MOBIC) 15 MG tablet, Take 1 tablet by mouth daily, Disp: 30 tablet, Rfl: 3    colchicine (COLCRYS) 0.6 MG tablet, Take 1 tablet by mouth 2 times daily for 1 day, Disp: 3 tablet, Rfl: 0  Lab Results   Component Value Date     10/03/2022    K 3.1 (L) 10/03/2022     10/03/2022    CO2 28 10/03/2022    BUN 14 10/03/2022    CREATININE 0.85 10/03/2022    GLUCOSE 124 (H) 10/03/2022    CALCIUM 9.1 10/03/2022    PROT 7.0 10/03/2022    LABALBU 4.2 10/03/2022    BILITOT 0.3 10/03/2022    ALKPHOS 98 10/03/2022    AST 47 (H) 10/03/2022    ALT 54 (H) 10/03/2022    LABGLOM >60.0 10/03/2022    GFRAA >60.0 10/03/2022    GLOB 2.8 10/03/2022     Lab Results   Component Value Date    WBC 6.0 08/18/2022    HGB 14.7 08/18/2022    HCT 44.7 08/18/2022    MCV 80.0 08/18/2022     08/18/2022     Lab Results   Component Value Date    LABA1C 7.7 (H) 10/03/2022    LABA1C 6.7 03/10/2022    LABA1C 6.9 (H) 12/02/2021     Lab Results   Component Value Date    HDL 34 (L) 12/02/2021    HDL 31 (L) 09/01/2021    HDL 32 (L) 05/13/2019    LDLCALC 60 12/02/2021 LDLCALC 60 09/01/2021    LDLCALC 51 05/13/2019    CHOL 114 12/02/2021    CHOL 118 09/01/2021    CHOL 108 05/13/2019    TRIG 99 12/02/2021    TRIG 137 09/01/2021    TRIG 124 05/13/2019     Lab Results   Component Value Date    TESTM 1164 (H) 09/08/2020    TESTM 875 02/20/2020    TESTM 891 (H) 08/26/2019     Lab Results   Component Value Date    TSH 1.440 09/08/2020    TSH 1.370 02/20/2020    TSH 2.170 08/26/2019    TSHREFLEX 2.010 10/03/2022    TSHREFLEX 1.210 12/02/2021    TSHREFLEX 1.350 08/12/2021    T4FREE 1.43 10/03/2022    T4FREE 1.46 12/02/2021    T4FREE 1.32 08/12/2021     No results found for: TPOABS    Review of Systems   Constitutional:  Positive for fatigue. Negative for weight loss. Eyes: Negative. Cardiovascular: Negative. Endocrine: Negative. Hematological: Negative. All other systems reviewed and are negative. Objective:   Physical Exam  Vitals reviewed. Constitutional:       General: He is not in acute distress. Appearance: Normal appearance. He is obese. HENT:      Head: Normocephalic and atraumatic. Right Ear: External ear normal.      Left Ear: External ear normal.      Nose: Nose normal.   Eyes:      General: No scleral icterus. Right eye: No discharge. Left eye: No discharge. Extraocular Movements: Extraocular movements intact. Conjunctiva/sclera: Conjunctivae normal.   Cardiovascular:      Rate and Rhythm: Normal rate. Pulmonary:      Effort: Pulmonary effort is normal.   Musculoskeletal:         General: Normal range of motion. Cervical back: Normal range of motion and neck supple. Neurological:      General: No focal deficit present. Mental Status: He is alert and oriented to person, place, and time.    Psychiatric:         Mood and Affect: Mood normal.         Behavior: Behavior normal.

## 2022-10-13 ASSESSMENT — ENCOUNTER SYMPTOMS: EYES NEGATIVE: 1

## 2022-12-13 ENCOUNTER — TELEPHONE (OUTPATIENT)
Dept: FAMILY MEDICINE CLINIC | Age: 62
End: 2022-12-13

## 2022-12-16 NOTE — TELEPHONE ENCOUNTER
----- Message from Ezra Felipe sent at 12/16/2022 11:04 AM EST -----  Subject: Medication Problem    Medication: eplerenone (INSPRA) 50 MG tablet  Dosage: Take one tablet po bid  Ordering Provider: Tiffany Perez    Question/Problem: Jordana Tobilauryn from [facility] called today   (12/16/2022) regarding a refill request on this medication, but wants to   know if the pcp has left the practice. Pharmacy: [facility], [address] [place] [address] [phone] - F [phone]    ---------------------------------------------------------------------------  --------------  [facility]  [phone]; OK to leave message on voicemail  ---------------------------------------------------------------------------  --------------    SCRIPT ANSWERS  Relationship to Patient: Third Party  Third Party Type: Pharmacy?    Representative Name: Jordana Bruce

## 2022-12-19 RX ORDER — EPLERENONE 50 MG/1
TABLET, FILM COATED ORAL
Qty: 180 TABLET | Refills: 0 | Status: SHIPPED | OUTPATIENT
Start: 2022-12-19

## 2022-12-30 DIAGNOSIS — E03.9 HYPOTHYROIDISM, UNSPECIFIED TYPE: ICD-10-CM

## 2022-12-30 DIAGNOSIS — E11.8 TYPE 2 DIABETES MELLITUS WITH COMPLICATION (HCC): ICD-10-CM

## 2022-12-30 LAB
ANION GAP SERPL CALCULATED.3IONS-SCNC: 12 MEQ/L (ref 9–15)
BUN BLDV-MCNC: 10 MG/DL (ref 8–23)
CALCIUM SERPL-MCNC: 9.6 MG/DL (ref 8.5–9.9)
CHLORIDE BLD-SCNC: 101 MEQ/L (ref 95–107)
CO2: 31 MEQ/L (ref 20–31)
CREAT SERPL-MCNC: 0.93 MG/DL (ref 0.7–1.2)
GFR SERPL CREATININE-BSD FRML MDRD: >60 ML/MIN/{1.73_M2}
GLUCOSE BLD-MCNC: 132 MG/DL (ref 70–99)
HBA1C MFR BLD: 7.8 % (ref 4.8–5.9)
POTASSIUM SERPL-SCNC: 3.4 MEQ/L (ref 3.4–4.9)
SODIUM BLD-SCNC: 144 MEQ/L (ref 135–144)
T4 FREE: 1.49 NG/DL (ref 0.84–1.68)
TSH REFLEX: 1.71 UIU/ML (ref 0.44–3.86)

## 2023-01-06 ENCOUNTER — OFFICE VISIT (OUTPATIENT)
Dept: ENDOCRINOLOGY | Age: 63
End: 2023-01-06

## 2023-01-06 VITALS
SYSTOLIC BLOOD PRESSURE: 164 MMHG | HEART RATE: 70 BPM | BODY MASS INDEX: 33.4 KG/M2 | WEIGHT: 252 LBS | DIASTOLIC BLOOD PRESSURE: 84 MMHG | HEIGHT: 73 IN | OXYGEN SATURATION: 98 %

## 2023-01-06 DIAGNOSIS — E03.9 HYPOTHYROIDISM, UNSPECIFIED TYPE: ICD-10-CM

## 2023-01-06 DIAGNOSIS — E11.8 TYPE 2 DIABETES MELLITUS WITH COMPLICATION (HCC): Primary | ICD-10-CM

## 2023-01-06 LAB
CHP ED QC CHECK: NORMAL
GLUCOSE BLD-MCNC: 145 MG/DL

## 2023-01-06 RX ORDER — ORAL SEMAGLUTIDE 3 MG/1
TABLET ORAL
Qty: 30 TABLET | Refills: 0 | COMMUNITY
Start: 2023-01-06

## 2023-01-06 RX ORDER — ORAL SEMAGLUTIDE 3 MG/1
TABLET ORAL
Qty: 90 TABLET | Refills: 5 | Status: SHIPPED | OUTPATIENT
Start: 2023-01-06

## 2023-01-06 NOTE — PROGRESS NOTES
1/6/2023    Assessment:       Diagnosis Orders   1. Type 2 diabetes mellitus with complication (HCC)  POCT Glucose      2. Hypothyroidism, unspecified type              PLAN:     Add Rybelsus to Janumet  Discussed Trulicity/Ozempic  Samples given for Rybelsus  Continue current dose of Synthroid  Repeat labs in 3 months  A1c goal of 7 or lower  Orders Placed This Encounter   Procedures    Basic Metabolic Panel     Standing Status:   Future     Standing Expiration Date:   1/6/2024    T4, Free     Standing Status:   Future     Standing Expiration Date:   1/6/2024    TSH     Standing Status:   Future     Standing Expiration Date:   1/6/2024    Hemoglobin A1C     Standing Status:   Future     Standing Expiration Date:   1/6/2024    POCT Glucose     Orders Placed This Encounter   Medications    Semaglutide (RYBELSUS) 3 MG TABS     Sig: Once a day     Dispense:  90 tablet     Refill:  5    Semaglutide (RYBELSUS) 3 MG TABS     Sig: Lot #: [id number], Disp: 30. Exp: 3/24     Dispense:  30 tablet     Refill:  0           Orders Placed This Encounter   Procedures    POCT Glucose     No orders of the defined types were placed in this encounter. No follow-ups on file.   Subjective:     Chief Complaint   Patient presents with    Diabetes    Thyroid Problem     Vitals:    01/06/23 0902   BP: (!) 164/84   Site: Left Upper Arm   Position: Sitting   Cuff Size: Large Adult   Pulse: 70   SpO2: 98%   Weight: 252 lb (114.3 kg)   Height: 6' 1\" (1.854 m)     Wt Readings from Last 3 Encounters:   01/06/23 252 lb (114.3 kg)   10/06/22 252 lb (114.3 kg)   09/07/22 255 lb (115.7 kg)     BP Readings from Last 3 Encounters:   01/06/23 (!) 164/84   10/06/22 (!) 142/87   09/07/22 124/84     Follow-up on type 2 diabetes hypothyroidism obesity history of gynecomastia patient's A1c has gone up to 7.8 currently on Janumet twice a day  Testing occasionally average blood sugars close to 170/180  Hypothyroidism on replacement thyroid Synthroid 125 mcg Patient's visit was conducted through video telecommunication. Patient consented before the start of visit as to understanding of privacy concerns, possible technological failure, and their responsibility of carrying out instructions of plan.

I have reviewed the history, physical exam, assessment and management plans.  I concur with or have edited all elements of her note. daily thyroid function test have been stable    Diabetes  He presents for his follow-up diabetic visit. He has type 2 diabetes mellitus. Pertinent negatives for diabetes include no polyuria and no weight loss. Symptoms are worsening. Risk factors for coronary artery disease include obesity. Current diabetic treatment includes oral agent (dual therapy). His overall blood glucose range is 180-200 mg/dl. Thyroid Problem  Presents for follow-up visit. Patient reports no weight loss. The symptoms have been stable. Past Medical History:   Diagnosis Date    Gynecomastia     History of hypokalemia     Hyperaldosteronism, unspecified ([address]     Hypertension     Hypogonadism male     Hypokalemia     Hypothyroidism     Osteoarthritis     Type II or unspecified type diabetes mellitus without mention of complication, not stated as uncontrolled      Past Surgical History:   Procedure Laterality Date    COLONOSCOPY N/A 12/17/2021    COLORECTAL CANCER SCREENING, HIGH RISK performed by Mao Felipe MD at [address] [place] [address]      left quadracep tendon rupture repair w/ anchors    MANDIBLE FRACTURE SURGERY      TONSILLECTOMY       Social History     Socioeconomic History    Marital status: Single     Spouse name: Not on file    Number of children: Not on file    Years of education: Not on file    Highest education level: Not on file   Occupational History    Occupation: resp.  therapist   Tobacco Use    Smoking status: Never    Smokeless tobacco: Never   Vaping Use    Vaping Use: Never used   Substance and Sexual Activity    Alcohol use: No     Alcohol/week: 0.0 standard drinks    Drug use: No    Sexual activity: Not on file     Comment: single   Other Topics Concern    Not on file   Social History Narrative    Not on file     Social Determinants of Health     Financial Resource Strain: Low Risk     Difficulty of Paying Living Expenses: Not hard at all   Food Insecurity: No Food Insecurity    Worried About Running Out of Food in the Last Year: Never true    Ran Out of Food in the Last Year: Never true   Transportation Needs: Not on file   Physical Activity: Not on file   Stress: Not on file   Social Connections: Not on file   Intimate Partner Violence: Not on file   Housing Stability: Not on file     Family History   Problem Relation Age of Onset    Heart Disease Mother     Arthritis Mother     Dementia Mother     Depression Mother     Diabetes Father     Arthritis Father     Heart Disease Father     Colon Cancer Sister      Allergies   Allergen Reactions    Morphine     Invokana [Canagliflozin]      UTI and yeast infection     Shellfish-Derived Products Swelling       Current Outpatient Medications:     eplerenone (INSPRA) 50 MG tablet, Take one tablet po bid, Disp: 180 tablet, Rfl: 0    tamoxifen (NOLVADEX) 10 MG tablet, TAKE 1 TABLET BY MOUTH 2 TIMES A DAY, Disp: 180 tablet, Rfl: 3    Prodigy Twist Top Lancets 28G MISC, TEST ONCE DAILY, Disp: 100 each, Rfl: 3    blood glucose test strips (PRODIGY NO CODING BLOOD GLUC) strip, As needed. , Disp: 100 strip, Rfl: 3    mometasone (ELOCON) 0.1 % cream, Apply topically twice daily, Disp: 50 g, Rfl: 0    potassium chloride (KLOR-CON) 10 MEQ extended release tablet, Take 3 tablets by mouth daily, Disp: 270 tablet, Rfl: 3    atenolol-chlorthalidone (TENORETIC) 100-25 MG per tablet, TAKE 1 TABLET BY MOUTH ONE TIME A DAY, Disp: 90 tablet, Rfl: 3    amLODIPine (NORVASC) 5 MG tablet, Take 1 tablet by mouth daily, Disp: 90 tablet, Rfl: 3    Blood Glucose Monitoring Suppl (AGAMATRIX PRESTO) w/Device KIT, Please give 1 meter dx e11.65, Disp: 1 kit, Rfl: 0    Blood Glucose Monitoring Suppl (PRODIGY AUTOCODE BLOOD GLUCOSE) CATHRYN, Give 1 meter, Disp: 1 each, Rfl: 0    blood glucose test strips (FREESTYLE INSULINX TEST) strip, Test once daily, Disp: 100 strip, Rfl: 3    sitaGLIPtan-metFORMIN (JANUMET)  MG per tablet, TAKE 1 TABLET BY MOUTH 2 TIMES A DAY WITH MEALS, Disp: 180 tablet, Rfl: 1    Lancet Devices (PRODIGY LANCING DEVICE) MISC, Give 1 device, Disp: 1 each, Rfl: 0    levothyroxine (SYNTHROID) 125 MCG tablet, TAKE 1 TABLET BY MOUTH ONE TIME A DAY, Disp: 90 tablet, Rfl: 3    Prodigy Lancets 28G MISC, Test once daily, Disp: 100 each, Rfl: 3    Safety Lancets MISC, Safety pro lancets, use one daily, Disp: 100 each, Rfl: 3    telmisartan (MICARDIS) 80 MG tablet, TAKE 1 TABLET BY MOUTH ONE TIME A DAY, Disp: 90 tablet, Rfl: 3    Multiple Vitamin (MULTIVITAMIN ADULT PO), Take by mouth daily, Disp: , Rfl:     meloxicam (MOBIC) 15 MG tablet, Take 1 tablet by mouth daily, Disp: 30 tablet, Rfl: 3    colchicine (COLCRYS) 0.6 MG tablet, Take 1 tablet by mouth 2 times daily for 1 day, Disp: 3 tablet, Rfl: 0  Lab Results   Component Value Date     12/30/2022    K 3.4 12/30/2022     12/30/2022    CO2 31 12/30/2022    BUN 10 12/30/2022    CREATININE 0.93 12/30/2022    GLUCOSE 145 01/06/2023    CALCIUM 9.6 12/30/2022    PROT 7.0 10/03/2022    LABALBU 4.2 10/03/2022    BILITOT 0.3 10/03/2022    ALKPHOS 98 10/03/2022    AST 47 (H) 10/03/2022    ALT 54 (H) 10/03/2022    LABGLOM >60.0 12/30/2022    GFRAA >60.0 10/03/2022    GLOB 2.8 10/03/2022     Lab Results   Component Value Date    WBC 6.0 08/18/2022    HGB 14.7 08/18/2022    HCT 44.7 08/18/2022    MCV 80.0 08/18/2022     08/18/2022     Lab Results   Component Value Date    LABA1C 7.8 (H) 12/30/2022    LABA1C 7.7 (H) 10/03/2022    LABA1C 6.7 03/10/2022     Lab Results   Component Value Date    HDL 34 (L) 12/02/2021    HDL 31 (L) 09/01/2021    HDL 32 (L) 05/13/2019    LDLCALC 60 12/02/2021    LDLCALC 60 09/01/2021    LDLCALC 51 05/13/2019    CHOL 114 12/02/2021    CHOL 118 09/01/2021    CHOL 108 05/13/2019    TRIG 99 12/02/2021    TRIG 137 09/01/2021    TRIG 124 05/13/2019     Lab Results   Component Value Date    TESTM 1164 (H) 09/08/2020    TESTM 875 02/20/2020    TESTM 891 (H) 08/26/2019     Lab Results   Component Value Date TSH 1.440 09/08/2020    TSH 1.370 02/20/2020    TSH 2.170 08/26/2019    TSHREFLEX 1.710 12/30/2022    TSHREFLEX 2.010 10/03/2022    TSHREFLEX 1.210 12/02/2021    T4FREE 1.49 12/30/2022    T4FREE 1.43 10/03/2022    T4FREE 1.46 12/02/2021     No results found for: TPOABS    Review of Systems   Constitutional:  Negative for weight loss. Cardiovascular: Negative. Endocrine: Negative for polyuria. All other systems reviewed and are negative. Objective:   Physical Exam  Vitals reviewed. Constitutional:       General: He is not in acute distress. Appearance: Normal appearance. He is obese. HENT:      Head: Normocephalic and atraumatic. Right Ear: External ear normal.      Left Ear: External ear normal.      Nose: Nose normal.   Eyes:      General: No scleral icterus. Right eye: No discharge. Left eye: No discharge. Extraocular Movements: Extraocular movements intact. Conjunctiva/sclera: Conjunctivae normal.   Cardiovascular:      Rate and Rhythm: Normal rate. Pulmonary:      Effort: Pulmonary effort is normal.   Musculoskeletal:         General: Normal range of motion. Cervical back: Normal range of motion and neck supple. Neurological:      General: No focal deficit present. Mental Status: He is alert and oriented to person, place, and time.    Psychiatric:         Mood and Affect: Mood normal.         Behavior: Behavior normal.

## 2023-01-09 ENCOUNTER — TELEPHONE (OUTPATIENT)
Dept: ENDOCRINOLOGY | Age: 63
End: 2023-01-09

## 2023-01-09 NOTE — TELEPHONE ENCOUNTER
Pt is on janumet and you had also prescribed rybelsus per pharmacy insurance will only cover one or the other if you want him to do both a PA for rybelsus will need to be done. Pharmacy also wants to know if he should be on the 3mg dose? They said the dose should be increased to 7mg.   They would like someone to call back with this info [phone]

## 2023-01-17 NOTE — TELEPHONE ENCOUNTER
PA is still under review ,left patient a message .  He can call his insurance for update information

## 2023-01-23 NOTE — TELEPHONE ENCOUNTER
Spoke to patient his PA is still under review , insurance sent him text saying it can take up to 3 weeks for answer.

## 2023-01-24 NOTE — TELEPHONE ENCOUNTER
There was a document since 1/10/22 at [facility] that had to be print, answer, sign and fax it back. I just fax to insurance and scan it to chart.

## 2023-01-24 NOTE — TELEPHONE ENCOUNTER
[facility] calling back.   Can someone please look into this PA it has still has not been approved and the pharmacy needs to know what to do about the medication

## 2023-02-19 NOTE — TELEPHONE ENCOUNTER
Rx requested:  Requested Prescriptions     Pending Prescriptions Disp Refills    eplerenone (INSPRA) 50 MG tablet [Pharmacy Med Name: EPLERENONE 50MG TABS] 180 tablet 0     Sig: TAKE 1 TABLET BY MOUTH 2 TIMES A DAY         Last Office Visit:   4/21/2022 with Moo Larios      Next Visit Date:  Future Appointments   Date Time Provider Jeremy Edgar   4/7/2023  9:30 AM Geovani Lloyd MD [facility]

## 2023-02-20 RX ORDER — EPLERENONE 50 MG/1
TABLET, FILM COATED ORAL
Qty: 180 TABLET | Refills: 0 | Status: SHIPPED | OUTPATIENT
Start: 2023-02-20

## 2023-02-24 RX ORDER — ORAL SEMAGLUTIDE 7 MG/1
TABLET ORAL
Qty: 90 TABLET | Refills: 3 | Status: SHIPPED | OUTPATIENT
Start: 2023-02-24

## 2023-02-24 NOTE — TELEPHONE ENCOUNTER
Pharmacy requesting medication refill.  Please approve or deny this request.    Rx requested:  Requested Prescriptions     Pending Prescriptions Disp Refills    Semaglutide (RYBELSUS) 7 MG TABS 90 tablet 3     Sig: Take one daily         Last Office Visit:   1/6/2023      Next Visit Date:  Future Appointments   Date Time Provider Jeremy Edgar   4/7/2023  9:30 AM Link MD [facility]

## 2023-03-28 DIAGNOSIS — I10 ESSENTIAL HYPERTENSION: ICD-10-CM

## 2023-03-28 RX ORDER — TELMISARTAN 80 MG/1
TABLET ORAL
Qty: 90 TABLET | Refills: 3 | OUTPATIENT
Start: 2023-03-28

## 2023-04-03 DIAGNOSIS — E11.65 UNCONTROLLED TYPE 2 DIABETES MELLITUS WITH HYPERGLYCEMIA (HCC): ICD-10-CM

## 2023-04-03 DIAGNOSIS — E11.8 TYPE 2 DIABETES MELLITUS WITH COMPLICATION (HCC): ICD-10-CM

## 2023-04-03 DIAGNOSIS — E03.9 HYPOTHYROIDISM, UNSPECIFIED TYPE: ICD-10-CM

## 2023-04-03 LAB
ANION GAP SERPL CALCULATED.3IONS-SCNC: 11 MEQ/L (ref 9–15)
BUN SERPL-MCNC: 16 MG/DL (ref 8–23)
CALCIUM SERPL-MCNC: 9.2 MG/DL (ref 8.5–9.9)
CHLORIDE SERPL-SCNC: 100 MEQ/L (ref 95–107)
CO2 SERPL-SCNC: 31 MEQ/L (ref 20–31)
CREAT SERPL-MCNC: 1.13 MG/DL (ref 0.7–1.2)
GLUCOSE SERPL-MCNC: 147 MG/DL (ref 70–99)
HBA1C MFR BLD: 8.3 % (ref 4.8–5.9)
POTASSIUM SERPL-SCNC: 3.4 MEQ/L (ref 3.4–4.9)
SODIUM SERPL-SCNC: 142 MEQ/L (ref 135–144)
T4 FREE SERPL-MCNC: 1.5 NG/DL (ref 0.84–1.68)
TSH SERPL-MCNC: 2.32 UIU/ML (ref 0.44–3.86)

## 2023-04-04 RX ORDER — SITAGLIPTIN AND METFORMIN HYDROCHLORIDE 500; 50 MG/1; MG/1
TABLET, FILM COATED ORAL
Qty: 180 TABLET | Refills: 1 | Status: SHIPPED | OUTPATIENT
Start: 2023-04-04 | End: 2023-04-07 | Stop reason: SDUPTHER

## 2023-06-19 RX ORDER — ATENOLOL AND CHLORTHALIDONE TABLET 100; 25 MG/1; MG/1
TABLET ORAL
Qty: 90 TABLET | Refills: 3 | OUTPATIENT
Start: 2023-06-19

## 2023-06-20 RX ORDER — ATENOLOL AND CHLORTHALIDONE TABLET 100; 25 MG/1; MG/1
TABLET ORAL
Qty: 90 TABLET | Refills: 3 | Status: SHIPPED | OUTPATIENT
Start: 2023-06-20

## 2023-06-28 DIAGNOSIS — E11.65 UNCONTROLLED TYPE 2 DIABETES MELLITUS WITH HYPERGLYCEMIA (HCC): ICD-10-CM

## 2023-06-28 LAB
ANION GAP SERPL CALCULATED.3IONS-SCNC: 13 MEQ/L (ref 9–15)
BUN SERPL-MCNC: 16 MG/DL (ref 8–23)
CALCIUM SERPL-MCNC: 9.3 MG/DL (ref 8.5–9.9)
CHLORIDE SERPL-SCNC: 99 MEQ/L (ref 95–107)
CO2 SERPL-SCNC: 27 MEQ/L (ref 20–31)
CREAT SERPL-MCNC: 0.98 MG/DL (ref 0.7–1.2)
GLUCOSE SERPL-MCNC: 227 MG/DL (ref 70–99)
HBA1C MFR BLD: 9.8 % (ref 4.8–5.9)
POTASSIUM SERPL-SCNC: 3.2 MEQ/L (ref 3.4–4.9)
SODIUM SERPL-SCNC: 139 MEQ/L (ref 135–144)
T4 FREE SERPL-MCNC: 1.46 NG/DL (ref 0.84–1.68)
TSH SERPL-MCNC: 2.21 UIU/ML (ref 0.44–3.86)

## 2023-07-07 ENCOUNTER — OFFICE VISIT (OUTPATIENT)
Dept: ENDOCRINOLOGY | Age: 63
End: 2023-07-07

## 2023-07-07 DIAGNOSIS — E11.65 UNCONTROLLED TYPE 2 DIABETES MELLITUS WITH HYPERGLYCEMIA (HCC): Primary | ICD-10-CM

## 2023-07-07 DIAGNOSIS — I10 ESSENTIAL HYPERTENSION: ICD-10-CM

## 2023-07-07 DIAGNOSIS — E03.9 HYPOTHYROIDISM, UNSPECIFIED TYPE: ICD-10-CM

## 2023-07-07 RX ORDER — INSULIN GLARGINE 100 [IU]/ML
INJECTION, SOLUTION SUBCUTANEOUS
Qty: 10 ADJUSTABLE DOSE PRE-FILLED PEN SYRINGE | Refills: 3 | Status: SHIPPED | OUTPATIENT
Start: 2023-07-07

## 2023-07-07 RX ORDER — SEMAGLUTIDE 0.68 MG/ML
INJECTION, SOLUTION SUBCUTANEOUS
Qty: 1 ML | Refills: 0 | COMMUNITY
Start: 2023-07-07

## 2023-07-07 RX ORDER — SEMAGLUTIDE 1.34 MG/ML
INJECTION, SOLUTION SUBCUTANEOUS
Qty: 3 ML | Refills: 3 | Status: SHIPPED | OUTPATIENT
Start: 2023-07-07

## 2023-07-07 RX ORDER — PEN NEEDLE, DIABETIC 32 GX 1/6"
1 NEEDLE, DISPOSABLE MISCELLANEOUS DAILY
Qty: 100 EACH | Refills: 3 | Status: SHIPPED | OUTPATIENT
Start: 2023-07-07

## 2023-07-07 NOTE — PROGRESS NOTES
2023    Assessment:       Diagnosis Orders   1. Uncontrolled type 2 diabetes mellitus with hyperglycemia (HCC)  POCT Glucose    Hemoglobin S8S    Basic Metabolic Panel    Lipid Panel    Microalbumin / Creatinine Urine Ratio      2. Hypothyroidism, unspecified type  T4, Free    TSH with Reflex      3.  Essential hypertension            PLAN:     Orders Placed This Encounter   Procedures    T4, Free     Standing Status:   Future     Standing Expiration Date:   2024    TSH with Reflex     Standing Status:   Future     Standing Expiration Date:   2024    Hemoglobin A1C     Standing Status:   Future     Standing Expiration Date:   746    Basic Metabolic Panel     Standing Status:   Future     Standing Expiration Date:   2024    Lipid Panel     Standing Status:   Future     Standing Expiration Date:   2024    Microalbumin / Creatinine Urine Ratio     Standing Status:   Future     Standing Expiration Date:   2024    POCT Glucose     Orders Placed This Encounter   Medications    Semaglutide,0.25 or 0.5MG/DOS, (OZEMPIC, 0.25 OR 0.5 MG/DOSE,) 2 MG/3ML SOPN     Sig: Lot [id number] 25     Dispense:  1 mL     Refill:  0    insulin glargine (LANTUS SOLOSTAR) 100 UNIT/ML injection pen     Si units in the evening daily     Dispense:  10 Adjustable Dose Pre-filled Pen Syringe     Refill:  3    Semaglutide,0.25 or 0.5MG/DOS, (OZEMPIC, 0.25 OR 0.5 MG/DOSE,) 2 MG/1.5ML SOPN     Si.5 mg once a week     Dispense:  3 mL     Refill:  3    Insulin Pen Needle (NOVOFINE PLUS PEN NEEDLE) 32G X 4 MM MISC     Si each by Does not apply route daily     Dispense:  100 each     Refill:  3       Continue Synthroid 125 mcg daily continue blood pressure medication regimen add  Ozempic and Lantus A1c goal of 7 lower more than 50% of 30 minutes spent in  Patient education counseling    Orders Placed This Encounter   Procedures    POCT Glucose     No orders of the defined types were placed in this

## 2023-07-09 ASSESSMENT — ENCOUNTER SYMPTOMS: EYES NEGATIVE: 1

## 2023-07-28 RX ORDER — ACYCLOVIR 400 MG/1
TABLET ORAL
Qty: 9 EACH | Refills: 3 | Status: SHIPPED | OUTPATIENT
Start: 2023-07-28

## 2023-07-28 RX ORDER — ACYCLOVIR 400 MG/1
TABLET ORAL
Qty: 1 EACH | Refills: 0 | Status: SHIPPED | OUTPATIENT
Start: 2023-07-28

## 2023-08-02 ENCOUNTER — TELEPHONE (OUTPATIENT)
Dept: ENDOCRINOLOGY | Age: 63
End: 2023-08-02

## 2023-08-18 ENCOUNTER — OFFICE VISIT (OUTPATIENT)
Dept: FAMILY MEDICINE CLINIC | Age: 63
End: 2023-08-18
Payer: COMMERCIAL

## 2023-08-18 VITALS
BODY MASS INDEX: 33.27 KG/M2 | HEART RATE: 59 BPM | OXYGEN SATURATION: 98 % | DIASTOLIC BLOOD PRESSURE: 80 MMHG | SYSTOLIC BLOOD PRESSURE: 136 MMHG | WEIGHT: 251 LBS | HEIGHT: 73 IN | TEMPERATURE: 98 F

## 2023-08-18 DIAGNOSIS — M76.62 LEFT ACHILLES TENDINITIS: Primary | ICD-10-CM

## 2023-08-18 PROBLEM — E11.9 TYPE 2 DIABETES MELLITUS (HCC): Status: ACTIVE | Noted: 2023-08-18

## 2023-08-18 PROCEDURE — 99213 OFFICE O/P EST LOW 20 MIN: CPT | Performed by: NURSE PRACTITIONER

## 2023-08-18 PROCEDURE — 3079F DIAST BP 80-89 MM HG: CPT | Performed by: NURSE PRACTITIONER

## 2023-08-18 PROCEDURE — 3075F SYST BP GE 130 - 139MM HG: CPT | Performed by: NURSE PRACTITIONER

## 2023-08-18 RX ORDER — MELOXICAM 15 MG/1
15 TABLET ORAL DAILY
Qty: 30 TABLET | Refills: 3 | Status: SHIPPED | OUTPATIENT
Start: 2023-08-18

## 2023-08-18 NOTE — PROGRESS NOTES
Subjective:      Patient ID: Josh Garner is a 58 y.o. male who presents today for:  Chief Complaint   Patient presents with    Pain     Left [place] tendon, tendinitis. He knows it's tendinitis, would like meloxicam       HPI  Patient is here with c/o left posterior heel pain. He has HX tendinitis. Says she has had this in the past.  Says it is painful with walking. Says he has some mild swelling. Says he has had Mobic in the past and would like another script. Past Medical History:   Diagnosis Date    Gynecomastia     History of hypokalemia     Hyperaldosteronism, unspecified ([address])     Hypertension     Hypogonadism male     Hypokalemia     Hypothyroidism     Osteoarthritis     Type II or unspecified type diabetes mellitus without mention of complication, not stated as uncontrolled      Past Surgical History:   Procedure Laterality Date    COLONOSCOPY N/A 12/17/2021    COLORECTAL CANCER SCREENING, HIGH RISK performed by Mojgan Angel MD at [address]      left quadracep tendon rupture repair w/ anchors    MANDIBLE FRACTURE SURGERY      TONSILLECTOMY       Social History     Socioeconomic History    Marital status: Single     Spouse name: Not on file    Number of children: Not on file    Years of education: Not on file    Highest education level: Not on file   Occupational History    Occupation: resp.  therapist   Tobacco Use    Smoking status: Never    Smokeless tobacco: Never   Vaping Use    Vaping Use: Never used   Substance and Sexual Activity    Alcohol use: No     Alcohol/week: 0.0 standard drinks    Drug use: No    Sexual activity: Not on file     Comment: single   Other Topics Concern    Not on file   Social History Narrative    Not on file     Social Determinants of Health     Financial Resource Strain: Low Risk     Difficulty of Paying Living Expenses: Not hard at all   Food Insecurity: No Food Insecurity    Worried About [place] in the Last Year: Never true

## 2023-08-21 RX ORDER — EPLERENONE 50 MG/1
TABLET, FILM COATED ORAL
Qty: 180 TABLET | Refills: 2 | Status: SHIPPED | OUTPATIENT
Start: 2023-08-21

## 2023-08-24 DIAGNOSIS — N62 GYNECOMASTIA: ICD-10-CM

## 2023-08-24 DIAGNOSIS — E03.9 HYPOTHYROIDISM, UNSPECIFIED TYPE: ICD-10-CM

## 2023-08-24 DIAGNOSIS — E11.8 TYPE 2 DIABETES MELLITUS WITH COMPLICATION (HCC): ICD-10-CM

## 2023-08-24 RX ORDER — LANCETS 28 GAUGE
EACH MISCELLANEOUS
Qty: 100 EACH | Refills: 3 | Status: SHIPPED | OUTPATIENT
Start: 2023-08-24

## 2023-08-24 RX ORDER — BLOOD SUGAR DIAGNOSTIC
STRIP MISCELLANEOUS
Qty: 100 STRIP | Refills: 3 | Status: SHIPPED | OUTPATIENT
Start: 2023-08-24

## 2023-09-11 DIAGNOSIS — I10 ESSENTIAL HYPERTENSION: ICD-10-CM

## 2023-09-11 RX ORDER — AMLODIPINE BESYLATE 5 MG/1
5 TABLET ORAL DAILY
Qty: 15 TABLET | Refills: 0 | Status: SHIPPED | OUTPATIENT
Start: 2023-09-11

## 2023-09-11 NOTE — TELEPHONE ENCOUNTER
Patient requesting medication refill.  Please approve or deny this request.    Rx requested:  Requested Prescriptions     Pending Prescriptions Disp Refills    amLODIPine (NORVASC) 5 MG tablet 15 tablet 0     Sig: Take 1 tablet by mouth daily         Last Office Visit:   7/7/2023      Next Visit Date:  Future Appointments   Date Time Provider [facility] [address]   10/13/2023  9:30 AM Julio Cesar Faustin MD [facility]

## 2023-10-09 DIAGNOSIS — E11.65 UNCONTROLLED TYPE 2 DIABETES MELLITUS WITH HYPERGLYCEMIA (HCC): ICD-10-CM

## 2023-10-09 DIAGNOSIS — E03.9 HYPOTHYROIDISM, UNSPECIFIED TYPE: ICD-10-CM

## 2023-10-09 LAB
ANION GAP SERPL CALCULATED.3IONS-SCNC: 10 MEQ/L (ref 9–15)
BUN SERPL-MCNC: 15 MG/DL (ref 8–23)
CALCIUM SERPL-MCNC: 9.6 MG/DL (ref 8.5–9.9)
CHLORIDE SERPL-SCNC: 101 MEQ/L (ref 95–107)
CHOLEST SERPL-MCNC: 109 MG/DL (ref 0–199)
CO2 SERPL-SCNC: 33 MEQ/L (ref 20–31)
CREAT SERPL-MCNC: 0.96 MG/DL (ref 0.7–1.2)
CREAT UR-MCNC: 105.4 MG/DL
GLUCOSE SERPL-MCNC: 128 MG/DL (ref 70–99)
HBA1C MFR BLD: 7.3 % (ref 4.8–5.9)
HDLC SERPL-MCNC: 34 MG/DL (ref 40–59)
LDLC SERPL CALC-MCNC: 57 MG/DL (ref 0–129)
MICROALBUMIN UR-MCNC: 16.8 MG/DL
MICROALBUMIN/CREAT UR-RTO: 159.4 MG/G (ref 0–30)
POTASSIUM SERPL-SCNC: 3.4 MEQ/L (ref 3.4–4.9)
SODIUM SERPL-SCNC: 144 MEQ/L (ref 135–144)
T4 FREE SERPL-MCNC: 1.4 NG/DL (ref 0.84–1.68)
TRIGL SERPL-MCNC: 88 MG/DL (ref 0–150)
TSH REFLEX: 2.46 UIU/ML (ref 0.44–3.86)

## 2023-10-13 ENCOUNTER — OFFICE VISIT (OUTPATIENT)
Dept: ENDOCRINOLOGY | Age: 63
End: 2023-10-13

## 2023-10-13 VITALS
SYSTOLIC BLOOD PRESSURE: 132 MMHG | BODY MASS INDEX: 32.47 KG/M2 | HEIGHT: 73 IN | OXYGEN SATURATION: 97 % | HEART RATE: 58 BPM | DIASTOLIC BLOOD PRESSURE: 74 MMHG | WEIGHT: 245 LBS

## 2023-10-13 DIAGNOSIS — E03.9 HYPOTHYROIDISM, UNSPECIFIED TYPE: ICD-10-CM

## 2023-10-13 DIAGNOSIS — E11.8 TYPE 2 DIABETES MELLITUS WITH COMPLICATION (HCC): Primary | ICD-10-CM

## 2023-10-13 DIAGNOSIS — I10 ESSENTIAL HYPERTENSION: ICD-10-CM

## 2023-10-13 DIAGNOSIS — E11.65 UNCONTROLLED TYPE 2 DIABETES MELLITUS WITH HYPERGLYCEMIA (HCC): ICD-10-CM

## 2023-10-13 LAB
CHP ED QC CHECK: NORMAL
GLUCOSE BLD-MCNC: 162 MG/DL

## 2023-10-13 RX ORDER — TELMISARTAN 80 MG/1
TABLET ORAL
Qty: 90 TABLET | Refills: 3 | Status: SHIPPED | OUTPATIENT
Start: 2023-10-13

## 2023-10-13 RX ORDER — AMLODIPINE BESYLATE 5 MG/1
5 TABLET ORAL DAILY
Qty: 15 TABLET | Refills: 0 | Status: SHIPPED | OUTPATIENT
Start: 2023-10-13

## 2023-10-13 RX ORDER — POTASSIUM CHLORIDE 750 MG/1
30 TABLET, FILM COATED, EXTENDED RELEASE ORAL DAILY
Qty: 270 TABLET | Refills: 3 | Status: SHIPPED | OUTPATIENT
Start: 2023-10-13

## 2023-10-13 RX ORDER — SITAGLIPTIN AND METFORMIN HYDROCHLORIDE 500; 50 MG/1; MG/1
TABLET, FILM COATED ORAL
Qty: 180 TABLET | Refills: 1 | Status: CANCELLED | OUTPATIENT
Start: 2023-10-13

## 2023-10-13 RX ORDER — LEVOTHYROXINE SODIUM 0.12 MG/1
TABLET ORAL
Qty: 90 TABLET | Refills: 3 | Status: SHIPPED | OUTPATIENT
Start: 2023-10-13

## 2023-10-13 RX ORDER — SEMAGLUTIDE 1.34 MG/ML
INJECTION, SOLUTION SUBCUTANEOUS
Qty: 3 ML | Refills: 3 | Status: CANCELLED | OUTPATIENT
Start: 2023-10-13

## 2023-10-13 RX ORDER — SEMAGLUTIDE 1.34 MG/ML
INJECTION, SOLUTION SUBCUTANEOUS
Qty: 4 ML | Refills: 3 | Status: SHIPPED | OUTPATIENT
Start: 2023-10-13

## 2023-10-13 RX ORDER — ACYCLOVIR 400 MG/1
TABLET ORAL
Qty: 9 EACH | Refills: 3 | Status: SHIPPED | OUTPATIENT
Start: 2023-10-13

## 2023-10-13 RX ORDER — SEMAGLUTIDE 0.68 MG/ML
INJECTION, SOLUTION SUBCUTANEOUS
Qty: 1 ML | Refills: 0 | COMMUNITY
Start: 2023-10-13

## 2023-10-13 RX ORDER — INSULIN GLARGINE 100 [IU]/ML
INJECTION, SOLUTION SUBCUTANEOUS
Qty: 10 ADJUSTABLE DOSE PRE-FILLED PEN SYRINGE | Refills: 3 | Status: SHIPPED | OUTPATIENT
Start: 2023-10-13

## 2023-10-13 NOTE — PROGRESS NOTES
0.5 MG/DOSE,) 2 MG/3ML SOPN     Sig: Lot #: [id number], Disp: 1, Exp: 01-03-25     Dispense:  1 mL     Refill:  0     Increase dose of Ozempic discussed diabetic nephropathy had newer medications to prevent worsening including Elsa Lev patient wants to hold off advised to lose weight A1c goal of 7 or lower more than 50% of 30 minutes spent in patient education counseling    Diabetes education provided today:    Diabetic nephropathy: Kidney function, microalbumin as a sign of diabetic nephropathy. Stages of kidney disease. Continuous Glucose monitor. How it works and checks blood sugars every 5 min. for 4 days during our tests. Managing high and low sugar readings. Orders Placed This Encounter   Procedures    POCT Glucose     No orders of the defined types were placed in this encounter. No follow-ups on file.   Subjective:     Chief Complaint   Patient presents with    Diabetes    Hypothyroidism    Hypertension     Vitals:    10/13/23 0934   BP: 132/74   Pulse: 58   SpO2: 97%   Weight: 245 lb (111.1 kg)   Height: 6' 1\" (1.854 m)     Wt Readings from Last 3 Encounters:   10/13/23 245 lb (111.1 kg)   08/18/23 251 lb (113.9 kg)   04/07/23 251 lb (113.9 kg)     BP Readings from Last 3 Encounters:   10/13/23 132/74   08/18/23 136/80   04/07/23 130/81     Follow-up with type 2 diabetes obesity history of diabetic nephropathy patient is on ARB because of microalbuminuria but stable creatinine levels have been normal currently on Ozempic 0.5 mg once a week plus Lantus 30 units at bedtime in addition to [address]  Patient also using Dexcom continuous glucose monitoring on the phone reviewed records average blood sugars 170 no severe hypoglycemia      Hypothyroidism on replacement with levothyroxine 125 thyroid function test have been stable    History of hypertension currently blood pressures have been stable patient on Micardis Norvasc tramadol hydrochlorothiazide and Inspra    Diabetes  He presents for his follow-up

## 2023-10-22 ENCOUNTER — NURSE TRIAGE (OUTPATIENT)
Dept: OTHER | Facility: CLINIC | Age: 63
End: 2023-10-22

## 2023-10-23 ENCOUNTER — OFFICE VISIT (OUTPATIENT)
Dept: FAMILY MEDICINE CLINIC | Age: 63
End: 2023-10-23
Payer: COMMERCIAL

## 2023-10-23 VITALS
TEMPERATURE: 98 F | HEIGHT: 73 IN | SYSTOLIC BLOOD PRESSURE: 128 MMHG | HEART RATE: 87 BPM | BODY MASS INDEX: 32.47 KG/M2 | WEIGHT: 245 LBS | OXYGEN SATURATION: 97 % | DIASTOLIC BLOOD PRESSURE: 64 MMHG

## 2023-10-23 DIAGNOSIS — M54.6 CHRONIC LEFT-SIDED THORACIC BACK PAIN: ICD-10-CM

## 2023-10-23 DIAGNOSIS — G89.29 CHRONIC LEFT-SIDED THORACIC BACK PAIN: ICD-10-CM

## 2023-10-23 DIAGNOSIS — S39.012A LOW BACK STRAIN, INITIAL ENCOUNTER: Primary | ICD-10-CM

## 2023-10-23 LAB
BILIRUBIN, POC: ABNORMAL
BLOOD URINE, POC: ABNORMAL
CLARITY, POC: ABNORMAL
COLOR, POC: ABNORMAL
GLUCOSE URINE, POC: ABNORMAL
KETONES, POC: ABNORMAL
LEUKOCYTE EST, POC: ABNORMAL
NITRITE, POC: ABNORMAL
PH, POC: 6
PROTEIN, POC: ABNORMAL
SPECIFIC GRAVITY, POC: 1.03
UROBILINOGEN, POC: ABNORMAL

## 2023-10-23 PROCEDURE — 81003 URINALYSIS AUTO W/O SCOPE: CPT | Performed by: NURSE PRACTITIONER

## 2023-10-23 PROCEDURE — 3078F DIAST BP <80 MM HG: CPT | Performed by: NURSE PRACTITIONER

## 2023-10-23 PROCEDURE — 99213 OFFICE O/P EST LOW 20 MIN: CPT | Performed by: NURSE PRACTITIONER

## 2023-10-23 PROCEDURE — 3074F SYST BP LT 130 MM HG: CPT | Performed by: NURSE PRACTITIONER

## 2023-10-23 RX ORDER — KETOROLAC TROMETHAMINE 10 MG/1
10 TABLET, FILM COATED ORAL EVERY 6 HOURS PRN
Qty: 40 TABLET | Refills: 0 | Status: SHIPPED | OUTPATIENT
Start: 2023-10-23 | End: 2023-11-02

## 2023-10-23 RX ORDER — ORPHENADRINE CITRATE 100 MG/1
100 TABLET, EXTENDED RELEASE ORAL 2 TIMES DAILY
Qty: 20 TABLET | Refills: 0 | Status: SHIPPED | OUTPATIENT
Start: 2023-10-23 | End: 2023-11-02

## 2023-10-23 SDOH — ECONOMIC STABILITY: HOUSING INSECURITY
IN THE LAST 12 MONTHS, WAS THERE A TIME WHEN YOU DID NOT HAVE A STEADY PLACE TO SLEEP OR SLEPT IN A SHELTER (INCLUDING NOW)?: NO

## 2023-10-23 SDOH — ECONOMIC STABILITY: INCOME INSECURITY: HOW HARD IS IT FOR YOU TO PAY FOR THE VERY BASICS LIKE FOOD, HOUSING, MEDICAL CARE, AND HEATING?: NOT HARD AT ALL

## 2023-10-23 SDOH — ECONOMIC STABILITY: FOOD INSECURITY: WITHIN THE PAST 12 MONTHS, THE FOOD YOU BOUGHT JUST DIDN'T LAST AND YOU DIDN'T HAVE MONEY TO GET MORE.: NEVER TRUE

## 2023-10-23 SDOH — ECONOMIC STABILITY: FOOD INSECURITY: WITHIN THE PAST 12 MONTHS, YOU WORRIED THAT YOUR FOOD WOULD RUN OUT BEFORE YOU GOT MONEY TO BUY MORE.: NEVER TRUE

## 2023-10-23 ASSESSMENT — PATIENT HEALTH QUESTIONNAIRE - PHQ9
SUM OF ALL RESPONSES TO PHQ QUESTIONS 1-9: 0
SUM OF ALL RESPONSES TO PHQ9 QUESTIONS 1 & 2: 0
SUM OF ALL RESPONSES TO PHQ QUESTIONS 1-9: 0
1. LITTLE INTEREST OR PLEASURE IN DOING THINGS: 0
2. FEELING DOWN, DEPRESSED OR HOPELESS: 0

## 2023-10-23 NOTE — PROGRESS NOTES
Blood Gluc Sensor (DEXCOM G7 SENSOR) MISC Change every 10 days 9 each 3    Semaglutide, 1 MG/DOSE, (OZEMPIC, 1 MG/DOSE,) 4 MG/3ML SOPN 1 mg once a week 4 mL 3    insulin glargine (LANTUS SOLOSTAR) 100 UNIT/ML injection pen 30 units in the evening daily 10 Adjustable Dose Pre-filled Pen Syringe 3    Semaglutide,0.25 or 0.5MG/DOS, (OZEMPIC, 0.25 OR 0.5 MG/DOSE,) 2 MG/3ML SOPN Lot #: [id number], Disp: 1, Exp: 01-03-25 1 mL 0    Prodigy Twist Top Lancets 28G MISC TEST ONCE DAILY 100 each 3    blood glucose test strips (PRODIGY NO CODING BLOOD GLUC) strip TEST ONCE DAILY 100 strip 3    eplerenone (INSPRA) 50 MG tablet TAKE ONE TABLET BY MOUTH TWICE A  tablet 2    meloxicam (MOBIC) 15 MG tablet Take 1 tablet by mouth daily 30 tablet 3    Continuous Blood Gluc  (DEXCOM G7 ) CATHRYN Give 1  1 each 0    Continuous Blood Gluc  (FREESTYLE STORMY 2 READER) CATHRYN Please give 1 reader 1 each 0    Continuous Blood Gluc Sensor (FREESTYLE STORMY 2 SENSOR) MISC Change every 14 days 6 each 3    Semaglutide,0.25 or 0.5MG/DOS, (OZEMPIC, 0.25 OR 0.5 MG/DOSE,) 2 MG/1.5ML SOPN 0.5 mg once a week 3 mL 3    Insulin Pen Needle (NOVOFINE PLUS PEN NEEDLE) 32G X 4 MM MISC 1 each by Does not apply route daily 100 each 3    atenolol-chlorthalidone (TENORETIC) 100-25 MG per tablet TAKE 1 TABLET BY MOUTH ONE TIME A DAY 90 tablet 3    tamoxifen (NOLVADEX) 10 MG tablet TAKE 1 TABLET BY MOUTH 2 TIMES A  tablet 3    sitaGLIPtan-metFORMIN (JANUMET)  MG per tablet TAKE 1 TABLET BY MOUTH 2 TIMES A DAY WITH MEALS 180 tablet 1    mometasone (ELOCON) 0.1 % cream Apply topically twice daily 50 g 0    Blood Glucose Monitoring Suppl (AGAMATRIX PRESTO) w/Device KIT Please give 1 meter dx e11.65 1 kit 0    Blood Glucose Monitoring Suppl (PRODIGY AUTOCODE BLOOD GLUCOSE) CATHRYN Give 1 meter 1 each 0    blood glucose test strips (FREESTYLE INSULINX TEST) strip Test once daily 100 strip 3    Lancet Devices ([employer] LANCING

## 2023-10-24 ASSESSMENT — ENCOUNTER SYMPTOMS
VOMITING: 0
NAUSEA: 0
SHORTNESS OF BREATH: 0
DIARRHEA: 0
COUGH: 0
ABDOMINAL PAIN: 0
BACK PAIN: 1

## 2024-01-08 ENCOUNTER — TELEPHONE (OUTPATIENT)
Dept: ENDOCRINOLOGY | Age: 64
End: 2024-01-08

## 2024-01-08 DIAGNOSIS — E11.8 TYPE 2 DIABETES MELLITUS WITH COMPLICATION (HCC): ICD-10-CM

## 2024-01-08 DIAGNOSIS — E03.9 HYPOTHYROIDISM, UNSPECIFIED TYPE: ICD-10-CM

## 2024-01-08 LAB
ANION GAP SERPL CALCULATED.3IONS-SCNC: 13 MEQ/L (ref 9–15)
BUN SERPL-MCNC: 20 MG/DL (ref 8–23)
CALCIUM SERPL-MCNC: 9.6 MG/DL (ref 8.5–9.9)
CHLORIDE SERPL-SCNC: 106 MEQ/L (ref 95–107)
CO2 SERPL-SCNC: 27 MEQ/L (ref 20–31)
CREAT SERPL-MCNC: 1 MG/DL (ref 0.7–1.2)
GLUCOSE SERPL-MCNC: 108 MG/DL (ref 70–99)
HBA1C MFR BLD: 7.1 % (ref 4.8–5.9)
POTASSIUM SERPL-SCNC: 3 MEQ/L (ref 3.4–4.9)
SODIUM SERPL-SCNC: 146 MEQ/L (ref 135–144)
T4 FREE SERPL-MCNC: 1.18 NG/DL (ref 0.84–1.68)
TSH REFLEX: 1.55 UIU/ML (ref 0.44–3.86)

## 2024-01-12 ENCOUNTER — OFFICE VISIT (OUTPATIENT)
Dept: ENDOCRINOLOGY | Age: 64
End: 2024-01-12
Payer: COMMERCIAL

## 2024-01-12 VITALS
HEIGHT: 72 IN | HEART RATE: 63 BPM | DIASTOLIC BLOOD PRESSURE: 87 MMHG | SYSTOLIC BLOOD PRESSURE: 144 MMHG | BODY MASS INDEX: 34.13 KG/M2 | OXYGEN SATURATION: 96 % | WEIGHT: 252 LBS

## 2024-01-12 DIAGNOSIS — E03.9 HYPOTHYROIDISM, UNSPECIFIED TYPE: ICD-10-CM

## 2024-01-12 DIAGNOSIS — N62 GYNECOMASTIA: ICD-10-CM

## 2024-01-12 DIAGNOSIS — E11.8 TYPE 2 DIABETES MELLITUS WITH COMPLICATION (HCC): Primary | ICD-10-CM

## 2024-01-12 DIAGNOSIS — I10 ESSENTIAL HYPERTENSION: ICD-10-CM

## 2024-01-12 DIAGNOSIS — E87.6 HYPOKALEMIA: ICD-10-CM

## 2024-01-12 LAB
CHP ED QC CHECK: NORMAL
GLUCOSE BLD-MCNC: 142 MG/DL

## 2024-01-12 PROCEDURE — 3079F DIAST BP 80-89 MM HG: CPT | Performed by: INTERNAL MEDICINE

## 2024-01-12 PROCEDURE — 82962 GLUCOSE BLOOD TEST: CPT | Performed by: INTERNAL MEDICINE

## 2024-01-12 PROCEDURE — 3077F SYST BP >= 140 MM HG: CPT | Performed by: INTERNAL MEDICINE

## 2024-01-12 PROCEDURE — 3051F HG A1C>EQUAL 7.0%<8.0%: CPT | Performed by: INTERNAL MEDICINE

## 2024-01-12 PROCEDURE — 99214 OFFICE O/P EST MOD 30 MIN: CPT | Performed by: INTERNAL MEDICINE

## 2024-01-12 RX ORDER — SEMAGLUTIDE 1.34 MG/ML
INJECTION, SOLUTION SUBCUTANEOUS
Qty: 4 ML | Refills: 3 | Status: SHIPPED | OUTPATIENT
Start: 2024-01-12

## 2024-01-12 RX ORDER — ATENOLOL AND CHLORTHALIDONE TABLET 100; 25 MG/1; MG/1
TABLET ORAL
Qty: 90 TABLET | Refills: 3 | Status: SHIPPED | OUTPATIENT
Start: 2024-01-12

## 2024-01-12 RX ORDER — INSULIN GLARGINE 100 [IU]/ML
INJECTION, SOLUTION SUBCUTANEOUS
Qty: 10 ADJUSTABLE DOSE PRE-FILLED PEN SYRINGE | Refills: 3 | Status: SHIPPED | OUTPATIENT
Start: 2024-01-12

## 2024-01-12 RX ORDER — EPLERENONE 50 MG/1
TABLET, FILM COATED ORAL
Qty: 180 TABLET | Refills: 2 | Status: SHIPPED | OUTPATIENT
Start: 2024-01-12

## 2024-01-12 RX ORDER — TAMOXIFEN CITRATE 10 MG/1
TABLET ORAL
Qty: 180 TABLET | Refills: 3 | Status: SHIPPED | OUTPATIENT
Start: 2024-01-12

## 2024-01-12 ASSESSMENT — ENCOUNTER SYMPTOMS: EYES NEGATIVE: 1

## 2024-01-12 NOTE — PROGRESS NOTES
2024    Assessment:       Diagnosis Orders   1. Type 2 diabetes mellitus with complication (HCC)  POCT Glucose    Basic Metabolic Panel    Hemoglobin A1C    atenolol-chlorthalidone (TENORETIC) 100-25 MG per tablet    eplerenone (INSPRA) 50 MG tablet    Semaglutide, 1 MG/DOSE, (OZEMPIC, 1 MG/DOSE,) 4 MG/3ML SOPN    insulin glargine (LANTUS SOLOSTAR) 100 UNIT/ML injection pen      2. Hypothyroidism, unspecified type  TSH with Reflex    T4, Free      3. Essential hypertension  atenolol-chlorthalidone (TENORETIC) 100-25 MG per tablet    eplerenone (INSPRA) 50 MG tablet    tamoxifen (NOLVADEX) 10 MG tablet      4. Hypokalemia        5. Gynecomastia              PLAN:     Orders Placed This Encounter   Procedures    Basic Metabolic Panel     Standing Status:   Future     Standing Expiration Date:   2025    TSH with Reflex     Standing Status:   Future     Standing Expiration Date:   2025    Hemoglobin A1C     Standing Status:   Future     Standing Expiration Date:   2025    T4, Free     Standing Status:   Future     Standing Expiration Date:   2025    POCT Glucose     Orders Placed This Encounter   Medications    atenolol-chlorthalidone (TENORETIC) 100-25 MG per tablet     Sig: TAKE 1 TABLET BY MOUTH ONE TIME A DAY     Dispense:  90 tablet     Refill:  3    eplerenone (INSPRA) 50 MG tablet     Sig: TAKE ONE TABLET BY MOUTH TWICE A DAY     Dispense:  180 tablet     Refill:  2    Semaglutide, 1 MG/DOSE, (OZEMPIC, 1 MG/DOSE,) 4 MG/3ML SOPN     Si mg once a week     Dispense:  4 mL     Refill:  3    insulin glargine (LANTUS SOLOSTAR) 100 UNIT/ML injection pen     Si units in the evening daily     Dispense:  10 Adjustable Dose Pre-filled Pen Syringe     Refill:  3    tamoxifen (NOLVADEX) 10 MG tablet     Sig: TAKE 1 TABLET BY MOUTH 2 TIMES A DAY     Dispense:  180 tablet     Refill:  3   Continue current medication for diabetes or thyroid replacement educated about taking extra potassium pills

## 2024-01-15 DIAGNOSIS — E11.8 TYPE 2 DIABETES MELLITUS WITH COMPLICATION (HCC): ICD-10-CM

## 2024-01-15 RX ORDER — ACYCLOVIR 400 MG/1
TABLET ORAL
Qty: 9 EACH | Refills: 3 | Status: SHIPPED | OUTPATIENT
Start: 2024-01-15

## 2024-01-31 ENCOUNTER — OFFICE VISIT (OUTPATIENT)
Dept: FAMILY MEDICINE CLINIC | Age: 64
End: 2024-01-31
Payer: COMMERCIAL

## 2024-01-31 VITALS
OXYGEN SATURATION: 99 % | TEMPERATURE: 97.7 F | SYSTOLIC BLOOD PRESSURE: 128 MMHG | HEART RATE: 81 BPM | BODY MASS INDEX: 34.13 KG/M2 | WEIGHT: 252 LBS | DIASTOLIC BLOOD PRESSURE: 80 MMHG | HEIGHT: 72 IN

## 2024-01-31 DIAGNOSIS — S39.012A LOW BACK STRAIN, INITIAL ENCOUNTER: Primary | ICD-10-CM

## 2024-01-31 PROCEDURE — 99213 OFFICE O/P EST LOW 20 MIN: CPT | Performed by: NURSE PRACTITIONER

## 2024-01-31 PROCEDURE — 3079F DIAST BP 80-89 MM HG: CPT | Performed by: NURSE PRACTITIONER

## 2024-01-31 PROCEDURE — 3074F SYST BP LT 130 MM HG: CPT | Performed by: NURSE PRACTITIONER

## 2024-01-31 RX ORDER — ORPHENADRINE CITRATE 100 MG/1
100 TABLET, EXTENDED RELEASE ORAL 2 TIMES DAILY
Qty: 20 TABLET | Refills: 0 | Status: SHIPPED | OUTPATIENT
Start: 2024-01-31 | End: 2024-02-10

## 2024-01-31 RX ORDER — METHYLPREDNISOLONE 4 MG/1
TABLET ORAL
Qty: 1 KIT | Refills: 0 | Status: SHIPPED | OUTPATIENT
Start: 2024-01-31 | End: 2024-02-06

## 2024-01-31 ASSESSMENT — PATIENT HEALTH QUESTIONNAIRE - PHQ9
2. FEELING DOWN, DEPRESSED OR HOPELESS: 0
SUM OF ALL RESPONSES TO PHQ9 QUESTIONS 1 & 2: 0
SUM OF ALL RESPONSES TO PHQ QUESTIONS 1-9: 0
1. LITTLE INTEREST OR PLEASURE IN DOING THINGS: 0
SUM OF ALL RESPONSES TO PHQ QUESTIONS 1-9: 0

## 2024-01-31 ASSESSMENT — ENCOUNTER SYMPTOMS
ABDOMINAL PAIN: 0
BACK PAIN: 1

## 2024-01-31 NOTE — PROGRESS NOTES
2024    Luis Becerra (:  1960) is a 63 y.o. male, Established patient, here for evaluation of the following chief complaint(s):  Lower Back Pain (Pt states sudden onset of lower back pain x 2 days, has gotten worse over time.)      Vitals:    24 0917   BP: 128/80   Pulse: 81   Temp: 97.7 °F (36.5 °C)   SpO2: 99%       SUBJECTIVE/OBJECTIVE:    Pt has hx for low back strains/ pain intermittently. He denies any known injury. He noticed this episode started just from getting up from a seated position. It does not feel like sciatica to him, just a strain to lower back when going to stand or push carts.     Back Pain  This is a recurrent problem. The current episode started yesterday. The problem occurs constantly. The problem is unchanged. The pain is present in the sacro-iliac and lumbar spine. The quality of the pain is described as shooting (dull). The pain is at a severity of 0/10 (7 when actively moving). The patient is experiencing no pain. The symptoms are aggravated by position and standing. Pertinent negatives include no abdominal pain, chest pain, dysuria, fever, headaches, leg pain, numbness, tingling or weakness. Treatments tried: toradol. The treatment provided no relief.       Review of Systems   Constitutional:  Negative for fever.   Cardiovascular:  Negative for chest pain.   Gastrointestinal:  Negative for abdominal pain.   Genitourinary:  Negative for dysuria.   Musculoskeletal:  Positive for back pain.   Neurological:  Negative for tingling, weakness, numbness and headaches.       Physical Exam  Vitals and nursing note reviewed.   Constitutional:       General: He is not in acute distress.     Appearance: Normal appearance. He is not ill-appearing, toxic-appearing or diaphoretic.   HENT:      Head: Normocephalic.      Mouth/Throat:      Mouth: Mucous membranes are moist.   Cardiovascular:      Rate and Rhythm: Normal rate.   Pulmonary:      Effort: Pulmonary effort is normal.

## 2024-04-15 DIAGNOSIS — E03.9 HYPOTHYROIDISM, UNSPECIFIED TYPE: ICD-10-CM

## 2024-04-15 DIAGNOSIS — E11.8 TYPE 2 DIABETES MELLITUS WITH COMPLICATION (HCC): ICD-10-CM

## 2024-04-15 LAB
ANION GAP SERPL CALCULATED.3IONS-SCNC: 11 MEQ/L (ref 9–15)
BUN SERPL-MCNC: 21 MG/DL (ref 8–23)
CALCIUM SERPL-MCNC: 9.4 MG/DL (ref 8.5–9.9)
CHLORIDE SERPL-SCNC: 102 MEQ/L (ref 95–107)
CO2 SERPL-SCNC: 30 MEQ/L (ref 20–31)
CREAT SERPL-MCNC: 1.1 MG/DL (ref 0.7–1.2)
ESTIMATED AVERAGE GLUCOSE: 166 MG/DL
GLUCOSE SERPL-MCNC: 108 MG/DL (ref 70–99)
HBA1C MFR BLD: 7.4 % (ref 4–6)
POTASSIUM SERPL-SCNC: 3.2 MEQ/L (ref 3.4–4.9)
SODIUM SERPL-SCNC: 143 MEQ/L (ref 135–144)
T4 FREE SERPL-MCNC: 1.19 NG/DL (ref 0.84–1.68)
TSH REFLEX: 2.22 UIU/ML (ref 0.44–3.86)

## 2024-04-19 ENCOUNTER — OFFICE VISIT (OUTPATIENT)
Dept: ENDOCRINOLOGY | Age: 64
End: 2024-04-19
Payer: COMMERCIAL

## 2024-04-19 VITALS
HEIGHT: 72 IN | SYSTOLIC BLOOD PRESSURE: 140 MMHG | HEART RATE: 63 BPM | DIASTOLIC BLOOD PRESSURE: 80 MMHG | OXYGEN SATURATION: 98 % | BODY MASS INDEX: 34.27 KG/M2 | WEIGHT: 253 LBS | RESPIRATION RATE: 16 BRPM

## 2024-04-19 DIAGNOSIS — E87.6 HYPOKALEMIA: ICD-10-CM

## 2024-04-19 DIAGNOSIS — I10 ESSENTIAL HYPERTENSION: ICD-10-CM

## 2024-04-19 DIAGNOSIS — E11.8 TYPE 2 DIABETES MELLITUS WITH COMPLICATION (HCC): Primary | ICD-10-CM

## 2024-04-19 DIAGNOSIS — E03.9 HYPOTHYROIDISM, UNSPECIFIED TYPE: ICD-10-CM

## 2024-04-19 LAB
CHP ED QC CHECK: NORMAL
GLUCOSE BLD-MCNC: 200 MG/DL

## 2024-04-19 PROCEDURE — 3077F SYST BP >= 140 MM HG: CPT | Performed by: INTERNAL MEDICINE

## 2024-04-19 PROCEDURE — 82962 GLUCOSE BLOOD TEST: CPT | Performed by: INTERNAL MEDICINE

## 2024-04-19 PROCEDURE — 3079F DIAST BP 80-89 MM HG: CPT | Performed by: INTERNAL MEDICINE

## 2024-04-19 PROCEDURE — 99214 OFFICE O/P EST MOD 30 MIN: CPT | Performed by: INTERNAL MEDICINE

## 2024-04-19 PROCEDURE — 3051F HG A1C>EQUAL 7.0%<8.0%: CPT | Performed by: INTERNAL MEDICINE

## 2024-04-19 RX ORDER — TELMISARTAN 80 MG/1
TABLET ORAL
Qty: 90 TABLET | Refills: 3 | Status: SHIPPED | OUTPATIENT
Start: 2024-04-19

## 2024-04-19 RX ORDER — LEVOTHYROXINE SODIUM 0.12 MG/1
TABLET ORAL
Qty: 90 TABLET | Refills: 3 | Status: SHIPPED | OUTPATIENT
Start: 2024-04-19

## 2024-04-19 RX ORDER — SEMAGLUTIDE 1.34 MG/ML
INJECTION, SOLUTION SUBCUTANEOUS
Qty: 9 ML | Refills: 3 | Status: SHIPPED | OUTPATIENT
Start: 2024-04-19

## 2024-04-19 NOTE — PROGRESS NOTES
Component Value Date    TESTM 1164 (H) 09/08/2020    TESTM 875 02/20/2020    TESTM 891 (H) 08/26/2019     Lab Results   Component Value Date    TSH 2.210 06/28/2023    TSH 2.320 04/03/2023    TSH 1.440 09/08/2020    TSHREFLEX 2.220 04/15/2024    TSHREFLEX 1.550 01/08/2024    TSHREFLEX 2.460 10/09/2023    T4FREE 1.19 04/15/2024    T4FREE 1.18 01/08/2024    T4FREE 1.40 10/09/2023     No results found for: \"TPOABS\"    Review of Systems   Constitutional:  Positive for fatigue.   Cardiovascular: Negative.  Negative for palpitations.   Endocrine: Negative.    Neurological:  Negative for tremors.   All other systems reviewed and are negative.      Objective:   Physical Exam  Vitals reviewed.   Constitutional:       General: He is not in acute distress.     Appearance: Normal appearance. He is obese.   HENT:      Head: Normocephalic and atraumatic.      Right Ear: External ear normal.      Left Ear: External ear normal.      Nose: Nose normal.   Eyes:      General: No scleral icterus.        Right eye: No discharge.         Left eye: No discharge.      Extraocular Movements: Extraocular movements intact.      Conjunctiva/sclera: Conjunctivae normal.   Cardiovascular:      Rate and Rhythm: Normal rate.   Pulmonary:      Effort: Pulmonary effort is normal.   Musculoskeletal:         General: Normal range of motion.      Cervical back: Normal range of motion and neck supple.   Skin:     Findings: No lesion or rash.   Neurological:      General: No focal deficit present.      Mental Status: He is alert and oriented to person, place, and time.   Psychiatric:         Mood and Affect: Mood normal.         Behavior: Behavior normal.

## 2024-04-24 ENCOUNTER — TELEPHONE (OUTPATIENT)
Dept: FAMILY MEDICINE CLINIC | Age: 64
End: 2024-04-24

## 2024-04-24 NOTE — TELEPHONE ENCOUNTER
Pt called asking to be seen by Dr. Frye. I did tell pt Dr. Frye is not taking in any new patients, but pt insists to see only Dr. Frye. If pt can get a call to confirm an appt or deny this request.

## 2024-04-29 DIAGNOSIS — I10 ESSENTIAL HYPERTENSION: ICD-10-CM

## 2024-04-29 RX ORDER — POTASSIUM CHLORIDE 750 MG/1
30 TABLET, FILM COATED, EXTENDED RELEASE ORAL DAILY
Qty: 270 TABLET | Refills: 3 | Status: SHIPPED | OUTPATIENT
Start: 2024-04-29

## 2024-04-30 ENCOUNTER — TELEPHONE (OUTPATIENT)
Dept: FAMILY MEDICINE CLINIC | Age: 64
End: 2024-04-30

## 2024-04-30 NOTE — TELEPHONE ENCOUNTER
Patient will be accepted as new to provider per Dr. Frye     *Ok to schedule    Tried to reach patient, left voicemail

## 2024-05-06 ENCOUNTER — HOSPITAL ENCOUNTER (EMERGENCY)
Age: 64
Discharge: HOME | End: 2024-05-06
Attending: EMERGENCY MEDICINE
Payer: COMMERCIAL

## 2024-05-06 ENCOUNTER — NURSE TRIAGE (OUTPATIENT)
Dept: OTHER | Facility: CLINIC | Age: 64
End: 2024-05-06

## 2024-05-06 VITALS
HEIGHT: 72 IN | SYSTOLIC BLOOD PRESSURE: 178 MMHG | TEMPERATURE: 98.6 F | BODY MASS INDEX: 32.51 KG/M2 | DIASTOLIC BLOOD PRESSURE: 102 MMHG | HEART RATE: 81 BPM | RESPIRATION RATE: 16 BRPM | WEIGHT: 240 LBS | OXYGEN SATURATION: 97 %

## 2024-05-06 DIAGNOSIS — M54.10 RADICULOPATHY, UNSPECIFIED SPINAL REGION: Primary | ICD-10-CM

## 2024-05-06 PROCEDURE — 99283 EMERGENCY DEPT VISIT LOW MDM: CPT

## 2024-05-06 PROCEDURE — 6370000000 HC RX 637 (ALT 250 FOR IP): Performed by: EMERGENCY MEDICINE

## 2024-05-06 RX ORDER — PREDNISONE 20 MG/1
40 TABLET ORAL DAILY
Qty: 10 TABLET | Refills: 0 | Status: SHIPPED | OUTPATIENT
Start: 2024-05-06 | End: 2024-05-11

## 2024-05-06 RX ORDER — PREDNISONE 20 MG/1
60 TABLET ORAL ONCE
Status: COMPLETED | OUTPATIENT
Start: 2024-05-06 | End: 2024-05-06

## 2024-05-06 RX ORDER — AMLODIPINE BESYLATE 5 MG/1
5 TABLET ORAL ONCE
Status: COMPLETED | OUTPATIENT
Start: 2024-05-06 | End: 2024-05-06

## 2024-05-06 RX ORDER — LIDOCAINE 50 MG/G
1 PATCH TOPICAL DAILY
Qty: 10 PATCH | Refills: 0 | Status: SHIPPED | OUTPATIENT
Start: 2024-05-06 | End: 2024-05-16

## 2024-05-06 RX ORDER — NAPROXEN 250 MG/1
500 TABLET ORAL ONCE
Status: COMPLETED | OUTPATIENT
Start: 2024-05-06 | End: 2024-05-06

## 2024-05-06 RX ORDER — LIDOCAINE 4 G/G
1 PATCH TOPICAL DAILY
Status: DISCONTINUED | OUTPATIENT
Start: 2024-05-06 | End: 2024-05-06 | Stop reason: HOSPADM

## 2024-05-06 RX ADMIN — NAPROXEN 500 MG: 250 TABLET ORAL at 08:43

## 2024-05-06 RX ADMIN — AMLODIPINE BESYLATE 5 MG: 5 TABLET ORAL at 09:06

## 2024-05-06 RX ADMIN — PREDNISONE 60 MG: 20 TABLET ORAL at 08:44

## 2024-05-06 ASSESSMENT — PAIN DESCRIPTION - LOCATION
LOCATION: ARM

## 2024-05-06 ASSESSMENT — PAIN - FUNCTIONAL ASSESSMENT
PAIN_FUNCTIONAL_ASSESSMENT: PREVENTS OR INTERFERES SOME ACTIVE ACTIVITIES AND ADLS
PAIN_FUNCTIONAL_ASSESSMENT: 0-10

## 2024-05-06 ASSESSMENT — ENCOUNTER SYMPTOMS
NAUSEA: 0
CHEST TIGHTNESS: 0
EYE PAIN: 0
SHORTNESS OF BREATH: 0
SORE THROAT: 0
ABDOMINAL PAIN: 0
VOMITING: 0

## 2024-05-06 ASSESSMENT — PAIN DESCRIPTION - ONSET
ONSET: ON-GOING
ONSET: ON-GOING

## 2024-05-06 ASSESSMENT — PAIN DESCRIPTION - DESCRIPTORS
DESCRIPTORS: ACHING
DESCRIPTORS: ACHING
DESCRIPTORS: DISCOMFORT

## 2024-05-06 ASSESSMENT — LIFESTYLE VARIABLES
HOW MANY STANDARD DRINKS CONTAINING ALCOHOL DO YOU HAVE ON A TYPICAL DAY: PATIENT DOES NOT DRINK
HOW OFTEN DO YOU HAVE A DRINK CONTAINING ALCOHOL: NEVER

## 2024-05-06 ASSESSMENT — PAIN DESCRIPTION - ORIENTATION
ORIENTATION: LEFT

## 2024-05-06 ASSESSMENT — PAIN DESCRIPTION - PAIN TYPE: TYPE: ACUTE PAIN

## 2024-05-06 ASSESSMENT — PAIN DESCRIPTION - FREQUENCY
FREQUENCY: CONTINUOUS
FREQUENCY: CONTINUOUS

## 2024-05-06 ASSESSMENT — PAIN SCALES - GENERAL
PAINLEVEL_OUTOF10: 8

## 2024-05-06 NOTE — ED PROVIDER NOTES
[facility] ED  EMERGENCY DEPARTMENT ENCOUNTER      Pt Name: Luis Becerra  MRN: [id number]  Birthdate 1960  Date of evaluation: 5/6/2024  Provider: Blanka Siegel DO    CHIEF COMPLAINT       Chief Complaint   Patient presents with    Arm Pain     Left arm pain states the pain starts I the shoulder and into the bicep. Denies injury left thumb and index finger numb per pt          HISTORY OF PRESENT ILLNESS   (Location/Symptom, Timing/Onset, Context/Setting, Quality, Duration, Modifying Factors, Severity)  Note limiting factors.   Luis Becerra is a 63 y.o. male who presents to the emergency department .  Patient comes in with severe pain in the left upper arm with some radiation down the arm and then numbness in his thumb and index finger.  No injury.  No neck pain but he did have some discomfort on the very side of the neck on the left side.  He does admit to sleeping on his left side.  No usual issues with his neck or his shoulder.  It does not really hurt more when he moves it.  Patient tried meloxicam and Tylenol without relief.    History of hyperaldosteronism hypertension osteoarthritis type 2 diabetes    HPI    Nursing Notes were reviewed.    REVIEW OF SYSTEMS    (2-9 systems for level 4, 10 or more for level 5)     Review of Systems   Constitutional:  Negative for activity change, appetite change and fatigue.   HENT:  Negative for congestion and sore throat.    Eyes:  Negative for pain and visual disturbance.   Respiratory:  Negative for chest tightness and shortness of breath.    Cardiovascular:  Negative for chest pain.   Gastrointestinal:  Negative for abdominal pain, nausea and vomiting.   Endocrine: Negative for polydipsia.   Genitourinary:  Negative for flank pain and urgency.   Musculoskeletal:  Positive for myalgias. Negative for gait problem and neck stiffness.   Skin:  Negative for rash.   Neurological:  Positive for numbness. Negative for weakness, light-headedness and headaches.

## 2024-05-06 NOTE — TELEPHONE ENCOUNTER
Location of patient:[place]    Subjective: Caller states \"My shoulder hurts at an 8 after pain meds and I don't know what I did.\"     Current Symptoms: left shoulder pain. No known injury. Doesn't hurt worse with movement.     Onset: 12 hours ago; worsening    Associated Symptoms: pain radiating to bicep. Thumb and index finger numb    Pain Severity: 8-10/10; throbbing, spasms; constant    Temperature: denies     What has been tried: Meloxicam, muscle relaxant. Tylenol.     LMP: NA Pregnant: NA    Recommended disposition: Go to ED Now    Care advice provided, patient verbalizes understanding; denies any other questions or concerns; instructed to call back for any new or worsening symptoms.    Patient/caller agrees to proceed to nearest Emergency Department    Attention Provider:  Thank you for allowing me to participate in the care of your patient.  The patient was connected to triage in response to symptoms provided.   Please do not respond through this encounter as the response is not directed to a shared pool.      Reason for Disposition   Age > 40 and no obvious cause and pain even when not moving the arm  (Exception: Pain is clearly made worse by moving arm or bending neck.)    Protocols used: Shoulder Pain-ADULT-[place]

## 2024-05-06 NOTE — ED NOTES
Pt c/o LUE pain, denies trauma, sensation and movement intact, ROM WNL with discomfort, no redness or edema noted, pulses palpable.

## 2024-05-07 ENCOUNTER — CARE COORDINATION (OUTPATIENT)
Dept: OTHER | Facility: CLINIC | Age: 64
End: 2024-05-07

## 2024-05-07 NOTE — CARE COORDINATION
ED Follow Up Call    2024    Patient: Luis Becerra Patient : 1960   MRN: [id number]  Reason for Admission: Radiculopathy, unspecified spinal region  Discharge Date: 24      ACM contacted patient for introduction to [facility] Care Management related to ED visit for left shoulder pain. Verified name and  with patient as identifiers. Patient declines care management at this time, stating no needs at this time.    Patient reports doing better and able to work.  Patient's most recent A1C 7.4% on 4/15/24.  Patient encouraged to outreach this ACM for any care coordination needs including but not limited to diabetes education.    Information sent to patient via [employer]:    [facility] information      ACM provided contact information for self referral if patient would need care management in the future. ACM will sign off at this time.

## 2024-05-13 DIAGNOSIS — S39.012A LOW BACK STRAIN, INITIAL ENCOUNTER: Primary | ICD-10-CM

## 2024-05-13 RX ORDER — HYDROCODONE BITARTRATE AND ACETAMINOPHEN 5; 325 MG/1; MG/1
1 TABLET ORAL EVERY 12 HOURS PRN
Qty: 14 TABLET | Refills: 0 | Status: SHIPPED | OUTPATIENT
Start: 2024-05-13 | End: 2024-05-20

## 2024-05-15 ENCOUNTER — OFFICE VISIT (OUTPATIENT)
Dept: NEUROSURGERY | Age: 64
End: 2024-05-15
Payer: COMMERCIAL

## 2024-05-15 VITALS
TEMPERATURE: 97.4 F | HEIGHT: 72 IN | SYSTOLIC BLOOD PRESSURE: 160 MMHG | BODY MASS INDEX: 32.51 KG/M2 | WEIGHT: 240 LBS | DIASTOLIC BLOOD PRESSURE: 90 MMHG

## 2024-05-15 DIAGNOSIS — M48.02 CERVICAL STENOSIS OF SPINE: Primary | ICD-10-CM

## 2024-05-15 PROCEDURE — 3077F SYST BP >= 140 MM HG: CPT | Performed by: NEUROLOGICAL SURGERY

## 2024-05-15 PROCEDURE — 99204 OFFICE O/P NEW MOD 45 MIN: CPT | Performed by: NEUROLOGICAL SURGERY

## 2024-05-15 PROCEDURE — 3080F DIAST BP >= 90 MM HG: CPT | Performed by: NEUROLOGICAL SURGERY

## 2024-05-15 RX ORDER — TIZANIDINE 4 MG/1
4 TABLET ORAL 3 TIMES DAILY PRN
Qty: 30 TABLET | Refills: 0 | Status: SHIPPED | OUTPATIENT
Start: 2024-05-15

## 2024-05-15 ASSESSMENT — ENCOUNTER SYMPTOMS
ABDOMINAL DISTENTION: 0
TROUBLE SWALLOWING: 0
SHORTNESS OF BREATH: 0
ABDOMINAL PAIN: 0
BACK PAIN: 0
EYE PAIN: 0

## 2024-05-15 NOTE — PROGRESS NOTES
Patient Name: Luis Becerra : 1960        Date: 5/15/2024      Type of Appt: Consult    Reason for appt: PSR: LEFT ARM AND BACK PAIN WITH NUMBNESS. NO STUDIES.     Referred by: PSR    Studies done: No new Studies     Physical therapy: yes or no    Smoking: Yes or No    REVIEW OF SYSTEMS:    Rash   Difficulty Urinating Nausea     Fever   Headaches  Bruising/Bleeding Easily     Hearing loss  Constipation  Sleep Disturbance    Shortness of breath Diarrhea  Neck Pain  Back Pain   
supply: 7 days. Take lowest dose possible to manage pain Max Daily Amount: 2 tablets 5/13/24 5/20/24  Jeff Frye MD   lidocaine (LIDODERM) 5 % Place 1 patch onto the skin daily for 10 days 12 hours on, 12 hours off. 5/6/24 5/16/24  Blanka Siegel DO   potassium chloride (KLOR-CON) 10 MEQ extended release tablet Take 3 tablets by mouth daily 4/29/24   Suresh Murillo MD   Semaglutide, 1 MG/DOSE, (OZEMPIC, 1 MG/DOSE,) 4 MG/3ML SOPN 1 mg once a week 4/19/24   Suresh Murillo MD   levothyroxine (SYNTHROID) 125 MCG tablet TAKE 1 TABLET BY MOUTH ONE TIME A DAY 4/19/24   Suresh Murillo MD   telmisartan (MICARDIS) 80 MG tablet TAKE 1 TABLET BY MOUTH ONE TIME A DAY 4/19/24   Suresh Murillo MD   Continuous Blood Gluc Sensor (DEXCOM G7 SENSOR) MISC Change every 10 days 1/15/24   Suresh Murillo MD   atenolol-chlorthalidone (TENORETIC) 100-25 MG per tablet TAKE 1 TABLET BY MOUTH ONE TIME A DAY 1/12/24   Suresh Murillo MD   eplerenone (INSPRA) 50 MG tablet TAKE ONE TABLET BY MOUTH TWICE A DAY 1/12/24   Suresh Murillo MD   insulin glargine (LANTUS SOLOSTAR) 100 UNIT/ML injection pen 30 units in the evening daily 1/12/24   Suresh Murillo MD   tamoxifen (NOLVADEX) 10 MG tablet TAKE 1 TABLET BY MOUTH 2 TIMES A DAY 1/12/24   Suresh Murillo MD   ketorolac (TORADOL) 10 MG tablet Take 1 tablet by mouth every 6 hours as needed for Pain 10/23/23 11/2/23  Dee Rodriguez APRN - CNP   amLODIPine (NORVASC) 5 MG tablet Take 1 tablet by mouth daily 10/13/23   Suresh Murillo MD Prodigy Twist Top Lancets 28G MISC TEST ONCE DAILY 8/24/23   Suresh Murillo MD   blood glucose test strips (PRODIGY NO CODING BLOOD GLUC) strip TEST ONCE DAILY 8/24/23   Chidi, Suresh, MD   meloxicam (MOBIC) 15 MG tablet Take 1 tablet by mouth daily 8/18/23   Dee Rodriguez APRN - CNP   Continuous Blood Gluc  (DEXCOM G7 ) CATHRYN Give 1  7/28/23   Suresh Murillo MD   Insulin Pen Needle (NOVOFINE PLUS PEN NEEDLE) 32G X 4 MM MISC 1 each by Does not apply route daily

## 2024-05-20 DIAGNOSIS — I10 ESSENTIAL HYPERTENSION: ICD-10-CM

## 2024-05-20 RX ORDER — AMLODIPINE BESYLATE 5 MG/1
5 TABLET ORAL DAILY
Qty: 90 TABLET | Refills: 1 | Status: SHIPPED | OUTPATIENT
Start: 2024-05-20

## 2024-05-20 RX ORDER — TIZANIDINE 4 MG/1
4 TABLET ORAL 3 TIMES DAILY PRN
Qty: 30 TABLET | Refills: 0 | OUTPATIENT
Start: 2024-05-20

## 2024-05-30 NOTE — PROGRESS NOTES
Subjective:      Patient ID: Luis Becerra is a 63 y.o. male New patient, here for evaluation of the following chief complaint(s):  Chief Complaint   Patient presents with    New Patient    Hypertension     Patient would like his norvasc raised to 10 mg       HPI    63-year-old male with a history of hypertension, diabetes, and hypothyroidism presents to establish continuity with me as his primary care doctor.    Essential hypertension: Compliant with Norvasc 5 mg orally daily and Tenoretic 100-25 mg orally daily.      Type 2 diabetes mellitus with complication (HCC): Lantus 30 units nightly  -  Hypothyroidism, unspecified type  -     levothyroxine (SYNTHROID) 125 MCG tablet; TAKE 1 TABLET BY MOUTH ONE TIME A DAY    Hyperaldosteronism (HCC)       At present he denies polyuria,  Polydipsia, constitutional, sinus, visual, cardiopulmonary, urologic, gastrointestinal, immunologic/hematologic, musculoskeletal, neurologic,dermatologic, or psychiatric complaints.          Current Outpatient Medications on File Prior to Visit   Medication Sig Dispense Refill    amLODIPine (NORVASC) 5 MG tablet Take 1 tablet by mouth daily 90 tablet 1    tiZANidine (ZANAFLEX) 4 MG tablet Take 1 tablet by mouth 3 times daily as needed (muscle spasm) 30 tablet 0    potassium chloride (KLOR-CON) 10 MEQ extended release tablet Take 3 tablets by mouth daily 270 tablet 3    tamoxifen (NOLVADEX) 10 MG tablet TAKE 1 TABLET BY MOUTH 2 TIMES A  tablet 3    ketorolac (TORADOL) 10 MG tablet Take 1 tablet by mouth every 6 hours as needed for Pain 40 tablet 0    Prodigy Twist Top Lancets 28G MISC TEST ONCE DAILY 100 each 3    blood glucose test strips (PRODIGY NO CODING BLOOD GLUC) strip TEST ONCE DAILY 100 strip 3    meloxicam (MOBIC) 15 MG tablet Take 1 tablet by mouth daily 30 tablet 3    Continuous Blood Gluc  (DEXCOM G7 ) CATHRYN Give 1  1 each 0    Insulin Pen Needle (NOVOFINE PLUS PEN NEEDLE) 32G X 4 MM MISC 1 each by

## 2024-05-31 ENCOUNTER — OFFICE VISIT (OUTPATIENT)
Dept: FAMILY MEDICINE CLINIC | Age: 64
End: 2024-05-31
Payer: COMMERCIAL

## 2024-05-31 ENCOUNTER — HOSPITAL ENCOUNTER (OUTPATIENT)
Dept: MRI IMAGING | Age: 64
Discharge: HOME | End: 2024-05-31
Attending: NEUROLOGICAL SURGERY
Payer: COMMERCIAL

## 2024-05-31 VITALS
SYSTOLIC BLOOD PRESSURE: 136 MMHG | HEIGHT: 72 IN | BODY MASS INDEX: 33.46 KG/M2 | RESPIRATION RATE: 14 BRPM | OXYGEN SATURATION: 98 % | DIASTOLIC BLOOD PRESSURE: 78 MMHG | WEIGHT: 247 LBS | HEART RATE: 89 BPM

## 2024-05-31 DIAGNOSIS — M54.12 CERVICAL RADICULOPATHY: Primary | ICD-10-CM

## 2024-05-31 DIAGNOSIS — E03.9 HYPOTHYROIDISM, UNSPECIFIED TYPE: ICD-10-CM

## 2024-05-31 DIAGNOSIS — I10 ESSENTIAL HYPERTENSION: ICD-10-CM

## 2024-05-31 DIAGNOSIS — R79.89 LOW SERUM TESTOSTERONE: ICD-10-CM

## 2024-05-31 DIAGNOSIS — E26.9 HYPERALDOSTERONISM (HCC): ICD-10-CM

## 2024-05-31 DIAGNOSIS — E11.8 TYPE 2 DIABETES MELLITUS WITH COMPLICATION (HCC): ICD-10-CM

## 2024-05-31 DIAGNOSIS — M48.02 CERVICAL STENOSIS OF SPINE: ICD-10-CM

## 2024-05-31 DIAGNOSIS — M54.12 CERVICAL RADICULOPATHY: ICD-10-CM

## 2024-05-31 PROCEDURE — 3051F HG A1C>EQUAL 7.0%<8.0%: CPT | Performed by: INTERNAL MEDICINE

## 2024-05-31 PROCEDURE — 3078F DIAST BP <80 MM HG: CPT | Performed by: INTERNAL MEDICINE

## 2024-05-31 PROCEDURE — 3075F SYST BP GE 130 - 139MM HG: CPT | Performed by: INTERNAL MEDICINE

## 2024-05-31 PROCEDURE — 99214 OFFICE O/P EST MOD 30 MIN: CPT | Performed by: INTERNAL MEDICINE

## 2024-05-31 PROCEDURE — 72141 MRI NECK SPINE W/O DYE: CPT

## 2024-05-31 RX ORDER — AMLODIPINE BESYLATE 10 MG/1
10 TABLET ORAL DAILY
Qty: 90 TABLET | Refills: 3 | Status: SHIPPED | OUTPATIENT
Start: 2024-05-31 | End: 2025-05-26

## 2024-05-31 RX ORDER — AMLODIPINE BESYLATE 10 MG/1
10 TABLET ORAL DAILY
Qty: 30 TABLET | Refills: 3 | Status: SHIPPED | OUTPATIENT
Start: 2024-05-31 | End: 2024-05-31

## 2024-05-31 RX ORDER — ATENOLOL AND CHLORTHALIDONE TABLET 100; 25 MG/1; MG/1
TABLET ORAL
Qty: 90 TABLET | Refills: 3 | Status: SHIPPED | OUTPATIENT
Start: 2024-05-31

## 2024-05-31 RX ORDER — ACYCLOVIR 400 MG/1
TABLET ORAL
Qty: 9 EACH | Refills: 3 | Status: SHIPPED | OUTPATIENT
Start: 2024-05-31

## 2024-05-31 RX ORDER — TELMISARTAN 80 MG/1
TABLET ORAL
Qty: 90 TABLET | Refills: 3 | Status: SHIPPED | OUTPATIENT
Start: 2024-05-31

## 2024-05-31 RX ORDER — INSULIN GLARGINE 100 [IU]/ML
INJECTION, SOLUTION SUBCUTANEOUS
Qty: 10 ADJUSTABLE DOSE PRE-FILLED PEN SYRINGE | Refills: 3 | Status: SHIPPED | OUTPATIENT
Start: 2024-05-31

## 2024-05-31 RX ORDER — SEMAGLUTIDE 1.34 MG/ML
INJECTION, SOLUTION SUBCUTANEOUS
Qty: 9 ML | Refills: 3 | Status: SHIPPED | OUTPATIENT
Start: 2024-05-31

## 2024-05-31 RX ORDER — HYDROCODONE BITARTRATE AND ACETAMINOPHEN 5; 325 MG/1; MG/1
1 TABLET ORAL EVERY 6 HOURS PRN
Qty: 28 TABLET | Refills: 0 | Status: SHIPPED | OUTPATIENT
Start: 2024-05-31 | End: 2024-06-07

## 2024-05-31 RX ORDER — EPLERENONE 50 MG/1
TABLET, FILM COATED ORAL
Qty: 180 TABLET | Refills: 2 | Status: SHIPPED | OUTPATIENT
Start: 2024-05-31

## 2024-05-31 RX ORDER — LEVOTHYROXINE SODIUM 0.12 MG/1
TABLET ORAL
Qty: 90 TABLET | Refills: 3 | Status: SHIPPED | OUTPATIENT
Start: 2024-05-31

## 2024-05-31 ASSESSMENT — ENCOUNTER SYMPTOMS
TROUBLE SWALLOWING: 0
SINUS PAIN: 0
EYE ITCHING: 0
PHOTOPHOBIA: 0
EYE PAIN: 0
WHEEZING: 0
VOICE CHANGE: 0
CONSTIPATION: 0
RECTAL PAIN: 0
EYE REDNESS: 0
RHINORRHEA: 0
VOMITING: 0
COLOR CHANGE: 0
COUGH: 0
NAUSEA: 0
CHEST TIGHTNESS: 0
ABDOMINAL DISTENTION: 0
SORE THROAT: 0
SHORTNESS OF BREATH: 0
EYE DISCHARGE: 0
BLOOD IN STOOL: 0
DIARRHEA: 0
SINUS PRESSURE: 0
FACIAL SWELLING: 0
APNEA: 0
BACK PAIN: 0
ABDOMINAL PAIN: 0

## 2024-06-02 LAB

## 2024-06-04 ENCOUNTER — TELEPHONE (OUTPATIENT)
Dept: FAMILY MEDICINE CLINIC | Age: 64
End: 2024-06-04

## 2024-06-04 NOTE — TELEPHONE ENCOUNTER
PA for dexcom g7 sensor, A message came back from [employer] that a PA is not needed for 3 sensors per 30 days

## 2024-06-06 ENCOUNTER — HOSPITAL ENCOUNTER (OUTPATIENT)
Dept: PHYSICAL THERAPY | Age: 64
Setting detail: THERAPIES SERIES
Discharge: HOME | End: 2024-06-06
Attending: NEUROLOGICAL SURGERY
Payer: COMMERCIAL

## 2024-06-06 DIAGNOSIS — E11.8 TYPE 2 DIABETES MELLITUS WITH COMPLICATION (HCC): ICD-10-CM

## 2024-06-06 DIAGNOSIS — R79.89 LOW SERUM TESTOSTERONE: ICD-10-CM

## 2024-06-06 DIAGNOSIS — E03.9 HYPOTHYROIDISM, UNSPECIFIED TYPE: ICD-10-CM

## 2024-06-06 LAB
ANION GAP SERPL CALCULATED.3IONS-SCNC: 14 MEQ/L (ref 9–15)
BUN SERPL-MCNC: 24 MG/DL (ref 8–23)
CALCIUM SERPL-MCNC: 9.4 MG/DL (ref 8.5–9.9)
CHLORIDE SERPL-SCNC: 101 MEQ/L (ref 95–107)
CO2 SERPL-SCNC: 26 MEQ/L (ref 20–31)
CREAT SERPL-MCNC: 1.15 MG/DL (ref 0.7–1.2)
ESTIMATED AVERAGE GLUCOSE: 171 MG/DL
GLUCOSE SERPL-MCNC: 142 MG/DL (ref 70–99)
HBA1C MFR BLD: 7.6 % (ref 4–6)
POTASSIUM SERPL-SCNC: 3.3 MEQ/L (ref 3.4–4.9)
SHBG SERPL-SCNC: 78 NMOL/L (ref 19–76)
SODIUM SERPL-SCNC: 141 MEQ/L (ref 135–144)
T4 FREE SERPL-MCNC: 1.29 NG/DL (ref 0.84–1.68)
TESTOST FREE SERPL-MCNC: 96 PG/ML (ref 47–244)
TESTOST SERPL-MCNC: 787 NG/DL (ref 193–740)
TSH SERPL-MCNC: 1.9 UIU/ML (ref 0.44–3.86)

## 2024-06-06 PROCEDURE — 97162 PT EVAL MOD COMPLEX 30 MIN: CPT

## 2024-06-06 PROCEDURE — 97140 MANUAL THERAPY 1/> REGIONS: CPT

## 2024-06-06 PROCEDURE — 97161 PT EVAL LOW COMPLEX 20 MIN: CPT

## 2024-06-06 ASSESSMENT — PAIN DESCRIPTION - DESCRIPTORS: DESCRIPTORS: ACHING;SHARP;TINGLING;NUMBNESS

## 2024-06-06 ASSESSMENT — PAIN SCALES - GENERAL: PAINLEVEL_OUTOF10: 4

## 2024-06-06 ASSESSMENT — PAIN DESCRIPTION - PAIN TYPE: TYPE: ACUTE PAIN

## 2024-06-06 ASSESSMENT — PAIN DESCRIPTION - LOCATION: LOCATION: NECK

## 2024-06-06 NOTE — THERAPY EVALUATION
IV/Heparin Lock [] Yes  [x] No 20  0    Gait/Transferring [] Impaired  [] Weak  [x] Normal/bedrest/immobile 20  10  0    Mental Status [] Forgets limitations  [x] Oriented to own ability 15  0       Total: 0     Based on the Assessment score: check the appropriate box.  [x]  No intervention needed   Low =   Score of 0-24  []  Use standard prevention interventions Moderate =  Score of 24-44   [] Discuss fall prevention strategies   [] Indicate moderate falls risk on eval  []  Use high risk prevention interventions High = Score of 45 and higher   [] Discuss fall prevention strategies   [] Provide supervision during treatment time      Minutes:  PT Individual Minutes  Time In: 0804  Time Out: 0852  Minutes: 48  Timed Code Treatment Minutes: 13 Minutes  Procedure Minutes: 35     Timed Activity Minutes Units   Ther Ex 35 2   Manual  13 1       Electronically signed by Nash Carlin, PT on 6/6/24 at 11:31 AM EDT

## 2024-06-06 NOTE — PROGRESS NOTES
Patient Name: Luis Becerra : 1960        Date: 6/10/2024      Type of Appt: Follow up    Reason for appt: FOLLOW UP WITH MRI     Pt last seen by Dr Judd on 5/15/2024    Studies done: 24 MRI OF CERVICAL SPINE AT [facility]    Physical therapy: YES    Conservative Treatments:  Physical Therapy: Yes  NSAID's: Yes  Narcotics: Yes  Muscle relaxants: No  Epidural injections: No    Smoking: TOBACCO:   reports that he has never smoked. He has never used smokeless tobacco.  ETOH:   reports no history of alcohol use.  RECREATIONAL DRUG USE:   Social History       REVIEW OF SYSTEMS:    Rash   Difficulty Urinating Nausea     Fever   Headaches  Bruising/Bleeding Easily     Hearing loss  Constipation  Sleep Disturbance    Shortness of breath Diarrhea  Neck Pain  Back Pain

## 2024-06-06 NOTE — PLAN OF CARE
[id number] [facility] [place] [address]  Phone: [phone]    [] Certification  [] Recertification [x]  Plan of Care  [] Progress Note [] Discharge      Referring Provider: Cruz Judd MD     From:  Nash Carlin, PT  Patient: Luis Becerra (63 y.o. male) : 1960 Date: 2024  Medical Diagnosis: Cervical stenosis of spine [M48.02]       Treatment Diagnosis: pain, decreased L sided cervical and L shoulder AROM, decreased motor function C6-C7, decreased cervical joint mobility, cervical muscle tightness, numbness and tingling.    Progress Report Period from:  2024  to 2024    Visits to Date: 1 No Show: 0 Cancelled Appts: 0    OBJECTIVE:   Short Term Goals - Time Frame for Short Term Goals: 3 weeks    Goals Current/Discharge status  Status   Short Term Goal 1: The pt will demonstrate improved LUE strength in triceps and wrist flexors to 4/5 in order to lift/carry with decreased pain  STG 1 Current Status:: 3/5 L triceps and wrist flexor   New   Long Term Goals - Time Frame for Long Term Goals : 6 weeks  Goals Current/ Discharge status Status   Long Term Goal 1: The pt will demonstrate improved LUE strength 5/5 in order to lift/carry with decreased pain    Strength LUE  L Shoulder Flexion: 4-/5  L Shoulder Extension: 4/5  L Shoulder ABduction: 4+/5  L Shoulder Internal Rotation: 4-/5  L Shoulder External Rotation: 3+/5  L Elbow Flexion: 5/5  L Elbow Extension: 3/5  L Wrist Flexion: 3/5  L Wrist Extension: 4+/5        New   Long Term Goal 2: The pt will demo improved cervical and L shoulder AROM in order to increase ease of ADL's    General AROM UE: Right WNL, AROM Assessed  AROM LUE (degrees)  L Shoulder Flexion (0-180): 130  L Shoulder ABduction (0-180): 135                 AROM Cervical Spine   Cervical spine general AROM: Generally limited LSB and LR  Cervical flexion: 42  Cervical extension: 50  Cervical right lateral:

## 2024-06-10 ENCOUNTER — OFFICE VISIT (OUTPATIENT)
Dept: NEUROSURGERY | Age: 64
End: 2024-06-10
Payer: COMMERCIAL

## 2024-06-10 ENCOUNTER — HOSPITAL ENCOUNTER (OUTPATIENT)
Dept: PHYSICAL THERAPY | Age: 64
Setting detail: THERAPIES SERIES
Discharge: HOME | End: 2024-06-10
Attending: NEUROLOGICAL SURGERY
Payer: COMMERCIAL

## 2024-06-10 VITALS
WEIGHT: 240 LBS | HEIGHT: 72 IN | DIASTOLIC BLOOD PRESSURE: 78 MMHG | TEMPERATURE: 97.6 F | SYSTOLIC BLOOD PRESSURE: 136 MMHG | BODY MASS INDEX: 32.51 KG/M2

## 2024-06-10 DIAGNOSIS — M48.02 CERVICAL STENOSIS OF SPINE: Primary | ICD-10-CM

## 2024-06-10 PROCEDURE — 3078F DIAST BP <80 MM HG: CPT | Performed by: NEUROLOGICAL SURGERY

## 2024-06-10 PROCEDURE — 3075F SYST BP GE 130 - 139MM HG: CPT | Performed by: NEUROLOGICAL SURGERY

## 2024-06-10 PROCEDURE — 99214 OFFICE O/P EST MOD 30 MIN: CPT | Performed by: NEUROLOGICAL SURGERY

## 2024-06-10 PROCEDURE — 97110 THERAPEUTIC EXERCISES: CPT

## 2024-06-10 PROCEDURE — 97140 MANUAL THERAPY 1/> REGIONS: CPT

## 2024-06-10 ASSESSMENT — ENCOUNTER SYMPTOMS
BACK PAIN: 0
COUGH: 0
TROUBLE SWALLOWING: 0
EYE PAIN: 0
ABDOMINAL DISTENTION: 0
SHORTNESS OF BREATH: 0
ABDOMINAL PAIN: 0

## 2024-06-10 ASSESSMENT — PAIN DESCRIPTION - PAIN TYPE: TYPE: ACUTE PAIN

## 2024-06-10 ASSESSMENT — PAIN SCALES - GENERAL: PAINLEVEL_OUTOF10: 2

## 2024-06-10 ASSESSMENT — PAIN DESCRIPTION - LOCATION: LOCATION: NECK

## 2024-06-10 ASSESSMENT — PAIN DESCRIPTION - DESCRIPTORS: DESCRIPTORS: DULL

## 2024-06-10 NOTE — PROGRESS NOTES
NEUROSURGERY and SPINE FOLLOW-UP NOTE      Patient Name: Luis Becerra  Patient : 1960  MRN: [id number]       PCP: Jeff Frye MD      History of Present Ilness: 63 y.o. presents with f/u. Sx have improved, went to PT. Still with neck pain 2-3/10 and LUE pain and numbness. Some weakness left shoulder and elbow. No RUE sx. Taking Advil once a day.  Overall symptoms are bearable.  He was previously told several years ago that he had an impingement in the left shoulder but has not seen orthopedics for several years.    Chief Complaint   Patient presents with    Neck Pain          Conservative Treatments:  Physical Therapy: No  NSAID's: Yes  Narcotics: Yes  Muscle relaxants: No  Epidural injections: No      Past Medical History:        Diagnosis Date    Gynecomastia     History of hypokalemia     Hyperaldosteronism, unspecified (HCC)     Hypertension     Hypogonadism male     Hypokalemia     Hypothyroidism     Osteoarthritis     Type II or unspecified type diabetes mellitus without mention of complication, not stated as uncontrolled        Past Surgical History:        Procedure Laterality Date    COLONOSCOPY N/A 2021    COLORECTAL CANCER SCREENING, HIGH RISK performed by Jerrod Lawton MD at [facility]    JOINT REPLACEMENT      left quadracep tendon rupture repair w/ anchors    MANDIBLE FRACTURE SURGERY      TONSILLECTOMY         Home Medications:   Prior to Admission medications    Medication Sig Start Date End Date Taking? Authorizing Provider   eplerenone (INSPRA) 50 MG tablet TAKE ONE TABLET BY MOUTH TWICE A DAY 24   Jeff Frye MD   insulin glargine (LANTUS SOLOSTAR) 100 UNIT/ML injection pen 30 units in the evening daily 24   Jeff Frye MD   atenolol-chlorthalidone (TENORETIC) 100-25 MG per tablet TAKE 1 TABLET BY MOUTH ONE TIME A DAY 24   Jeff Frye MD   levothyroxine (SYNTHROID) 125 MCG tablet TAKE 1 TABLET BY MOUTH ONE TIME A DAY 24   Uday

## 2024-06-10 NOTE — PROGRESS NOTES
increased rad sx's into LUE, some improvement w/ head positioned into slight cervical flex; total manual time 10 mins  Treatment Reasoning  Limitations addressed: Joint motion, Tissue extensibility      *Indicates exercise, modality, or manual techniques to be initiated when appropriate    Objective Measures:      LTG 2 Current Status:: 6/10/24 cervical flex 50 ext 50 L SB 25 R SB 30 L rot 40 R rot 70    Assessment:   Body Structures, Functions, Activity Limitations Requiring Skilled Therapeutic Intervention: Decreased functional mobility , Decreased ROM, Decreased body mechanics, Decreased tolerance to work activity, Decreased strength, Decreased sensation, Increased pain, Decreased posture  Assessment: Initiated tx per POC for increased AROM and decreased pain/rad sx's w/ functional mobility. Pt reports almost all activity induces pain and/or rad sx's in LUE, but reports \"it's manageable.\" Trialed manual cervical traction w/ pt reporting increase in rad sx's  w/ slight decrease when in slight cervical flex. Further decrease w/ PA mobs. Progressed HEP w/ pt verbalizing good understanding.  Treatment Diagnosis: pain, decreased L sided cervical and L shoulder AROM, decreased motor function C6-C7, decreased cervical joint mobility, cervical muscle tightness, numbness and tingling.  Therapy Prognosis: Good       Patient Education: [] NA  HEP, anatomy of condition    Post-Pain Assessment:       Pain Rating (0-10 pain scale):   2/10   Location and pain description same as pre-treatment unless indicated.   Action: [] NA   [x] Perform HEP  [] Meds as prescribed  [] Modalities as prescribed   [] Call Physician     GOALS   Patient Goal(s): Patient Goals : Avoid surgery    Short Term Goals Completed by 3 weeks Goal Status   STG 1 The pt will demonstrate improved LUE strength in triceps and wrist flexors to 4/5 in order to lift/carry with decreased pain In progress     Long Term Goals Completed by 6 weeks Goal Status   LTG 1

## 2024-06-13 ENCOUNTER — HOSPITAL ENCOUNTER (OUTPATIENT)
Dept: PHYSICAL THERAPY | Age: 64
Setting detail: THERAPIES SERIES
Discharge: HOME | End: 2024-06-13
Attending: NEUROLOGICAL SURGERY
Payer: COMMERCIAL

## 2024-06-13 PROCEDURE — 97110 THERAPEUTIC EXERCISES: CPT

## 2024-06-13 ASSESSMENT — PAIN DESCRIPTION - DESCRIPTORS: DESCRIPTORS: DULL;ACHING

## 2024-06-13 ASSESSMENT — PAIN SCALES - GENERAL: PAINLEVEL_OUTOF10: 1

## 2024-06-13 ASSESSMENT — PAIN DESCRIPTION - PAIN TYPE: TYPE: ACUTE PAIN

## 2024-06-13 ASSESSMENT — PAIN DESCRIPTION - LOCATION: LOCATION: NECK

## 2024-06-17 ENCOUNTER — HOSPITAL ENCOUNTER (OUTPATIENT)
Dept: PHYSICAL THERAPY | Age: 64
Setting detail: THERAPIES SERIES
Discharge: HOME | End: 2024-06-17
Attending: NEUROLOGICAL SURGERY
Payer: COMMERCIAL

## 2024-06-17 NOTE — PROGRESS NOTES
Therapy                            Cancellation/No-show Note      Date: 2024  Patient: Luis Becerra (63 y.o. male)  : 1960  MRN:  [id number]  Referring Physician: Cruz Judd MD    Medical Diagnosis: Cervical stenosis of spine [M48.02]      Visit Information:  Visits to Date 3   No Show/Cancelled Appts: 0 / 1      For today's appointment patient:  [x]  Cancelled  []  Rescheduled appointment  []  No-show   []  Called pt to remind of next appointment     Reason given by patient:  [x]  Patient ill  []  Conflicting appointment  []  No transportation    []  Conflict with work  []  No reason given  []  Other:      [] Pt has future appointments scheduled, no follow up needed  [] Pt requests to be on hold.    Reason:   If > 2 weeks please discuss with therapist.  [] Therapist to call pt for follow up     Comments:   Pt is ill    Signature: Electronically signed by Nash Carlin PT on 24 at 8:03 AM EDT     SUBJECTIVE:     CC: Abdi Desai is an 40 year old male who presents for preventative health visit.     Physical  Annual:     Getting at least 3 servings of Calcium per day::  Yes    Bi-annual eye exam::  Yes    Dental care twice a year::  Yes    Sleep apnea or symptoms of sleep apnea::  None    Diet::  Regular (no restrictions)    Frequency of exercise::  4-5 days/week    Duration of exercise::  45-60 minutes    Taking medications regularly::  Not Applicable    Additional concerns today::  YES  Additional: 1 lump back of neck, no pain, not growing, hard. Some neck stiffness.    Patient otherwise healthy with no additional concerns.    Today's PHQ-2 Score:   PHQ-2 ( 1999 [employer]) 1/27/2017   Q1: Little interest or pleasure in doing things 0   Q2: Feeling down, depressed or hopeless 0   PHQ-2 Score 0   Little interest or pleasure in doing things Not at all   Feeling down, depressed or hopeless Not at all   PHQ-2 Score 0       Abuse: Current or Past(Physical, Sexual or Emotional)- No  Do you feel safe in your environment - Yes    Social History   Substance Use Topics     Smoking status: Never Smoker      Smokeless tobacco: Not on file     Alcohol Use: Yes      Comment: 1-2 drinks a week      The patient does not drink >3 drinks per day nor >7 drinks per week.    Last PSA: No results found for: PSA    Recent Labs   Lab Test  01/30/13   0843  10/20/10   0928   CHOL  160  171   HDL  47  44   LDL  105  111   TRIG  44  78   CHOLHDLRATIO  3.4  3.9     Reviewed orders with patient. Reviewed health maintenance and updated orders accordingly - Yes    All Histories reviewed and updated in Epic.    Work for .     3 kids. Wife. Dog.     Enjoys running marathons.     ROS:  C: NEGATIVE for fever, chills, change in weight  I: NEGATIVE for worrisome rashes or moles. Nodule on right posterior neck base.  E: NEGATIVE for vision changes or irritation  ENT: NEGATIVE for ear, mouth and throat problems  R: NEGATIVE for  "significant cough or SOB  CV: NEGATIVE for chest pain, palpitations or peripheral edema  GI: NEGATIVE for nausea, abdominal pain, heartburn, or change in bowel habits   male: negative for dysuria, hematuria, decreased urinary stream, erectile dysfunction, urethral discharge  M: NEGATIVE for significant arthralgias or myalgia  N: NEGATIVE for weakness, dizziness or paresthesias  P: NEGATIVE for changes in mood or affect    Problem list, Medication list, Allergies, and Medical/Social/Surgical histories reviewed in [facility] and updated as appropriate.  OBJECTIVE:     /80 mmHg  Pulse 58  Temp(Src) 98.4  F (36.9  C) (Oral)  Ht 5' 11.5\" (1.816 m)  Wt 189 lb 8 oz (85.957 kg)  BMI 26.06 kg/m2  SpO2 96%  EXAM:  GENERAL: healthy, alert and no distress  EYES: Eyes grossly normal to inspection, PERRL and conjunctivae and sclerae normal  HENT: ear canals and TM's normal, nose and mouth without ulcers or lesions  NECK: no adenopathy, no asymmetry, or scars and thyroid normal to palpation. Superficial 2-3 cm nodule on left posterior neck   RESP: lungs clear to auscultation - no rales, rhonchi or wheezes  CV: regular rate and rhythm, normal S1 S2, no S3 or S4, no murmur, click or rub, no peripheral edema and peripheral pulses strong  ABDOMEN: soft, nontender, no hepatosplenomegaly, no masses and bowel sounds normal   (male): normal male genitalia without lesions or urethral discharge, no hernia  MS: no gross musculoskeletal defects noted, no edema  SKIN: no suspicious lesions or rashes  NEURO: Normal strength and tone, mentation intact and speech normal  PSYCH: mentation appears normal, affect normal/bright    ASSESSMENT/PLAN:     1. Routine general medical examination at a health care facility  - Patient's only concern is right neck nodule. Findings consistent with benign dermatofibroma with no specific follow up required.    2. Screening for diabetes mellitus  - Glucose    3. Screening for hyperlipidemia  - LIPID " "REFLEX TO DIRECT LDL PANEL    COUNSELING:   Reviewed preventive health counseling, as reflected in patient instructions       Regular exercise       Healthy diet/nutrition       Vision screening     reports that he has never smoked. He does not have any smokeless tobacco history on file.    Estimated body mass index is 24.59 kg/(m^2) as calculated from the following:    Height as of 1/30/13: 5' 11.75\" (1.822 m).    Weight as of 1/24/15: 180 lb (81.647 kg).        Counseling Resources:  ATP IV Guidelines  Pooled Cohorts Equation Calculator  FRAX Risk Assessment  ICSI Preventive Guidelines  Dietary Guidelines for Americans, 2010  USDA's MyPlate  ASA Prophylaxis  Lung CA Screening     Pancho Craig MD, MD  [facility]  Answers for HPI/ROS submitted by the patient on 1/27/2017   PHQ-2 Depressed: Not at all, Not at all  PHQ-2 Score: 0      "

## 2024-06-20 ENCOUNTER — HOSPITAL ENCOUNTER (OUTPATIENT)
Dept: PHYSICAL THERAPY | Age: 64
Setting detail: THERAPIES SERIES
Discharge: HOME | End: 2024-06-20
Attending: NEUROLOGICAL SURGERY
Payer: COMMERCIAL

## 2024-06-20 NOTE — PROGRESS NOTES
Therapy                            Cancellation/No-show Note      Date: 2024  Patient: Luis Becerra (63 y.o. male)  : 1960  MRN:  [id number]  Referring Physician: Cruz Judd MD    Medical Diagnosis: Cervical stenosis of spine [M48.02]      Visit Information:  Visits to Date 3   No Show/Cancelled Appts: 0 / 2      For today's appointment patient:  [x]  Cancelled  []  Rescheduled appointment  []  No-show   []  Called pt to remind of next appointment     Reason given by patient:  []  Patient ill  []  Conflicting appointment  []  No transportation    []  Conflict with work  []  No reason given  [x]  Other: \"Exposed to COVID\"     [x] Pt has future appointments scheduled, no follow up needed  [] Pt requests to be on hold.    Reason:   If > 2 weeks please discuss with therapist.  [] Therapist to call pt for follow up     Comments:       Signature: Electronically signed by Elizabeth Vila PT on 24 at 8:14 AM EDT

## 2024-06-24 ENCOUNTER — HOSPITAL ENCOUNTER (OUTPATIENT)
Dept: PHYSICAL THERAPY | Age: 64
Setting detail: THERAPIES SERIES
Discharge: HOME | End: 2024-06-24
Attending: NEUROLOGICAL SURGERY
Payer: COMMERCIAL

## 2024-06-24 PROCEDURE — 97110 THERAPEUTIC EXERCISES: CPT

## 2024-06-24 NOTE — PROGRESS NOTES
[address] [place] [address]  Phone: [phone]      Date: 2024  Patient: Luis Becerra  : 1960   Confirmed: Yes  MRN: [id number]  Referring Provider: Cruz Judd MD    Medical Diagnosis: Cervical stenosis of spine [M48.02]       Treatment Diagnosis: pain, decreased L sided cervical and L shoulder AROM, decreased motor function C6-C7, decreased cervical joint mobility, cervical muscle tightness, numbness and tingling.    Visit Information:  Insurance: Payor: [facility] / Plan: [employer] EM[employer]ES / Product Type: *No Product type* /   PT Visit Information  Onset Date: 24  Total # of Visits Approved: 30  Total # of Visits to Date: 4  No Show: 0  Canceled Appointment: 2  Progress Note Counter:     Subjective Information:  Subjective: Pt reports no pain over the last week any higher then 1/10. Currently no pain or complaints. Pt reports good complaince with HEP.  HEP Compliance:  [x] Good [] Fair [] Poor [] Reports not doing due to:    Pain Screening  Patient Currently in Pain: Denies    Treatment:  Exercises:  Exercises  Exercise 1: UBE L2.0 2'F/2'R  Exercise 4: Apollo Rows/Lat pulls 20# 2 x 10 ea.  Exercise 5: doorway pec stretch arms 60-90 deg 20''x3  Exercise 7: Apollo Tricep Ext 30#  2 x 10  Exercise 8: Apollo Upright Rows 20# 2 x 10  Exercise 9: T-band Horz Chest pulls IR/ER  RTB x 10 ea.  Exercise 20: HEP cont current       Manual:           Modalities:          *Indicates exercise, modality, or manual techniques to be initiated when appropriate    Objective Measures:                                                     LTG 2 Current Status:: 6/10/24 cervical flex 58 ext 44 L SB 40 R SB 35 L rot 62 R rot 65  Left Shoulder AROM  Flex: 127, , ER C6, IR PSIS     LTG 4 Current Status:: 24: Pt notes decreased pain since starting therapy. Reports 1/10 this date.  LTG 5 Current Status:: 24: Ongoing;          Assessment:   Body Structures,

## 2024-06-27 ENCOUNTER — HOSPITAL ENCOUNTER (OUTPATIENT)
Dept: PHYSICAL THERAPY | Age: 64
Setting detail: THERAPIES SERIES
Discharge: HOME | End: 2024-06-27
Attending: NEUROLOGICAL SURGERY
Payer: COMMERCIAL

## 2024-06-27 PROCEDURE — 97110 THERAPEUTIC EXERCISES: CPT

## 2024-06-27 NOTE — PROGRESS NOTES
[id number] [facility] [place] [address]  Phone: [phone]      Date: 2024  Patient: Luis Becerra  : 1960   Confirmed: Yes  MRN: [id number]  Referring Provider: Cruz Judd MD    Medical Diagnosis: Cervical stenosis of spine [M48.02]       Treatment Diagnosis: pain, decreased L sided cervical and L shoulder AROM, decreased motor function C6-C7, decreased cervical joint mobility, cervical muscle tightness, numbness and tingling.    Visit Information:  Insurance: Payor: [facility] / Plan: [employer] [facility] [employer] EM[employer]ES / Product Type: *No Product type* /   PT Visit Information  Onset Date: 24  Total # of Visits Approved: 30  Total # of Visits to Date: 5  No Show: 0  Canceled Appointment: 2  Progress Note Counter:     Subjective Information:  Subjective: Pt reports no pain today and is looking to be discharged.  Pt denies further treatment after goals are assessed.  HEP Compliance:  [x] Good [] Fair [] Poor [] Reports not doing due to:         Treatment:  Exercises:  Exercises  Exercise 1: Goals assessed     *Indicates exercise, modality, or manual techniques to be initiated when appropriate    Objective Measures:     STG 1 Current Status:: 4/5 L triceps, 4+/5 wrist flexor      LTG 1 Current Status:: Strength LUE  L Shoulder Flexion: 4+/5  L Shoulder Extension: 5/5  L Shoulder ABduction: 5/5  L Shoulder Internal Rotation: 5/5  L Shoulder External Rotation: 5/5  L Elbow Flexion: 5/5  L Elbow Extension: 4/5  L Wrist Flexion: 4+/5  L Wrist Extension: 5/5  LTG 2 Current Status:: 6/10/24 cervical flex 60 ext 50 L SB 47 R SB 45 L rot 65 R rot 68  Left Shoulder AROM  Flex: 138, , ER T3, IR L2  LTG 3 Current Status:: : 0/50 on NDI  LTG 4 Current Status:: 24: Pt notes decreased pain since starting therapy. Reports 0/10 this date.  LTG 5 Current Status:: 24: Pt is IND and compliant          Assessment:   Body Structures, Functions, Activity Limitations Requiring

## 2024-06-27 NOTE — THERAPY DISCHARGE
Current Status:: 6/27: 0/50 on NDI   Met   Long Term Goal 4: The pt will have decreased pain </= 0/10 in order to increase ease with ADL's LTG 4 Current Status:: 6/13/24: Pt notes decreased pain since starting therapy. Reports 0/10 this date.   Met   Long Term Goal 5: The pt will be independent/compliant with PT HEP in order to demonstrate self management of symptoms upon D/C LTG 5 Current Status:: 6/13/24: Pt is IND and compliant   Met     Body Structures, Functions, Activity Limitations Requiring Skilled Therapeutic Intervention: Decreased functional mobility , Decreased ROM, Decreased body mechanics, Decreased tolerance to work activity, Decreased strength, Decreased sensation, Increased pain, Decreased posture  Assessment: Pt is being seen today for possible discharge.  He demo overall improvement in functional mobility and pain relief.  He ha significant improvement in UE strength, UE ROM, NDI score, pain, and HEP compliance.  He is educated about continuing his independent HEP while at home to continue to improve.  Therapy Prognosis: Good           PLAN: [x] D/C from PT          Patient Status:[] Continue/ Initiate plan of Care     [x] Discharge PT.  Recommend pt continue with HEP.      [] Additional visits requested, Please re-certify for additional visits:     [] Hold        Signature: Electronically signed by Nash Carlin PT on 6/27/24 at 8:18 AM EDT      If you have any questions or concerns, please don't hesitate to call.  Thank you for your referral.

## 2024-08-20 RX ORDER — METHYLPREDNISOLONE 4 MG
TABLET, DOSE PACK ORAL
Qty: 1 KIT | Refills: 1 | Status: SHIPPED | OUTPATIENT
Start: 2024-08-20 | End: 2024-08-26

## 2024-09-12 ENCOUNTER — TELEPHONE (OUTPATIENT)
Age: 64
End: 2024-09-12

## 2024-09-23 ENCOUNTER — OFFICE VISIT (OUTPATIENT)
Dept: FAMILY MEDICINE CLINIC | Age: 64
End: 2024-09-23
Payer: COMMERCIAL

## 2024-09-23 VITALS
DIASTOLIC BLOOD PRESSURE: 70 MMHG | WEIGHT: 249 LBS | BODY MASS INDEX: 33.72 KG/M2 | SYSTOLIC BLOOD PRESSURE: 120 MMHG | HEIGHT: 72 IN | OXYGEN SATURATION: 98 % | RESPIRATION RATE: 14 BRPM | HEART RATE: 70 BPM

## 2024-09-23 DIAGNOSIS — Z12.5 PROSTATE CANCER SCREENING: ICD-10-CM

## 2024-09-23 DIAGNOSIS — G47.10 HYPERSOMNOLENCE: ICD-10-CM

## 2024-09-23 DIAGNOSIS — L60.2 OVERGROWN TOENAILS: ICD-10-CM

## 2024-09-23 DIAGNOSIS — E03.9 HYPOTHYROIDISM, UNSPECIFIED TYPE: ICD-10-CM

## 2024-09-23 DIAGNOSIS — E11.8 TYPE 2 DIABETES MELLITUS WITH COMPLICATION (HCC): Primary | ICD-10-CM

## 2024-09-23 DIAGNOSIS — M25.512 LEFT SHOULDER PAIN, UNSPECIFIED CHRONICITY: ICD-10-CM

## 2024-09-23 DIAGNOSIS — M76.62 LEFT ACHILLES TENDINITIS: ICD-10-CM

## 2024-09-23 DIAGNOSIS — R06.83 SNORING: ICD-10-CM

## 2024-09-23 PROCEDURE — 99214 OFFICE O/P EST MOD 30 MIN: CPT | Performed by: INTERNAL MEDICINE

## 2024-09-23 PROCEDURE — 3078F DIAST BP <80 MM HG: CPT | Performed by: INTERNAL MEDICINE

## 2024-09-23 PROCEDURE — 3051F HG A1C>EQUAL 7.0%<8.0%: CPT | Performed by: INTERNAL MEDICINE

## 2024-09-23 PROCEDURE — 96372 THER/PROPH/DIAG INJ SC/IM: CPT | Performed by: INTERNAL MEDICINE

## 2024-09-23 PROCEDURE — 3074F SYST BP LT 130 MM HG: CPT | Performed by: INTERNAL MEDICINE

## 2024-09-23 RX ORDER — INSULIN GLARGINE 100 [IU]/ML
INJECTION, SOLUTION SUBCUTANEOUS
Qty: 10 ADJUSTABLE DOSE PRE-FILLED PEN SYRINGE | Refills: 3 | Status: SHIPPED | OUTPATIENT
Start: 2024-09-23

## 2024-09-23 RX ORDER — MELOXICAM 15 MG/1
15 TABLET ORAL DAILY
Qty: 30 TABLET | Refills: 3 | Status: SHIPPED | OUTPATIENT
Start: 2024-09-23

## 2024-09-23 RX ORDER — TRIAMCINOLONE ACETONIDE 40 MG/ML
40 INJECTION, SUSPENSION INTRA-ARTICULAR; INTRAMUSCULAR ONCE
Status: COMPLETED | OUTPATIENT
Start: 2024-09-23 | End: 2024-09-23

## 2024-09-23 RX ADMIN — TRIAMCINOLONE ACETONIDE 40 MG: 40 INJECTION, SUSPENSION INTRA-ARTICULAR; INTRAMUSCULAR at 17:04

## 2024-09-24 ENCOUNTER — TELEPHONE (OUTPATIENT)
Dept: FAMILY MEDICINE CLINIC | Age: 64
End: 2024-09-24

## 2024-10-01 ENCOUNTER — TELEPHONE (OUTPATIENT)
Dept: ENDOCRINOLOGY | Age: 64
End: 2024-10-01

## 2024-10-04 ENCOUNTER — OFFICE VISIT (OUTPATIENT)
Dept: ORTHOPEDIC SURGERY | Age: 64
End: 2024-10-04

## 2024-10-04 ENCOUNTER — HOSPITAL ENCOUNTER (OUTPATIENT)
Dept: ORTHOPEDIC SURGERY | Age: 64
Discharge: HOME | End: 2024-10-06
Payer: COMMERCIAL

## 2024-10-04 VITALS — HEIGHT: 72 IN | OXYGEN SATURATION: 98 % | WEIGHT: 250 LBS | HEART RATE: 76 BPM | BODY MASS INDEX: 33.86 KG/M2

## 2024-10-04 DIAGNOSIS — M25.512 LEFT SHOULDER PAIN, UNSPECIFIED CHRONICITY: ICD-10-CM

## 2024-10-04 DIAGNOSIS — M25.512 LEFT SHOULDER PAIN, UNSPECIFIED CHRONICITY: Primary | ICD-10-CM

## 2024-10-04 PROCEDURE — 73030 X-RAY EXAM OF SHOULDER: CPT

## 2024-10-04 RX ORDER — LIDOCAINE HYDROCHLORIDE 10 MG/ML
5 INJECTION, SOLUTION INFILTRATION; PERINEURAL ONCE
Status: COMPLETED | OUTPATIENT
Start: 2024-10-04 | End: 2024-10-04

## 2024-10-04 RX ORDER — METHYLPREDNISOLONE ACETATE 80 MG/ML
80 INJECTION, SUSPENSION INTRA-ARTICULAR; INTRALESIONAL; INTRAMUSCULAR; SOFT TISSUE ONCE
Status: COMPLETED | OUTPATIENT
Start: 2024-10-04 | End: 2024-10-04

## 2024-10-04 RX ADMIN — LIDOCAINE HYDROCHLORIDE 5 ML: 10 INJECTION, SOLUTION INFILTRATION; PERINEURAL at 09:52

## 2024-10-04 RX ADMIN — METHYLPREDNISOLONE ACETATE 80 MG: 80 INJECTION, SUSPENSION INTRA-ARTICULAR; INTRALESIONAL; INTRAMUSCULAR; SOFT TISSUE at 09:53

## 2024-10-04 NOTE — PROGRESS NOTES
independently reviewed by me demonstrates no acute fracture or significant arthritic changes.    Laboratory Studies:   Lab Results   Component Value Date    WBC 6.0 08/18/2022    HGB 14.7 08/18/2022    HCT 44.7 08/18/2022    MCV 80.0 08/18/2022     08/18/2022     No results found for: \"SEDRATE\"  No results found for: \"CRP\"    Procedure Note - Left Shoulder Cortisone Injection  Time Out  [x] Patient Verified  [x] Site Verified  [x] Laterality Verified  [x] Procedure Verified    Before aspiration/injection, the risks and benefits of a corticosteroid injection including infection, local skin irritation, skin atrophy, continued pain/discomfort, elevated blood sugar, burning, failure to relieve pain, possible late infection were discussed with patient. Patient verbalized understanding and wanted to proceed with planned procedure.    After prepping and draping the left shoulder in the usual sterile fashion, 1cc of 80 mg/ml Depo-Medrol with 5cc of 1% lidocaine were injected into the subacromial space without any complications.  Patient tolerated this well.    Post-procedure discomfort can be alleviated with additional medications/ice/elevation/rest over the first 24 hours as recommended.     The patient tolerated the procedure well.      Assessment:      Diagnosis Orders   1. Left shoulder pain, unspecified chronicity  XR SHOULDER LEFT (MIN 2 VIEWS)    DRAIN/INJECT LARGE JOINT/BURSA    lidocaine 1 % injection 5 mL    methylPREDNISolone acetate (DEPO-MEDROL) injection 80 mg        Luis Becerra is a 63 y.o. male with a history and exam consistent with chronic left shoulder pain. This is an acute exacerbation of a chronic condition .     Plan:     He has had chronic pain in his left shoulder without injury.  He does also have cervical spine pathology for which he sees Dr. Judd.  Most of his pain is radiating along the shoulder into the triceps, and past the elbow.  I do think that most of this is coming from his

## 2024-10-18 ENCOUNTER — TELEPHONE (OUTPATIENT)
Dept: ENDOCRINOLOGY | Age: 64
End: 2024-10-18

## 2024-10-18 ENCOUNTER — TELEPHONE (OUTPATIENT)
Dept: FAMILY MEDICINE CLINIC | Age: 64
End: 2024-10-18

## 2024-10-18 DIAGNOSIS — E03.9 HYPOTHYROIDISM, UNSPECIFIED TYPE: ICD-10-CM

## 2024-10-18 DIAGNOSIS — E11.8 TYPE 2 DIABETES MELLITUS WITH COMPLICATION (HCC): ICD-10-CM

## 2024-10-18 DIAGNOSIS — Z12.5 PROSTATE CANCER SCREENING: ICD-10-CM

## 2024-10-18 DIAGNOSIS — E87.6 HYPOKALEMIA: Primary | ICD-10-CM

## 2024-10-18 LAB
ALBUMIN SERPL-MCNC: 4.1 G/DL (ref 3.5–4.6)
ALP SERPL-CCNC: 72 U/L (ref 35–104)
ALT SERPL-CCNC: 61 U/L (ref 0–41)
ANION GAP SERPL CALCULATED.3IONS-SCNC: 8 MEQ/L (ref 9–15)
AST SERPL-CCNC: 51 U/L (ref 0–40)
BASOPHILS # BLD: 0 K/UL (ref 0–0.2)
BASOPHILS NFR BLD: 0.3 %
BILIRUB SERPL-MCNC: 0.7 MG/DL (ref 0.2–0.7)
BUN SERPL-MCNC: 17 MG/DL (ref 8–23)
CALCIUM SERPL-MCNC: 9.2 MG/DL (ref 8.5–9.9)
CHLORIDE SERPL-SCNC: 100 MEQ/L (ref 95–107)
CHOLEST SERPL-MCNC: 139 MG/DL (ref 0–199)
CO2 SERPL-SCNC: 33 MEQ/L (ref 20–31)
CREAT SERPL-MCNC: 0.95 MG/DL (ref 0.7–1.2)
EOSINOPHIL # BLD: 0.1 K/UL (ref 0–0.7)
EOSINOPHIL NFR BLD: 1.3 %
ERYTHROCYTE [DISTWIDTH] IN BLOOD BY AUTOMATED COUNT: 14.6 % (ref 11.5–14.5)
GLOBULIN SER CALC-MCNC: 3.2 G/DL (ref 2.3–3.5)
GLUCOSE SERPL-MCNC: 102 MG/DL (ref 70–99)
HCT VFR BLD AUTO: 46.1 % (ref 42–52)
HDLC SERPL-MCNC: 38 MG/DL (ref 40–59)
HGB BLD-MCNC: 15.9 G/DL (ref 14–18)
LDL CHOLESTEROL: 85 MG/DL (ref 0–129)
LYMPHOCYTES # BLD: 2.4 K/UL (ref 1–4.8)
LYMPHOCYTES NFR BLD: 30.8 %
MCH RBC QN AUTO: 27.5 PG (ref 27–31.3)
MCHC RBC AUTO-ENTMCNC: 34.5 % (ref 33–37)
MCV RBC AUTO: 79.6 FL (ref 79–92.2)
MONOCYTES # BLD: 0.7 K/UL (ref 0.2–0.8)
MONOCYTES NFR BLD: 8.6 %
NEUTROPHILS # BLD: 4.6 K/UL (ref 1.4–6.5)
NEUTS SEG NFR BLD: 58.6 %
PLATELET # BLD AUTO: 317 K/UL (ref 130–400)
POTASSIUM SERPL-SCNC: 2.9 MEQ/L (ref 3.4–4.9)
PROT SERPL-MCNC: 7.3 G/DL (ref 6.3–8)
PSA SERPL-MCNC: 1.82 NG/ML (ref 0–4)
RBC # BLD AUTO: 5.79 M/UL (ref 4.7–6.1)
SODIUM SERPL-SCNC: 141 MEQ/L (ref 135–144)
THYROXINE (T4): 9.7 UG/DL (ref 4.5–11.7)
TRIGLYCERIDE, FASTING: 81 MG/DL (ref 0–150)
TSH SERPL-MCNC: 1.38 UIU/ML (ref 0.44–3.86)
WBC # BLD AUTO: 7.9 K/UL (ref 4.8–10.8)

## 2024-10-21 ENCOUNTER — OFFICE VISIT (OUTPATIENT)
Dept: ENDOCRINOLOGY | Age: 64
End: 2024-10-21
Payer: COMMERCIAL

## 2024-10-21 VITALS
HEART RATE: 65 BPM | HEIGHT: 72 IN | OXYGEN SATURATION: 97 % | BODY MASS INDEX: 33.32 KG/M2 | SYSTOLIC BLOOD PRESSURE: 149 MMHG | DIASTOLIC BLOOD PRESSURE: 86 MMHG | WEIGHT: 246 LBS

## 2024-10-21 DIAGNOSIS — E29.1 HYPOGONADISM MALE: ICD-10-CM

## 2024-10-21 DIAGNOSIS — E03.9 HYPOTHYROIDISM, UNSPECIFIED TYPE: ICD-10-CM

## 2024-10-21 DIAGNOSIS — E11.8 TYPE 2 DIABETES MELLITUS WITH COMPLICATION (HCC): Primary | ICD-10-CM

## 2024-10-21 LAB
CHP ED QC CHECK: NORMAL
GLUCOSE BLD-MCNC: 160 MG/DL

## 2024-10-21 PROCEDURE — 82962 GLUCOSE BLOOD TEST: CPT | Performed by: INTERNAL MEDICINE

## 2024-10-21 PROCEDURE — 3077F SYST BP >= 140 MM HG: CPT | Performed by: INTERNAL MEDICINE

## 2024-10-21 PROCEDURE — 3051F HG A1C>EQUAL 7.0%<8.0%: CPT | Performed by: INTERNAL MEDICINE

## 2024-10-21 PROCEDURE — 95251 CONT GLUC MNTR ANALYSIS I&R: CPT | Performed by: INTERNAL MEDICINE

## 2024-10-21 PROCEDURE — 99213 OFFICE O/P EST LOW 20 MIN: CPT | Performed by: INTERNAL MEDICINE

## 2024-10-21 PROCEDURE — 3078F DIAST BP <80 MM HG: CPT | Performed by: INTERNAL MEDICINE

## 2024-10-21 NOTE — PROGRESS NOTES
10/21/2024    Assessment:       Diagnosis Orders   1. Type 2 diabetes mellitus with complication (HCC)  POCT Glucose      2. Hypothyroidism, unspecified type        3. Hypogonadism male              PLAN:     Orders Placed This Encounter   Procedures    GLUCOSE MONITOR, 72 HOUR, PHYS INTERP    Basic Metabolic Panel     Standing Status:   Future     Standing Expiration Date:   10/21/2025    TSH with Reflex     Standing Status:   Future     Standing Expiration Date:   10/21/2025    T4, Free     Standing Status:   Future     Standing Expiration Date:   10/21/2025    Testosterone, free, total     Standing Status:   Future     Standing Expiration Date:   10/21/2025    CBC     Standing Status:   Future     Standing Expiration Date:   10/21/2025    Hemoglobin A1C     Standing Status:   Future     Standing Expiration Date:   10/21/2025    Microalbumin / Creatinine Urine Ratio     Standing Status:   Future     Standing Expiration Date:   10/21/2025    POCT Glucose       Continue patient on current dose of Lantus Ozempic continue current dose of thyroid replacement continue using Dexcom CGM follow-up in 6 months monitor labs    Orders Placed This Encounter   Procedures    POCT Glucose     No orders of the defined types were placed in this encounter.    No follow-ups on file.  Subjective:     Chief Complaint   Patient presents with    Diabetes    Follow-up    Hypothyroidism     Vitals:    10/21/24 0913 10/21/24 0914 10/21/24 0919   BP: (!) 152/79 (!) 154/90 (!) 149/86   Pulse: 65     SpO2: 97%     Weight: 111.6 kg (246 lb)     Height: 1.829 m (6')       Wt Readings from Last 3 Encounters:   10/21/24 111.6 kg (246 lb)   10/04/24 113.4 kg (250 lb)   09/23/24 112.9 kg (249 lb)     BP Readings from Last 3 Encounters:   10/21/24 (!) 149/86   09/23/24 120/70   06/10/24 136/78     Follow-up in therapy diabetes obesity history of hypogonadism hypothyroidism no recent labs to review patient potassium has been fluctuating history of

## 2024-10-21 NOTE — TELEPHONE ENCOUNTER
Spoke to patient at his appt today and PA is still pending . Faxed patient dexcom download from the appt

## 2024-10-23 DIAGNOSIS — E11.8 TYPE 2 DIABETES MELLITUS WITH COMPLICATION (HCC): ICD-10-CM

## 2024-10-23 DIAGNOSIS — E29.1 HYPOGONADISM MALE: ICD-10-CM

## 2024-10-23 DIAGNOSIS — E03.9 HYPOTHYROIDISM, UNSPECIFIED TYPE: ICD-10-CM

## 2024-10-23 LAB
ANION GAP SERPL CALCULATED.3IONS-SCNC: 11 MEQ/L (ref 9–15)
BUN SERPL-MCNC: 20 MG/DL (ref 8–23)
CALCIUM SERPL-MCNC: 9.6 MG/DL (ref 8.5–9.9)
CHLORIDE SERPL-SCNC: 103 MEQ/L (ref 95–107)
CO2 SERPL-SCNC: 28 MEQ/L (ref 20–31)
CREAT SERPL-MCNC: 0.98 MG/DL (ref 0.7–1.2)
CREAT UR-MCNC: 436.9 MG/DL
ERYTHROCYTE [DISTWIDTH] IN BLOOD BY AUTOMATED COUNT: 14.9 % (ref 11.5–14.5)
ESTIMATED AVERAGE GLUCOSE: 169 MG/DL
GLUCOSE SERPL-MCNC: 118 MG/DL (ref 70–99)
HBA1C MFR BLD: 7.5 % (ref 4–6)
HCT VFR BLD AUTO: 46 % (ref 42–52)
HGB BLD-MCNC: 15.5 G/DL (ref 14–18)
MCH RBC QN AUTO: 26.6 PG (ref 27–31.3)
MCHC RBC AUTO-ENTMCNC: 33.7 % (ref 33–37)
MCV RBC AUTO: 79 FL (ref 79–92.2)
MICROALBUMIN UR-MCNC: 6.3 MG/DL
MICROALBUMIN/CREAT UR-RTO: 14.4 MG/G (ref 0–30)
PLATELET # BLD AUTO: 362 K/UL (ref 130–400)
POTASSIUM SERPL-SCNC: 3.4 MEQ/L (ref 3.4–4.9)
RBC # BLD AUTO: 5.82 M/UL (ref 4.7–6.1)
SHBG SERPL-SCNC: 69 NMOL/L (ref 19–76)
SODIUM SERPL-SCNC: 142 MEQ/L (ref 135–144)
T4 FREE SERPL-MCNC: 1.19 NG/DL (ref 0.84–1.68)
TESTOST FREE SERPL-MCNC: 115.7 PG/ML (ref 47–244)
TESTOST SERPL-MCNC: 841 NG/DL (ref 193–740)
TSH REFLEX: 2.06 UIU/ML (ref 0.44–3.86)
WBC # BLD AUTO: 9.2 K/UL (ref 4.8–10.8)

## 2024-10-23 RX ORDER — ACYCLOVIR 400 MG/1
TABLET ORAL
Qty: 9 EACH | Refills: 3 | Status: SHIPPED | OUTPATIENT
Start: 2024-10-23

## 2024-11-20 ENCOUNTER — HOSPITAL ENCOUNTER (OUTPATIENT)
Dept: SLEEP CENTER | Age: 64
Discharge: HOME | End: 2024-11-22
Attending: INTERNAL MEDICINE
Payer: COMMERCIAL

## 2024-11-20 DIAGNOSIS — G47.10 HYPERSOMNOLENCE: ICD-10-CM

## 2024-11-20 PROCEDURE — 95806 SLEEP STUDY UNATT&RESP EFFT: CPT

## 2024-12-02 DIAGNOSIS — G47.33 OSA (OBSTRUCTIVE SLEEP APNEA): Primary | ICD-10-CM

## 2024-12-06 ENCOUNTER — INITIAL CONSULT (OUTPATIENT)
Dept: PODIATRY | Age: 64
End: 2024-12-06

## 2024-12-06 VITALS — BODY MASS INDEX: 33.86 KG/M2 | WEIGHT: 250 LBS | HEIGHT: 72 IN | TEMPERATURE: 97 F

## 2024-12-06 DIAGNOSIS — Z79.4 TYPE 2 DIABETES MELLITUS WITH HYPERGLYCEMIA, WITH LONG-TERM CURRENT USE OF INSULIN (HCC): ICD-10-CM

## 2024-12-06 DIAGNOSIS — M79.672 PAIN IN BOTH FEET: Primary | ICD-10-CM

## 2024-12-06 DIAGNOSIS — Q66.51 CONGENITAL PES PLANUS OF BOTH FEET: ICD-10-CM

## 2024-12-06 DIAGNOSIS — M77.31 CALCANEAL SPUR OF BOTH FEET: ICD-10-CM

## 2024-12-06 DIAGNOSIS — E11.65 TYPE 2 DIABETES MELLITUS WITH HYPERGLYCEMIA, WITH LONG-TERM CURRENT USE OF INSULIN (HCC): ICD-10-CM

## 2024-12-06 DIAGNOSIS — M77.32 CALCANEAL SPUR OF BOTH FEET: ICD-10-CM

## 2024-12-06 DIAGNOSIS — M79.671 PAIN IN BOTH FEET: Primary | ICD-10-CM

## 2024-12-06 DIAGNOSIS — L60.3 NAIL DYSTROPHY: ICD-10-CM

## 2024-12-06 DIAGNOSIS — Q66.52 CONGENITAL PES PLANUS OF BOTH FEET: ICD-10-CM

## 2024-12-06 DIAGNOSIS — L60.8 MELANONYCHIA: ICD-10-CM

## 2024-12-06 DIAGNOSIS — M20.42 HAMMERTOES OF BOTH FEET: ICD-10-CM

## 2024-12-06 DIAGNOSIS — M20.41 HAMMERTOES OF BOTH FEET: ICD-10-CM

## 2024-12-06 ASSESSMENT — ENCOUNTER SYMPTOMS
NAUSEA: 0
VOMITING: 0
SHORTNESS OF BREATH: 0
BACK PAIN: 1

## 2024-12-06 NOTE — PROGRESS NOTES
long and ingrown.      Left foot:      Protective Sensation: 10 sites tested.  10 sites sensed.      Skin integrity: Dry skin present. No ulcer.      Toenail Condition: Left toenails are abnormally thick, long and ingrown.      Comments: Planus foot type noted bilaterally.  MMT graded 5/5 for all 4 muscle groups bilaterally.  Flexion deformity noted to PIPJ 2-5 bilateral foot.  Decreasing the joint dorsiflexion noted bilaterally, soft endpoint noted.  Lymphadenopathy:      Comments: Popliteal lymph nodes are soft and nontender.   Skin:     General: Skin is warm and dry.      Capillary Refill: Capillary refill takes 2 to 3 seconds.      Comments: No open lesions noted bilaterally.  Normal skin turgor noted bilaterally.  Dry skin texture noted bilaterally.  Nail plates are extremely thick with dried keratin debris under the nail plates, there is no malodor or discoloration consistent with fungal infection noted.  Pigmentation of the nail plate consistent with melanonychia noted bilaterally.      Neurological:      General: No focal deficit present.      Mental Status: He is alert and oriented to person, place, and time.      Deep Tendon Reflexes: Babinski sign absent on the right side. Babinski sign absent on the left side.      Reflex Scores:       Patellar reflexes are 2+ on the right side and 2+ on the left side.       Achilles reflexes are 2+ on the right side and 2+ on the left side.     Comments: No ankle clonus noted bilaterally.  Vibratory sensation is intact.  Sharp versus dull discrimination is intact.  Heart versus cold discrimination is intact.   Psychiatric:         Mood and Affect: Mood normal.         Behavior: Behavior normal.         Assessment:      Diagnosis Orders   1. Pain in both feet   DIABETES FOOT EXAM    DEBRIDEMENT OF NAILS, 6 OR MORE      2. Type 2 diabetes mellitus with hyperglycemia, with long-term current use of insulin (HCC)   DIABETES FOOT EXAM    DEBRIDEMENT OF NAILS, 6 OR MORE

## 2024-12-22 PROBLEM — G47.10 HYPERSOMNOLENCE: Status: ACTIVE | Noted: 2024-12-22

## 2025-01-27 ENCOUNTER — OFFICE VISIT (OUTPATIENT)
Dept: PULMONOLOGY | Age: 65
End: 2025-01-27
Payer: COMMERCIAL

## 2025-01-27 VITALS
HEART RATE: 76 BPM | SYSTOLIC BLOOD PRESSURE: 122 MMHG | OXYGEN SATURATION: 98 % | BODY MASS INDEX: 33.59 KG/M2 | DIASTOLIC BLOOD PRESSURE: 78 MMHG | WEIGHT: 248 LBS | TEMPERATURE: 97.6 F | HEIGHT: 72 IN

## 2025-01-27 DIAGNOSIS — G47.33 OSA ON CPAP: Primary | ICD-10-CM

## 2025-01-27 DIAGNOSIS — E66.9 OBESITY, UNSPECIFIED CLASS, UNSPECIFIED OBESITY TYPE, UNSPECIFIED WHETHER SERIOUS COMORBIDITY PRESENT: ICD-10-CM

## 2025-01-27 PROCEDURE — 3078F DIAST BP <80 MM HG: CPT | Performed by: INTERNAL MEDICINE

## 2025-01-27 PROCEDURE — 3074F SYST BP LT 130 MM HG: CPT | Performed by: INTERNAL MEDICINE

## 2025-01-27 PROCEDURE — 99203 OFFICE O/P NEW LOW 30 MIN: CPT | Performed by: INTERNAL MEDICINE

## 2025-01-27 NOTE — PROGRESS NOTES
NEW PATIENT VISIT-PULMONARY/SLEEP    1/27/2025     REFERRING PHYSICIAN:  Jeff Frye MD     REASON FOR REFERRAL:  ROSANGELA    HPI:     Luis Becerra is a 64 y.o. male who was referred to sleep clinic for evaluation.     He underwent home sleep study that I reviewed personally and interpreted the results independently.  Sleep study showed moderate obstructive sleep apnea.  Overall AHI was 24.  There was mild sleep-related hypoxia.  He was given an AutoPap.  He has been doing well with it.  His compliance is decent.  He is averaging 5 hours and 20 minutes.  AHI is normal on the machine.  He feels refreshed he believes it made a huge difference.                Past Medical History   Past Medical History:   Diagnosis Date    Gynecomastia     History of hypokalemia     Hyperaldosteronism, unspecified (HCC)     Hypertension     Hypogonadism male     Hypokalemia     Hypothyroidism     Osteoarthritis     Type 2 diabetes mellitus without complication (HCC) N/a    Type II or unspecified type diabetes mellitus without mention of complication, not stated as uncontrolled        Past Surgical History  Past Surgical History:   Procedure Laterality Date    COLONOSCOPY N/A 12/17/2021    COLORECTAL CANCER SCREENING, HIGH RISK performed by Jerrod Lawton MD at [facility]    JOINT REPLACEMENT      left quadracep tendon rupture repair w/ anchors    MANDIBLE FRACTURE SURGERY      TONSILLECTOMY         Allergies  Allergies   Allergen Reactions    Morphine     Invokana [Canagliflozin]      UTI and yeast infection     Shellfish-Derived Products Swelling       Medications  Current Outpatient Medications   Medication Sig Dispense Refill    Continuous Glucose Sensor (DEXCOM G7 SENSOR) MISC Change every 10 days 9 each 3    meloxicam (MOBIC) 15 MG tablet Take 1 tablet by mouth daily 30 tablet 3    insulin glargine (LANTUS SOLOSTAR) 100 UNIT/ML injection pen 40 units in the evening daily 10 Adjustable Dose Pre-filled Pen

## 2025-02-12 DIAGNOSIS — I10 ESSENTIAL HYPERTENSION: ICD-10-CM

## 2025-02-12 RX ORDER — TAMOXIFEN CITRATE 10 MG/1
TABLET ORAL
Qty: 180 TABLET | Refills: 3 | Status: SHIPPED | OUTPATIENT
Start: 2025-02-12

## 2025-03-03 ENCOUNTER — OFFICE VISIT (OUTPATIENT)
Age: 65
End: 2025-03-03

## 2025-03-03 VITALS — BODY MASS INDEX: 33.18 KG/M2 | WEIGHT: 245 LBS | HEIGHT: 72 IN | TEMPERATURE: 97.3 F

## 2025-03-03 DIAGNOSIS — E11.65 TYPE 2 DIABETES MELLITUS WITH HYPERGLYCEMIA, WITH LONG-TERM CURRENT USE OF INSULIN (HCC): Primary | ICD-10-CM

## 2025-03-03 DIAGNOSIS — M79.672 PAIN IN BOTH FEET: ICD-10-CM

## 2025-03-03 DIAGNOSIS — M79.671 PAIN IN BOTH FEET: ICD-10-CM

## 2025-03-03 DIAGNOSIS — Z79.4 TYPE 2 DIABETES MELLITUS WITH HYPERGLYCEMIA, WITH LONG-TERM CURRENT USE OF INSULIN (HCC): Primary | ICD-10-CM

## 2025-03-03 DIAGNOSIS — L60.3 NAIL DYSTROPHY: ICD-10-CM

## 2025-03-03 ASSESSMENT — ENCOUNTER SYMPTOMS
NAUSEA: 0
VOMITING: 0
SHORTNESS OF BREATH: 0
BACK PAIN: 1

## 2025-03-03 NOTE — PROGRESS NOTES
[facility] [place] [facility], [facility] PODIATRY  [address] [place], [place] [id number]  Dept: [phone]  Loc: [phone]       Luis Becerra  (1960)    3/3/25    Subjective     Luis Becerra is 64 y.o. male who complains today of:    Chief Complaint   Patient presents with    Diabetes    Nail Problem     Both feet       Diabetes  Pertinent negatives for diabetes include no chest pain.     HPI: The patient presents for evaluation and treatment of painful digital nail deformities.  The patient has been unable to provide self nail care due to the severe nature of deformity which is causing pressure and limiting their ability to walk in shoe gear without pain.  Self debridement has been attempted with no success. This is a chronic problem.     Review of Systems   Constitutional:  Negative for chills and fever.   HENT:  Negative for hearing loss.    Respiratory:  Negative for shortness of breath.    Cardiovascular:  Negative for chest pain.   Gastrointestinal:  Negative for nausea and vomiting.   Genitourinary:  Negative for difficulty urinating.   Musculoskeletal:  Positive for back pain. Negative for gait problem.   Skin:  Negative for wound.   Neurological:  Negative for numbness.   Hematological:  Does not bruise/bleed easily.   Psychiatric/Behavioral:  Negative for sleep disturbance.        The patient is a diabetic.   PCP/ Endocrinologist: Suresh Murillo MD   Date last seen: October 21, 2024    Allergies:  Morphine, Invokana [canagliflozin], and Shellfish-derived products    Current Outpatient Medications on File Prior to Visit   Medication Sig Dispense Refill    tamoxifen (NOLVADEX) 10 MG tablet TAKE 1 TABLET BY MOUTH 2 TIMES A  tablet 3    Continuous Glucose Sensor (DEXCOM G7 SENSOR) MISC Change every 10 days 9 each 3    meloxicam (MOBIC) 15 MG tablet Take 1 tablet by mouth daily 30 tablet 3    insulin glargine (LANTUS SOLOSTAR) 100 UNIT/ML

## 2025-03-18 DIAGNOSIS — I10 ESSENTIAL HYPERTENSION: ICD-10-CM

## 2025-03-18 DIAGNOSIS — E11.8 TYPE 2 DIABETES MELLITUS WITH COMPLICATION (HCC): ICD-10-CM

## 2025-03-18 RX ORDER — EPLERENONE 50 MG/1
50 TABLET ORAL DAILY
Qty: 30 TABLET | Refills: 0 | Status: SHIPPED | OUTPATIENT
Start: 2025-03-18

## 2025-03-18 RX ORDER — EPLERENONE 50 MG/1
TABLET ORAL
Qty: 180 TABLET | Refills: 2 | Status: SHIPPED | OUTPATIENT
Start: 2025-03-18

## 2025-03-18 NOTE — TELEPHONE ENCOUNTER
Requested Prescriptions     Pending Prescriptions Disp Refills    eplerenone (INSPRA) 50 MG tablet 180 tablet 2     Sig: TAKE ONE TABLET BY MOUTH TWICE A DAY    eplerenone (INSPRA) 50 MG tablet 30 tablet 0     Sig: Take 1 tablet by mouth daily

## 2025-03-21 DIAGNOSIS — I10 ESSENTIAL HYPERTENSION: ICD-10-CM

## 2025-03-21 RX ORDER — POTASSIUM CHLORIDE 750 MG/1
30 TABLET, EXTENDED RELEASE ORAL DAILY
Qty: 270 TABLET | Refills: 3 | Status: SHIPPED | OUTPATIENT
Start: 2025-03-21

## 2025-04-20 DIAGNOSIS — E11.8 TYPE 2 DIABETES MELLITUS WITH COMPLICATION (HCC): ICD-10-CM

## 2025-04-20 DIAGNOSIS — I10 ESSENTIAL HYPERTENSION: ICD-10-CM

## 2025-04-21 RX ORDER — AMLODIPINE BESYLATE 10 MG/1
TABLET ORAL
Qty: 90 TABLET | Refills: 0 | Status: SHIPPED | OUTPATIENT
Start: 2025-04-21 | End: 2025-04-25 | Stop reason: SDUPTHER

## 2025-04-21 RX ORDER — ATENOLOL AND CHLORTHALIDONE TABLET 100; 25 MG/1; MG/1
1 TABLET ORAL DAILY
Qty: 90 TABLET | Refills: 0 | Status: SHIPPED | OUTPATIENT
Start: 2025-04-21 | End: 2025-04-25 | Stop reason: SDUPTHER

## 2025-04-22 ENCOUNTER — TELEPHONE (OUTPATIENT)
Age: 65
End: 2025-04-22

## 2025-04-22 DIAGNOSIS — E11.8 TYPE 2 DIABETES MELLITUS WITH COMPLICATION (HCC): ICD-10-CM

## 2025-04-22 RX ORDER — SEMAGLUTIDE 1.34 MG/ML
INJECTION, SOLUTION SUBCUTANEOUS
Qty: 4 ADJUSTABLE DOSE PRE-FILLED PEN SYRINGE | Refills: 3 | Status: SHIPPED | OUTPATIENT
Start: 2025-04-22 | End: 2025-04-25 | Stop reason: SDUPTHER

## 2025-04-22 NOTE — TELEPHONE ENCOUNTER
Prior Authorization History  Semaglutide, 1 MG/DOSE, (OZEMPIC, 1 MG/DOSE,) 4 MG/3ML SOPN sc injection     History  PA Detail   View all authorizations for this medication  Closed   4/22/2025 11:08 AM  Close reason: Prior Authorization not required for patient/medication   Note from payer: [facility] has predicted that this prior auth will not be required.   Payer: [employer] Patient's Payer Case ID: [id number]    [phone]  Waiting for Payer Response   4/22/2025 11:08 AM  Sending user: Jeff Frye MD   Payer: [employer] Patient's Payer    [phone]

## 2025-04-25 ENCOUNTER — OFFICE VISIT (OUTPATIENT)
Age: 65
End: 2025-04-25

## 2025-04-25 VITALS
DIASTOLIC BLOOD PRESSURE: 72 MMHG | HEIGHT: 72 IN | SYSTOLIC BLOOD PRESSURE: 127 MMHG | BODY MASS INDEX: 34.13 KG/M2 | HEART RATE: 59 BPM | WEIGHT: 252 LBS

## 2025-04-25 DIAGNOSIS — E11.8 TYPE 2 DIABETES MELLITUS WITH COMPLICATION (HCC): Primary | ICD-10-CM

## 2025-04-25 DIAGNOSIS — N62 GYNECOMASTIA: ICD-10-CM

## 2025-04-25 DIAGNOSIS — I10 ESSENTIAL HYPERTENSION: ICD-10-CM

## 2025-04-25 DIAGNOSIS — E03.9 HYPOTHYROIDISM, UNSPECIFIED TYPE: ICD-10-CM

## 2025-04-25 LAB
CHP ED QC CHECK: ABNORMAL
GLUCOSE BLD-MCNC: 265 MG/DL
HBA1C MFR BLD: 7 %

## 2025-04-25 RX ORDER — LEVOTHYROXINE SODIUM 125 UG/1
TABLET ORAL
Qty: 90 TABLET | Refills: 3 | Status: SHIPPED | OUTPATIENT
Start: 2025-04-25

## 2025-04-25 RX ORDER — ACYCLOVIR 400 MG/1
TABLET ORAL
Qty: 9 EACH | Refills: 3 | Status: SHIPPED | OUTPATIENT
Start: 2025-04-25

## 2025-04-25 RX ORDER — SEMAGLUTIDE 1.34 MG/ML
INJECTION, SOLUTION SUBCUTANEOUS
Qty: 4 ADJUSTABLE DOSE PRE-FILLED PEN SYRINGE | Refills: 3 | Status: SHIPPED | OUTPATIENT
Start: 2025-04-25

## 2025-04-25 RX ORDER — INSULIN GLARGINE 100 [IU]/ML
INJECTION, SOLUTION SUBCUTANEOUS
Qty: 10 ADJUSTABLE DOSE PRE-FILLED PEN SYRINGE | Refills: 3 | Status: SHIPPED | OUTPATIENT
Start: 2025-04-25

## 2025-04-25 RX ORDER — ATENOLOL AND CHLORTHALIDONE TABLET 100; 25 MG/1; MG/1
1 TABLET ORAL DAILY
Qty: 90 TABLET | Refills: 0 | Status: SHIPPED | OUTPATIENT
Start: 2025-04-25

## 2025-04-25 RX ORDER — AMLODIPINE BESYLATE 10 MG/1
TABLET ORAL
Qty: 90 TABLET | Refills: 0 | Status: SHIPPED | OUTPATIENT
Start: 2025-04-25

## 2025-04-25 NOTE — PROGRESS NOTES
2025    Assessment:       Diagnosis Orders   1. Type 2 diabetes mellitus with complication (HCC)  POCT Glucose    POCT glycosylated hemoglobin (Hb A1C)    Basic Metabolic Panel    Hemoglobin A1C    insulin glargine (LANTUS SOLOSTAR) 100 UNIT/ML injection pen    atenolol-chlorthalidone (TENORETIC) 100-25 MG per tablet    Continuous Glucose Sensor (DEXCOM G7 SENSOR) MISC    Semaglutide, 1 MG/DOSE, (OZEMPIC, 1 MG/DOSE,) 4 MG/3ML SOPN sc injection    GLUCOSE MONITOR, 72 HOUR, PHYS INTERP      2. Hypothyroidism, unspecified type  T4, Free    TSH reflex to FT4    levothyroxine (SYNTHROID) 125 MCG tablet      3. Essential hypertension  atenolol-chlorthalidone (TENORETIC) 100-25 MG per tablet      4. Gynecomastia              PLAN:     Orders Placed This Encounter   Procedures    GLUCOSE MONITOR, 72 HOUR, PHYS INTERP    Basic Metabolic Panel     Standing Status:   Future     Expected Date:   2025     Expiration Date:   2026    Hemoglobin A1C     Standing Status:   Future     Expected Date:   2025     Expiration Date:   2026    CBC     Standing Status:   Future     Expected Date:   2025     Expiration Date:   2026    Testosterone, free, total     Standing Status:   Future     Expected Date:   2025     Expiration Date:   2026    T4, Free     Standing Status:   Future     Expected Date:   2025     Expiration Date:   2026    TSH reflex to FT4     Standing Status:   Future     Expected Date:   2025     Expiration Date:   2026    POCT Glucose    POCT glycosylated hemoglobin (Hb A1C)       Orders Placed This Encounter   Medications    insulin glargine (LANTUS SOLOSTAR) 100 UNIT/ML injection pen     Si units in the evening daily     Dispense:  10 Adjustable Dose Pre-filled Pen Syringe     Refill:  3    levothyroxine (SYNTHROID) 125 MCG tablet     Sig: TAKE 1 TABLET BY MOUTH ONE TIME A DAY     Dispense:  90 tablet     Refill:  3    amLODIPine (NORVASC) 10 MG

## 2025-06-04 DIAGNOSIS — I10 ESSENTIAL HYPERTENSION: ICD-10-CM

## 2025-06-04 RX ORDER — TELMISARTAN 80 MG/1
80 TABLET ORAL DAILY
Qty: 90 TABLET | Refills: 0 | Status: SHIPPED | OUTPATIENT
Start: 2025-06-04

## 2025-06-06 ENCOUNTER — OFFICE VISIT (OUTPATIENT)
Age: 65
End: 2025-06-06

## 2025-06-06 VITALS — HEIGHT: 72 IN | BODY MASS INDEX: 33.86 KG/M2 | WEIGHT: 250 LBS | TEMPERATURE: 96.9 F

## 2025-06-06 DIAGNOSIS — M79.672 PAIN IN BOTH FEET: ICD-10-CM

## 2025-06-06 DIAGNOSIS — M79.671 PAIN IN BOTH FEET: ICD-10-CM

## 2025-06-06 DIAGNOSIS — L60.3 NAIL DYSTROPHY: ICD-10-CM

## 2025-06-06 DIAGNOSIS — E11.65 TYPE 2 DIABETES MELLITUS WITH HYPERGLYCEMIA, WITH LONG-TERM CURRENT USE OF INSULIN (HCC): Primary | ICD-10-CM

## 2025-06-06 DIAGNOSIS — Z79.4 TYPE 2 DIABETES MELLITUS WITH HYPERGLYCEMIA, WITH LONG-TERM CURRENT USE OF INSULIN (HCC): Primary | ICD-10-CM

## 2025-06-06 ASSESSMENT — ENCOUNTER SYMPTOMS
BACK PAIN: 1
NAUSEA: 0
VOMITING: 0
SHORTNESS OF BREATH: 0

## 2025-06-06 NOTE — PROGRESS NOTES
atenolol-chlorthalidone (TENORETIC) 100-25 MG per tablet Take 1 tablet by mouth daily 90 tablet 0    Continuous Glucose Sensor (DEXCOM G7 SENSOR) MISC Change every 10 days 9 each 3    Semaglutide, 1 MG/DOSE, (OZEMPIC, 1 MG/DOSE,) 4 MG/3ML SOPN sc injection INJECT 1MG UNDER THE SKIN ONCE WEEKLY 4 Adjustable Dose Pre-filled Pen Syringe 3    potassium chloride (KLOR-CON) 10 MEQ extended release tablet Take 3 tablets by mouth daily 270 tablet 3    eplerenone (INSPRA) 50 MG tablet TAKE ONE TABLET BY MOUTH TWICE A  tablet 2    eplerenone (INSPRA) 50 MG tablet Take 1 tablet by mouth daily 30 tablet 0    tamoxifen (NOLVADEX) 10 MG tablet TAKE 1 TABLET BY MOUTH 2 TIMES A  tablet 3    meloxicam (MOBIC) 15 MG tablet Take 1 tablet by mouth daily 30 tablet 3    eplerenone (INSPRA) 50 MG tablet TAKE ONE TABLET BY MOUTH TWICE A  tablet 2    Prodigy Twist Top Lancets 28G MISC TEST ONCE DAILY 100 each 3    blood glucose test strips (PRODIGY NO CODING BLOOD GLUC) strip TEST ONCE DAILY 100 strip 3    Continuous Blood Gluc  (DEXCOM G7 ) CATHRYN Give 1  1 each 0    Insulin Pen Needle (NOVOFINE PLUS PEN NEEDLE) 32G X 4 MM MISC 1 each by Does not apply route daily 100 each 3    mometasone (ELOCON) 0.1 % cream Apply topically twice daily 50 g 0    Blood Glucose Monitoring Suppl (PRODIGY AUTOCODE BLOOD GLUCOSE) CATHRYN Give 1 meter 1 each 0    Lancet Devices (PRODIGY LANCING DEVICE) MISC Give 1 device 1 each 0    Prodigy Lancets 28G MISC Test once daily 100 each 3    Multiple Vitamin (MULTIVITAMIN ADULT PO) Take by mouth daily      ketorolac (TORADOL) 10 MG tablet Take 1 tablet by mouth every 6 hours as needed for Pain 40 tablet 0    colchicine (COLCRYS) 0.6 MG tablet Take 1 tablet by mouth 2 times daily for 1 day 3 tablet 0     No current facility-administered medications on file prior to visit.       Past Medical History:   Diagnosis Date    Gynecomastia     History of hypokalemia

## 2025-06-25 ENCOUNTER — NURSE TRIAGE (OUTPATIENT)
Dept: OTHER | Facility: CLINIC | Age: 65
End: 2025-06-25
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
## 2025-07-31 ENCOUNTER — OFFICE VISIT (OUTPATIENT)
Age: 65
End: 2025-07-31
Payer: COMMERCIAL

## 2025-07-31 VITALS
SYSTOLIC BLOOD PRESSURE: 123 MMHG | BODY MASS INDEX: 34.27 KG/M2 | HEIGHT: 72 IN | WEIGHT: 253 LBS | OXYGEN SATURATION: 98 % | HEART RATE: 70 BPM | DIASTOLIC BLOOD PRESSURE: 78 MMHG

## 2025-07-31 DIAGNOSIS — E03.9 HYPOTHYROIDISM, UNSPECIFIED TYPE: ICD-10-CM

## 2025-07-31 DIAGNOSIS — N62 GYNECOMASTIA: ICD-10-CM

## 2025-07-31 DIAGNOSIS — E11.8 TYPE 2 DIABETES MELLITUS WITH COMPLICATION (HCC): Primary | ICD-10-CM

## 2025-07-31 DIAGNOSIS — I10 ESSENTIAL HYPERTENSION: ICD-10-CM

## 2025-07-31 DIAGNOSIS — E11.8 TYPE 2 DIABETES MELLITUS WITH COMPLICATION (HCC): ICD-10-CM

## 2025-07-31 LAB
ANION GAP SERPL CALCULATED.3IONS-SCNC: 11 MEQ/L (ref 9–15)
BUN SERPL-MCNC: 15 MG/DL (ref 8–23)
CALCIUM SERPL-MCNC: 9.3 MG/DL (ref 8.5–9.9)
CHLORIDE SERPL-SCNC: 104 MEQ/L (ref 95–107)
CHP ED QC CHECK: NORMAL
CO2 SERPL-SCNC: 28 MEQ/L (ref 20–31)
CREAT SERPL-MCNC: 1.15 MG/DL (ref 0.7–1.2)
ERYTHROCYTE [DISTWIDTH] IN BLOOD BY AUTOMATED COUNT: 14 % (ref 11.5–14.5)
GLUCOSE BLD-MCNC: 182 MG/DL
GLUCOSE SERPL-MCNC: 131 MG/DL (ref 70–99)
HBA1C MFR BLD: 6.9 %
HCT VFR BLD AUTO: 43.7 % (ref 42–52)
HGB BLD-MCNC: 15.3 G/DL (ref 14–18)
MCH RBC QN AUTO: 27.6 PG (ref 27–31.3)
MCHC RBC AUTO-ENTMCNC: 35 % (ref 33–37)
MCV RBC AUTO: 78.7 FL (ref 79–92.2)
PLATELET # BLD AUTO: 354 K/UL (ref 130–400)
POTASSIUM SERPL-SCNC: 3.2 MEQ/L (ref 3.4–4.9)
RBC # BLD AUTO: 5.55 M/UL (ref 4.7–6.1)
SHBG SERPL-SCNC: 108 NMOL/L (ref 19–76)
SODIUM SERPL-SCNC: 143 MEQ/L (ref 135–144)
T4 FREE SERPL-MCNC: 1.34 NG/DL (ref 0.84–1.68)
TESTOST FREE SERPL-MCNC: 68 PG/ML (ref 47–244)
TESTOST SERPL-MCNC: 754 NG/DL (ref 193–740)
TSH REFLEX: 1.65 UIU/ML (ref 0.44–3.86)
WBC # BLD AUTO: 6.9 K/UL (ref 4.8–10.8)

## 2025-07-31 PROCEDURE — 99214 OFFICE O/P EST MOD 30 MIN: CPT | Performed by: INTERNAL MEDICINE

## 2025-07-31 PROCEDURE — 83036 HEMOGLOBIN GLYCOSYLATED A1C: CPT | Performed by: INTERNAL MEDICINE

## 2025-07-31 PROCEDURE — 82962 GLUCOSE BLOOD TEST: CPT | Performed by: INTERNAL MEDICINE

## 2025-07-31 PROCEDURE — 95251 CONT GLUC MNTR ANALYSIS I&R: CPT | Performed by: INTERNAL MEDICINE

## 2025-07-31 PROCEDURE — 3078F DIAST BP <80 MM HG: CPT | Performed by: INTERNAL MEDICINE

## 2025-07-31 PROCEDURE — 3044F HG A1C LEVEL LT 7.0%: CPT | Performed by: INTERNAL MEDICINE

## 2025-07-31 PROCEDURE — 3074F SYST BP LT 130 MM HG: CPT | Performed by: INTERNAL MEDICINE

## 2025-07-31 RX ORDER — AMLODIPINE BESYLATE 10 MG/1
TABLET ORAL
Qty: 90 TABLET | Refills: 3 | Status: SHIPPED | OUTPATIENT
Start: 2025-07-31

## 2025-07-31 RX ORDER — ATENOLOL AND CHLORTHALIDONE TABLET 100; 25 MG/1; MG/1
1 TABLET ORAL DAILY
Qty: 90 TABLET | Refills: 0 | Status: SHIPPED | OUTPATIENT
Start: 2025-07-31

## 2025-07-31 RX ORDER — EPLERENONE 50 MG/1
50 TABLET ORAL DAILY
Qty: 90 TABLET | Refills: 3 | Status: SHIPPED | OUTPATIENT
Start: 2025-07-31

## 2025-07-31 RX ORDER — TAMOXIFEN CITRATE 10 MG/1
TABLET ORAL
Qty: 180 TABLET | Refills: 3 | Status: SHIPPED | OUTPATIENT
Start: 2025-07-31

## 2025-07-31 NOTE — PROGRESS NOTES
7/31/2025    Assessment:       Diagnosis Orders   1. Type 2 diabetes mellitus with complication (HCC)  POCT glycosylated hemoglobin (Hb A1C)    POCT Glucose    Basic Metabolic Panel    Hemoglobin A1C    amLODIPine (NORVASC) 10 MG tablet    atenolol-chlorthalidone (TENORETIC) 100-25 MG per tablet    GLUCOSE MONITOR, 72 HOUR, PHYS INTERP      2. Hypothyroidism, unspecified type  T4, Free    TSH reflex to FT4    GLUCOSE MONITOR, 72 HOUR, PHYS INTERP      3. Essential hypertension  atenolol-chlorthalidone (TENORETIC) 100-25 MG per tablet    tamoxifen (NOLVADEX) 10 MG tablet    GLUCOSE MONITOR, 72 HOUR, PHYS INTERP      4. Gynecomastia  CBC            PLAN:     Orders Placed This Encounter   Procedures    GLUCOSE MONITOR, 72 HOUR, PHYS INTERP    Basic Metabolic Panel     Standing Status:   Future     Expected Date:   7/31/2025     Expiration Date:   7/31/2026    CBC     Standing Status:   Future     Expected Date:   7/31/2025     Expiration Date:   7/31/2026    Hemoglobin A1C     Standing Status:   Future     Expected Date:   7/31/2025     Expiration Date:   7/31/2026    T4, Free     Standing Status:   Future     Expected Date:   7/31/2025     Expiration Date:   7/31/2026    TSH reflex to FT4     Standing Status:   Future     Expected Date:   7/31/2025     Expiration Date:   7/31/2026    POCT glycosylated hemoglobin (Hb A1C)    POCT Glucose     Orders Placed This Encounter   Medications    amLODIPine (NORVASC) 10 MG tablet     Sig: Take 1 tab daily     Dispense:  90 tablet     Refill:  3    atenolol-chlorthalidone (TENORETIC) 100-25 MG per tablet     Sig: Take 1 tablet by mouth daily     Dispense:  90 tablet     Refill:  0    eplerenone (INSPRA) 50 MG tablet     Sig: Take 1 tablet by mouth daily     Dispense:  90 tablet     Refill:  3    tamoxifen (NOLVADEX) 10 MG tablet     Sig: TAKE 1 TABLET BY MOUTH 2 TIMES A DAY     Dispense:  180 tablet     Refill:  3     Continue current medication regimen for hypertension thyroid

## 2025-08-11 DIAGNOSIS — I10 ESSENTIAL HYPERTENSION: ICD-10-CM

## 2025-08-11 RX ORDER — TELMISARTAN 80 MG/1
80 TABLET ORAL DAILY
Qty: 90 TABLET | Refills: 0 | Status: SHIPPED | OUTPATIENT
Start: 2025-08-11

## (undated) DEVICE — TUBE SET 96 MM 64 MM H2O PERISTALTIC STD AUX CHANNEL

## (undated) DEVICE — SINGLE PORT MANIFOLD: Brand: NEPTUNE 2

## (undated) DEVICE — FORCEPS BX L240CM JAW DIA2.4MM ORNG L CAP W/ NDL DISP RAD

## (undated) DEVICE — TUBING, SUCTION, 1/4" X 10', STRAIGHT: Brand: MEDLINE

## (undated) DEVICE — ENDO CARRY-ON PROCEDURE KIT: Brand: ENDO CARRY-ON PROCEDURE KIT

## (undated) DEVICE — BRUSH ENDO CLN L90.5IN SHTH DIA1.7MM BRIST DIA5-7MM 2-6MM

## (undated) DEVICE — Device: Brand: ENDO SMARTCAP